# Patient Record
Sex: MALE | Race: BLACK OR AFRICAN AMERICAN | Employment: OTHER
[De-identification: names, ages, dates, MRNs, and addresses within clinical notes are randomized per-mention and may not be internally consistent; named-entity substitution may affect disease eponyms.]

---

## 2017-03-08 ENCOUNTER — HOSPITAL ENCOUNTER (OUTPATIENT)
Dept: RADIATION ONCOLOGY | Facility: MEDICAL CENTER | Age: 72
Discharge: HOME | End: 2017-03-08
Payer: MEDICARE

## 2017-07-11 ENCOUNTER — HOSPITAL ENCOUNTER (OUTPATIENT)
Dept: VASCULAR LAB | Age: 72
Discharge: HOME | End: 2017-07-11
Payer: MEDICARE

## 2017-07-11 PROCEDURE — 93880 EXTRACRANIAL BILAT STUDY: CPT

## 2017-07-28 ENCOUNTER — HOSPITAL ENCOUNTER (OUTPATIENT)
Dept: ULTRASOUND IMAGING | Age: 72
Discharge: HOME | End: 2017-07-28
Payer: MEDICARE

## 2017-07-28 DIAGNOSIS — N18.9 ACUTE-ON-CHRONIC KIDNEY INJURY (HCC): ICD-10-CM

## 2017-07-28 DIAGNOSIS — N17.9 ACUTE-ON-CHRONIC KIDNEY INJURY (HCC): ICD-10-CM

## 2017-07-28 PROCEDURE — 76775 US EXAM ABDO BACK WALL LIM: CPT

## 2018-02-07 ENCOUNTER — ANESTHESIA EVENT (OUTPATIENT)
Dept: OPERATING ROOM | Age: 73
End: 2018-02-07
Payer: MEDICARE

## 2018-02-08 ENCOUNTER — HOSPITAL ENCOUNTER (OUTPATIENT)
Age: 73
Setting detail: OUTPATIENT SURGERY
Discharge: HOME | End: 2018-02-08
Attending: SURGERY | Admitting: SURGERY
Payer: MEDICARE

## 2018-02-08 ENCOUNTER — ANESTHESIA (OUTPATIENT)
Dept: OPERATING ROOM | Age: 73
End: 2018-02-08
Payer: MEDICARE

## 2018-02-08 VITALS
TEMPERATURE: 96.8 F | HEIGHT: 71 IN | DIASTOLIC BLOOD PRESSURE: 66 MMHG | RESPIRATION RATE: 15 BRPM | HEART RATE: 71 BPM | BODY MASS INDEX: 27.9 KG/M2 | WEIGHT: 199.3 LBS | SYSTOLIC BLOOD PRESSURE: 110 MMHG | OXYGEN SATURATION: 96 %

## 2018-02-08 VITALS — TEMPERATURE: 96.8 F | OXYGEN SATURATION: 99 % | DIASTOLIC BLOOD PRESSURE: 51 MMHG | SYSTOLIC BLOOD PRESSURE: 80 MMHG

## 2018-02-08 LAB
INR BLD: 1.7
PARTIAL THROMBOPLASTIN TIME: 30.3 SEC (ref 23–31)
PROTHROMBIN TIME: 17.6 SEC (ref 9.7–11.6)

## 2018-02-08 PROCEDURE — 2500000003 HC RX 250 WO HCPCS: Performed by: NURSE ANESTHETIST, CERTIFIED REGISTERED

## 2018-02-08 PROCEDURE — 2580000003 HC RX 258: Performed by: ANESTHESIOLOGY

## 2018-02-08 PROCEDURE — 3700000001 HC ADD 15 MINUTES (ANESTHESIA): Performed by: SURGERY

## 2018-02-08 PROCEDURE — 88304 TISSUE EXAM BY PATHOLOGIST: CPT

## 2018-02-08 PROCEDURE — 85730 THROMBOPLASTIN TIME PARTIAL: CPT

## 2018-02-08 PROCEDURE — 3609010400 HC COLONOSCOPY POLYPECTOMY HOT BIOPSY: Performed by: SURGERY

## 2018-02-08 PROCEDURE — 6360000002 HC RX W HCPCS: Performed by: NURSE ANESTHETIST, CERTIFIED REGISTERED

## 2018-02-08 PROCEDURE — 3700000000 HC ANESTHESIA ATTENDED CARE: Performed by: SURGERY

## 2018-02-08 PROCEDURE — 7100000000 HC PACU RECOVERY - FIRST 15 MIN: Performed by: SURGERY

## 2018-02-08 PROCEDURE — 88305 TISSUE EXAM BY PATHOLOGIST: CPT

## 2018-02-08 PROCEDURE — 36415 COLL VENOUS BLD VENIPUNCTURE: CPT

## 2018-02-08 PROCEDURE — 85610 PROTHROMBIN TIME: CPT

## 2018-02-08 PROCEDURE — 7100000011 HC PHASE II RECOVERY - ADDTL 15 MIN: Performed by: SURGERY

## 2018-02-08 PROCEDURE — 7100000010 HC PHASE II RECOVERY - FIRST 15 MIN: Performed by: SURGERY

## 2018-02-08 RX ORDER — ONDANSETRON 2 MG/ML
INJECTION INTRAMUSCULAR; INTRAVENOUS PRN
Status: DISCONTINUED | OUTPATIENT
Start: 2018-02-08 | End: 2018-02-08 | Stop reason: SDUPTHER

## 2018-02-08 RX ORDER — LIDOCAINE HYDROCHLORIDE 20 MG/ML
INJECTION, SOLUTION INFILTRATION; PERINEURAL PRN
Status: DISCONTINUED | OUTPATIENT
Start: 2018-02-08 | End: 2018-02-08 | Stop reason: SDUPTHER

## 2018-02-08 RX ORDER — PROPOFOL 10 MG/ML
INJECTION, EMULSION INTRAVENOUS PRN
Status: DISCONTINUED | OUTPATIENT
Start: 2018-02-08 | End: 2018-02-08 | Stop reason: SDUPTHER

## 2018-02-08 RX ORDER — FENTANYL CITRATE 50 UG/ML
INJECTION, SOLUTION INTRAMUSCULAR; INTRAVENOUS PRN
Status: DISCONTINUED | OUTPATIENT
Start: 2018-02-08 | End: 2018-02-08 | Stop reason: SDUPTHER

## 2018-02-08 RX ORDER — SODIUM CHLORIDE, SODIUM LACTATE, POTASSIUM CHLORIDE, CALCIUM CHLORIDE 600; 310; 30; 20 MG/100ML; MG/100ML; MG/100ML; MG/100ML
INJECTION, SOLUTION INTRAVENOUS CONTINUOUS
Status: DISCONTINUED | OUTPATIENT
Start: 2018-02-08 | End: 2018-02-08 | Stop reason: HOSPADM

## 2018-02-08 RX ORDER — SODIUM CHLORIDE 9 MG/ML
INJECTION, SOLUTION INTRAVENOUS CONTINUOUS
Status: DISCONTINUED | OUTPATIENT
Start: 2018-02-08 | End: 2018-02-08

## 2018-02-08 RX ORDER — SODIUM CHLORIDE 0.9 % (FLUSH) 0.9 %
10 SYRINGE (ML) INJECTION EVERY 12 HOURS SCHEDULED
Status: DISCONTINUED | OUTPATIENT
Start: 2018-02-08 | End: 2018-02-08 | Stop reason: HOSPADM

## 2018-02-08 RX ORDER — SODIUM CHLORIDE 0.9 % (FLUSH) 0.9 %
10 SYRINGE (ML) INJECTION PRN
Status: DISCONTINUED | OUTPATIENT
Start: 2018-02-08 | End: 2018-02-08 | Stop reason: HOSPADM

## 2018-02-08 RX ORDER — DEXAMETHASONE SODIUM PHOSPHATE 10 MG/ML
INJECTION INTRAMUSCULAR; INTRAVENOUS PRN
Status: DISCONTINUED | OUTPATIENT
Start: 2018-02-08 | End: 2018-02-08 | Stop reason: SDUPTHER

## 2018-02-08 RX ORDER — LIDOCAINE HYDROCHLORIDE 10 MG/ML
1 INJECTION, SOLUTION EPIDURAL; INFILTRATION; INTRACAUDAL; PERINEURAL
Status: DISCONTINUED | OUTPATIENT
Start: 2018-02-08 | End: 2018-02-08 | Stop reason: HOSPADM

## 2018-02-08 RX ADMIN — ONDANSETRON 4 MG: 2 INJECTION, SOLUTION INTRAMUSCULAR; INTRAVENOUS at 07:27

## 2018-02-08 RX ADMIN — LIDOCAINE HYDROCHLORIDE 80 MG: 20 INJECTION, SOLUTION INFILTRATION; PERINEURAL at 07:27

## 2018-02-08 RX ADMIN — DEXAMETHASONE SODIUM PHOSPHATE 10 MG: 10 INJECTION INTRAMUSCULAR; INTRAVENOUS at 07:27

## 2018-02-08 RX ADMIN — SODIUM CHLORIDE, POTASSIUM CHLORIDE, SODIUM LACTATE AND CALCIUM CHLORIDE: 600; 310; 30; 20 INJECTION, SOLUTION INTRAVENOUS at 07:06

## 2018-02-08 RX ADMIN — FENTANYL CITRATE 50 MCG: 50 INJECTION, SOLUTION INTRAMUSCULAR; INTRAVENOUS at 07:27

## 2018-02-08 RX ADMIN — PROPOFOL 200 MG: 10 INJECTION, EMULSION INTRAVENOUS at 07:27

## 2018-02-08 RX ADMIN — FENTANYL CITRATE 50 MCG: 50 INJECTION, SOLUTION INTRAMUSCULAR; INTRAVENOUS at 07:40

## 2018-02-08 ASSESSMENT — PULMONARY FUNCTION TESTS
PIF_VALUE: 5
PIF_VALUE: 2
PIF_VALUE: 13
PIF_VALUE: 6
PIF_VALUE: 16
PIF_VALUE: 10
PIF_VALUE: 12
PIF_VALUE: 1
PIF_VALUE: 21
PIF_VALUE: 12
PIF_VALUE: 0
PIF_VALUE: 11
PIF_VALUE: 10
PIF_VALUE: 0
PIF_VALUE: 17
PIF_VALUE: 12
PIF_VALUE: 1
PIF_VALUE: 13
PIF_VALUE: 1
PIF_VALUE: 16
PIF_VALUE: 16
PIF_VALUE: 0
PIF_VALUE: 0
PIF_VALUE: 15
PIF_VALUE: 10
PIF_VALUE: 13
PIF_VALUE: 21
PIF_VALUE: 0
PIF_VALUE: 21
PIF_VALUE: 0
PIF_VALUE: 1
PIF_VALUE: 16
PIF_VALUE: 19
PIF_VALUE: 3
PIF_VALUE: 10
PIF_VALUE: 0
PIF_VALUE: 1
PIF_VALUE: 6
PIF_VALUE: 16
PIF_VALUE: 13
PIF_VALUE: 7
PIF_VALUE: 13
PIF_VALUE: 1
PIF_VALUE: 12
PIF_VALUE: 2
PIF_VALUE: 18
PIF_VALUE: 16

## 2018-02-08 ASSESSMENT — PAIN SCALES - GENERAL
PAINLEVEL_OUTOF10: 0

## 2018-02-08 ASSESSMENT — PAIN - FUNCTIONAL ASSESSMENT: PAIN_FUNCTIONAL_ASSESSMENT: 0-10

## 2018-02-08 NOTE — H&P
GI History and Physical    Pt Name: Rohith Campuzano  MRN: [id number]  YOB: 1945  Date of evaluation: 2/8/2018  Primary Care Physician: Mika Lal MD    SUBJECTIVE:   History of Chief Complaint: This is Rohith Campuzano a 67 y.o. male who presents today for a Colonoscopy by Dr Sunny Irwin for hx colon polyps The patient completed the prep as directed until watery clear yellow. Bowel movements have not significantly changed  No family history of colon cancer or polyps. Previous colonoscopy 2014. Patient today denies bloody tarry stools, diarrhea alternating with constipation,  fever, chills, night sweats, abdominal pain or unexplained weight loss. Last Coumadin 2/3/18    Past Medical History    has a past medical history of Atrial fibrillation ([facility] [address] Cancer [facility]); GERD (gastroesophageal reflux disease); Glaucoma; Gout; Hx of blood clots; Hyperlipidemia; Hypertension; and Unspecified cerebral artery occlusion with cerebral infarction ([facility] [address] Past Surgical History   has a past surgical history that includes ostatectomy (2005); Colonoscopy; and polypectomy. Medications  Prior to Admission medications    Medication Sig Start Date End Date Taking? Authorizing Provider   hydrALAZINE (APRESOLINE) 25 MG tablet Take 25 mg by mouth 2 times daily. Yes Historical Provider, MD   metoprolol (LOPRESSOR) 50 MG tablet Take 50 mg by mouth 2 times daily. Yes Historical Provider, MD   diltiazem (DILACOR XR) 240 MG XR capsule Take 240 mg by mouth daily. Yes Historical Provider, MD   valsartan-hydrochlorothiazide (DIOVAN HCT) 320-25 MG per tablet Take 1 tablet by mouth daily. Yes Historical Provider, MD   esomeprazole Magnesium (NEXIUM) 40 MG PACK Take 40 mg by mouth daily. Yes Historical Provider, MD   atorvastatin (LIPITOR) 20 MG tablet Take 20 mg by mouth daily. Yes Historical Provider, MD   bimatoprost (LUMIGAN) 0.01 % SOLN ophthalmic drops Place 1 drop into both eyes nightly.    Yes Historical Provider, MD

## 2018-02-08 NOTE — OP NOTE
(minutes): 10      Next screening colonoscopy: 3 years. If screening is less than 10 years the recommended reason is due:polyps    The colon was decompressed. While withdrawing the scope the above findings were verified and the scope was removed. The patient tolerated the procedure and conscious sedation without unusual events. In the recovery room patient was examined and remains hemodynamically stable. Discharge home when criteria met. Recommendations/Plan:   1. F/U Biopsies  2. F/U In Office as instructed  3. Discussed with the family  4. High fiber diet   5. Precautions to avoid constipation  6. Outpatient follow-up in the office to discuss pathology results. If the pathology is benign follow-up colonoscopy in 3 years. High fiber diet. Annual guaiac checks.     Electronically signed by Marimar Michelle MD  on 2/8/2018 at 7:52 AM

## 2018-02-08 NOTE — ANESTHESIA PRE PROCEDURE
Department of Anesthesiology  Preprocedure Note       Name:  Colby Swain   Age:  67 y.o.  :  1945                                          MRN:  [id number]         Date:  2018      Surgeon: Tapan Renteria):  Chucho Crow MD    Procedure: Procedure(s):  COLONOSCOPY  PT IN [facility]    Medications prior to admission:   Prior to Admission medications    Medication Sig Start Date End Date Taking? Authorizing Provider   hydrALAZINE (APRESOLINE) 25 MG tablet Take 25 mg by mouth 2 times daily. Yes Historical Provider, MD   metoprolol (LOPRESSOR) 50 MG tablet Take 50 mg by mouth 2 times daily. Yes Historical Provider, MD   diltiazem (DILACOR XR) 240 MG XR capsule Take 240 mg by mouth daily. Yes Historical Provider, MD   valsartan-hydrochlorothiazide (DIOVAN HCT) 320-25 MG per tablet Take 1 tablet by mouth daily. Yes Historical Provider, MD   esomeprazole Magnesium (NEXIUM) 40 MG PACK Take 40 mg by mouth daily. Yes Historical Provider, MD   atorvastatin (LIPITOR) 20 MG tablet Take 20 mg by mouth daily. Yes Historical Provider, MD   bimatoprost (LUMIGAN) 0.01 % SOLN ophthalmic drops Place 1 drop into both eyes nightly. Yes Historical Provider, MD   colchicine 0.6 MG tablet Take 0.6 mg by mouth daily. Yes Historical Provider, MD   allopurinol (ZYLOPRIM) 100 MG tablet Take 100 mg by mouth 2 times daily. Yes Historical Provider, MD   folic acid (FOLVITE) 1 MG tablet Take 1 mg by mouth every other day. Yes Historical Provider, MD   warfarin (COUMADIN) 3 MG tablet Take 3 mg by mouth daily. Historical Provider, MD   Ipratropium-Albuterol (COMBIVENT IN) Inhale 2 puffs into the lungs daily as needed. Historical Provider, MD   aspirin 81 MG tablet Take 81 mg by mouth daily.     Historical Provider, MD       Current medications:    Current Facility-Administered Medications   Medication Dose Route Frequency Provider Last Rate Last Dose    lactated ringers infusion   Intravenous Continuous Stephan HARDY Karley Haines MD        sodium chloride flush 0.9 % injection 10 mL  10 mL Intravenous 2 times per day Yola Lindquist MD        sodium chloride flush 0.9 % injection 10 mL  10 mL Intravenous PRN Yola Lindquist MD        lidocaine PF 1 % injection 1 mL  1 mL Intradermal Once PRN Yola Lindquist MD           Allergies:  No Known Allergies    Problem List:    Patient Active Problem List   Diagnosis Code    Colon polyp K63.5       Past Medical History:        Diagnosis Date    Atrial fibrillation ([facility] [address] 2005    Cancer [facility]) 2004    prostate    GERD (gastroesophageal reflux disease)     Glaucoma     Gout     Hx of blood clots     leg - patient unsure which leg    Hyperlipidemia     Hypertension     Unspecified cerebral artery occlusion with cerebral infarction 2005    no deficits       Past Surgical History:        Procedure Laterality Date    COLONOSCOPY      POLYPECTOMY      benign with colonoscopy    PROSTATECTOMY  2005       Social History:    Social History   Substance Use Topics    Smoking status: Former Smoker    Smokeless tobacco: Never Used    Alcohol use No      Comment: quit 20 years ago                                Counseling given: Not Answered      Vital Signs (Current):   Vitals:    02/08/18 0600 02/08/18 0615   BP: 127/65    Pulse: 82    Resp: 18    Temp: 97.7 °F (36.5 °C)    TempSrc: Oral    SpO2: 98%    Weight:  199 lb 4.7 oz (90.4 kg)   Height:  5' 11\" (1.803 m)                                              BP Readings from Last 3 Encounters:   02/08/18 127/65   05/27/14 112/70   05/15/14 126/80       NPO Status: Time of last liquid consumption: 2330                        Time of last solid consumption: 1700                        Date of last liquid consumption: 02/07/18                        Date of last solid food consumption: 02/06/18    BMI:   Wt Readings from Last 3 Encounters:   02/08/18 199 lb 4.7 oz (90.4 kg)   05/27/14 214 lb 11.7 oz (97.4 kg)   05/15/14 214 lb 11.7 oz CRNA.    Attending anesthesiologist reviewed and agrees with Pre Eval content              Patrice Higgins DO   2/8/2018

## 2018-02-09 LAB — SURGICAL PATHOLOGY REPORT: NORMAL

## 2018-02-13 ENCOUNTER — HOSPITAL ENCOUNTER (EMERGENCY)
Age: 73
Discharge: HOME | End: 2018-02-13
Attending: EMERGENCY MEDICINE
Payer: MEDICARE

## 2018-02-13 ENCOUNTER — APPOINTMENT (OUTPATIENT)
Dept: CT IMAGING | Age: 73
End: 2018-02-13
Payer: MEDICARE

## 2018-02-13 VITALS
WEIGHT: 198.2 LBS | BODY MASS INDEX: 27.75 KG/M2 | OXYGEN SATURATION: 100 % | HEIGHT: 71 IN | TEMPERATURE: 97.6 F | HEART RATE: 77 BPM | DIASTOLIC BLOOD PRESSURE: 80 MMHG | RESPIRATION RATE: 18 BRPM | SYSTOLIC BLOOD PRESSURE: 118 MMHG

## 2018-02-13 DIAGNOSIS — R31.9 URINARY TRACT INFECTION WITH HEMATURIA, SITE UNSPECIFIED: ICD-10-CM

## 2018-02-13 DIAGNOSIS — R31.9 HEMATURIA, UNSPECIFIED TYPE: Primary | ICD-10-CM

## 2018-02-13 DIAGNOSIS — N39.0 URINARY TRACT INFECTION WITH HEMATURIA, SITE UNSPECIFIED: ICD-10-CM

## 2018-02-13 DIAGNOSIS — R93.5 ABNORMAL CT OF THE ABDOMEN: ICD-10-CM

## 2018-02-13 LAB
-: NORMAL
AMORPHOUS: NORMAL
BACTERIA: NORMAL
BILIRUBIN URINE: NEGATIVE
CASTS UA: NORMAL /LPF
COLOR: YELLOW
COMMENT UA: ABNORMAL
CRYSTALS, UA: NORMAL /HPF
EPITHELIAL CELLS UA: NORMAL /HPF (ref 0–5)
GLUCOSE URINE: NEGATIVE
KETONES, URINE: NEGATIVE
LEUKOCYTE ESTERASE, URINE: ABNORMAL
MUCUS: NORMAL
NITRITE, URINE: NEGATIVE
OTHER OBSERVATIONS UA: NORMAL
PH UA: 7 (ref 5–8)
PROTEIN UA: NEGATIVE
RBC UA: NORMAL /HPF (ref 0–2)
RENAL EPITHELIAL, UA: NORMAL /HPF
SPECIFIC GRAVITY UA: 1.01 (ref 1–1.03)
TRICHOMONAS: NORMAL
TURBIDITY: ABNORMAL
URINE HGB: ABNORMAL
UROBILINOGEN, URINE: NORMAL
WBC UA: NORMAL /HPF (ref 0–5)
YEAST: NORMAL

## 2018-02-13 PROCEDURE — 81001 URINALYSIS AUTO W/SCOPE: CPT

## 2018-02-13 PROCEDURE — 51798 US URINE CAPACITY MEASURE: CPT

## 2018-02-13 PROCEDURE — 74176 CT ABD & PELVIS W/O CONTRAST: CPT

## 2018-02-13 PROCEDURE — 87086 URINE CULTURE/COLONY COUNT: CPT

## 2018-02-13 PROCEDURE — 87088 URINE BACTERIA CULTURE: CPT

## 2018-02-13 PROCEDURE — 87186 SC STD MICRODIL/AGAR DIL: CPT

## 2018-02-13 PROCEDURE — 99284 EMERGENCY DEPT VISIT MOD MDM: CPT

## 2018-02-13 RX ORDER — CEPHALEXIN 500 MG/1
500 CAPSULE ORAL 3 TIMES DAILY
Qty: 30 CAPSULE | Refills: 0 | Status: SHIPPED | OUTPATIENT
Start: 2018-02-13 | End: 2018-02-23

## 2018-02-13 ASSESSMENT — ENCOUNTER SYMPTOMS
CONSTIPATION: 0
ABDOMINAL PAIN: 1
VOMITING: 0
DIARRHEA: 0
NAUSEA: 0

## 2018-02-13 NOTE — ED PROVIDER NOTES
[address] [place] [address] ED  eMERGENCY dEPARTMENT eNCOUnter      Pt Name: James Harrington  MRN: [id number]  [place] 1945  Date of evaluation: 2/13/2018  Provider: Enriqueta Monterroso NP    CHIEF COMPLAINT       Chief Complaint   Patient presents with    Other     urinary retention, seen PCP yesterday,pt states urine test positive for blood         HISTORY OF PRESENT ILLNESS  (Location/Symptom, Timing/Onset, Context/Setting, Quality, Duration, Modifying Factors, Severity.)   James Harrington is a 67 y.o. male who presents to the emergency department Via private auto with urinary retention that started last evening. He is voiding more frequently than normal and feels as if he is not fully emptying his bladder. No dysuria or hilary hematuria. No abdominal pain, back pain, fevers or vomiting. He was seen by his primary care provider yesterday as a checkup for his enlarged prostate. He was told that he had blood in the urine at that time. Nursing Notes were reviewed. ALLERGIES     Review of patient's allergies indicates no known allergies. CURRENT MEDICATIONS       Discharge Medication List as of 2/13/2018  3:18 PM      CONTINUE these medications which have NOT CHANGED    Details   hydrALAZINE (APRESOLINE) 25 MG tablet Take 25 mg by mouth 2 times daily. Historical Med      metoprolol (LOPRESSOR) 50 MG tablet Take 50 mg by mouth 2 times daily. Historical Med      diltiazem (DILACOR XR) 240 MG XR capsule Take 240 mg by mouth daily. Historical Med      warfarin (COUMADIN) 3 MG tablet Take 3 mg by mouth daily. Historical Med      valsartan-hydrochlorothiazide (DIOVAN HCT) 320-25 MG per tablet Take 1 tablet by mouth daily. Historical Med      atorvastatin (LIPITOR) 20 MG tablet Take 20 mg by mouth daily. Historical Med      bimatoprost (LUMIGAN) 0.01 % SOLN ophthalmic drops Place 1 drop into both eyes nightly. Historical Med      aspirin 81 MG tablet Take 81 mg by mouth daily. Historical Med      allopurinol (ZYLOPRIM) 100 MG tablet Take 100 mg by mouth 2 times daily. Historical Med      folic acid (FOLVITE) 1 MG tablet Take 1 mg by mouth every other day. Historical Med      esomeprazole Magnesium (NEXIUM) 40 MG PACK Take 40 mg by mouth daily. Historical Med      Ipratropium-Albuterol (COMBIVENT IN) Inhale 2 puffs into the lungs daily as needed. Historical Med      colchicine 0.6 MG tablet Take 0.6 mg by mouth daily. Historical Med             PAST MEDICAL HISTORY         Diagnosis Date    Atrial fibrillation ([address] 2005    Cancer [facility]) 2004    prostate    GERD (gastroesophageal reflux disease)     Glaucoma     Gout     Hx of blood clots     leg - patient unsure which leg    Hyperlipidemia     Hypertension     Unspecified cerebral artery occlusion with cerebral infarction     no deficits       SURGICAL HISTORY           Procedure Laterality Date    COLONOSCOPY      COLONOSCOPY  2018    COLONOSCOPY N/A 2018    COLONOSCOPY (PT IN [facility])  WITH COLD BX performed by Marimar Michelle MD at [address] [place] [address] POLYPECTOMY      benign with colonoscopy    PROSTATECTOMY           FAMILY HISTORY           Problem Relation Age of Onset    Stroke Mother     Kidney Disease Father     Diabetes Father     Heart Attack Sister 40    Diabetes Brother      Family Status   Relation Status    Mother     Father    [facility] Sister    [facility] Brother     Sister Alive    Brother Alive    Sister     Brother         SOCIAL HISTORY      reports that he has quit smoking. He has never used smokeless tobacco. He reports that he does not drink alcohol or use drugs. REVIEW OF SYSTEMS    (2-9 systems for level 4, 10 or more for level 5)     Review of Systems   Constitutional: Negative for activity change and fever. Gastrointestinal: Positive for abdominal pain. Negative for constipation, diarrhea, nausea and vomiting. Genitourinary: Positive for difficulty urinating and frequency.  Negative for decreased urine volume, left kidney in  the mid to lower pole, exophytic possibly hyperdense cysts with solid mass not excluded.  He was advised of these findings and of the importance of following up with a urologist and his primary care provider. He was also discharged home with a prescription for Cipro. He will return to the emergency room for any new or worsening symptoms. FINAL IMPRESSION      1. Hematuria, unspecified type    2. Urinary tract infection with hematuria, site unspecified    3. Abnormal CT of the abdomen          DISPOSITION/PLAN   DISPOSITION Decision To Discharge 02/13/2018 03:13:36 PM      PATIENT REFERRED TO:   Tata Sheppard MD  [address] [place] [address]  [address]  [address] [facility]  [phone]    Call in 1 day      Charles Link MD  [address]  [place] [address]  [phone]    Call in 1 day  blood in urine. abnormal CT scan      DISCHARGE MEDICATIONS:     Discharge Medication List as of 2/13/2018  3:18 PM      START taking these medications    Details   cephALEXin (KEFLEX) 500 MG capsule Take 1 capsule by mouth 3 times daily for 10 days, Disp-30 capsule, R-0Print           (Please note that portions of this note were completed with a voice recognition program.  Efforts were made to edit the dictations but occasionally words are mis-transcribed. )    MARY OLIVEIRA NP  02/13/18 3913

## 2018-02-14 LAB
CULTURE: ABNORMAL
CULTURE: ABNORMAL
Lab: ABNORMAL
ORGANISM: ABNORMAL
SPECIMEN DESCRIPTION: ABNORMAL
SPECIMEN DESCRIPTION: ABNORMAL
STATUS: ABNORMAL

## 2018-02-21 ENCOUNTER — HOSPITAL ENCOUNTER (OUTPATIENT)
Age: 73
Discharge: HOME | End: 2018-02-21
Payer: MEDICARE

## 2018-02-21 PROCEDURE — 87086 URINE CULTURE/COLONY COUNT: CPT

## 2018-02-22 LAB
CULTURE: NO GROWTH
CULTURE: NORMAL
Lab: NORMAL
SPECIMEN DESCRIPTION: NORMAL
SPECIMEN DESCRIPTION: NORMAL
STATUS: NORMAL

## 2019-05-23 ENCOUNTER — HOSPITAL ENCOUNTER (OUTPATIENT)
Dept: ULTRASOUND IMAGING | Age: 74
Discharge: HOME | End: 2019-05-25
Payer: MEDICARE

## 2019-05-23 DIAGNOSIS — N18.30 CHRONIC KIDNEY DISEASE, STAGE III (MODERATE) (HCC): ICD-10-CM

## 2019-05-23 PROCEDURE — 76775 US EXAM ABDO BACK WALL LIM: CPT

## 2019-08-05 ENCOUNTER — HOSPITAL ENCOUNTER (OUTPATIENT)
Dept: PHARMACY | Age: 74
Setting detail: THERAPIES SERIES
Discharge: HOME | End: 2019-08-05
Payer: MEDICARE

## 2019-08-05 DIAGNOSIS — I48.91 ATRIAL FIBRILLATION, UNSPECIFIED TYPE (HCC): ICD-10-CM

## 2019-08-05 LAB
INR BLD: 1.8
PROTHROMBIN TIME: 21.7

## 2019-08-05 PROCEDURE — 85610 PROTHROMBIN TIME: CPT

## 2019-08-05 PROCEDURE — 99213 OFFICE O/P EST LOW 20 MIN: CPT

## 2019-08-05 RX ORDER — ACETAMINOPHEN 500 MG
500 TABLET ORAL EVERY 6 HOURS PRN
COMMUNITY

## 2019-08-05 RX ORDER — RANITIDINE 150 MG/1
150 TABLET ORAL PRN
COMMUNITY
End: 2020-06-15 | Stop reason: ALTCHOICE

## 2019-08-05 RX ORDER — BRINZOLAMIDE/BRIMONIDINE TARTRATE 10; 2 MG/ML; MG/ML
SUSPENSION/ DROPS OPHTHALMIC
Refills: 11 | COMMUNITY
Start: 2019-07-21 | End: 2021-01-18 | Stop reason: SDUPTHER

## 2019-08-05 RX ORDER — HYDROCHLOROTHIAZIDE 25 MG/1
25 TABLET ORAL DAILY
COMMUNITY
Start: 2019-06-25 | End: 2021-03-08

## 2019-08-19 ENCOUNTER — HOSPITAL ENCOUNTER (OUTPATIENT)
Dept: PHARMACY | Age: 74
Setting detail: THERAPIES SERIES
Discharge: HOME | End: 2019-08-19
Payer: MEDICARE

## 2019-08-19 DIAGNOSIS — I48.91 ATRIAL FIBRILLATION, UNSPECIFIED TYPE (HCC): ICD-10-CM

## 2019-08-19 LAB
INR BLD: 2.1
PROTHROMBIN TIME: 25.5

## 2019-08-19 PROCEDURE — 85610 PROTHROMBIN TIME: CPT

## 2019-08-19 PROCEDURE — 99211 OFF/OP EST MAY X REQ PHY/QHP: CPT

## 2019-09-04 ENCOUNTER — HOSPITAL ENCOUNTER (OUTPATIENT)
Dept: PHARMACY | Age: 74
Setting detail: THERAPIES SERIES
Discharge: HOME | End: 2019-09-04
Payer: MEDICARE

## 2019-09-04 DIAGNOSIS — I48.91 ATRIAL FIBRILLATION, UNSPECIFIED TYPE (HCC): ICD-10-CM

## 2019-09-04 LAB
INR BLD: 3.1
PROTHROMBIN TIME: 37.1

## 2019-09-04 PROCEDURE — 99211 OFF/OP EST MAY X REQ PHY/QHP: CPT

## 2019-09-04 PROCEDURE — 85610 PROTHROMBIN TIME: CPT

## 2019-09-23 ENCOUNTER — HOSPITAL ENCOUNTER (OUTPATIENT)
Dept: PHARMACY | Age: 74
Setting detail: THERAPIES SERIES
Discharge: HOME | End: 2019-09-23
Payer: MEDICARE

## 2019-09-23 DIAGNOSIS — I48.91 ATRIAL FIBRILLATION, UNSPECIFIED TYPE (HCC): ICD-10-CM

## 2019-09-23 LAB
INR BLD: 2.6
PROTHROMBIN TIME: 30.7

## 2019-09-23 PROCEDURE — 99211 OFF/OP EST MAY X REQ PHY/QHP: CPT

## 2019-09-23 PROCEDURE — 85610 PROTHROMBIN TIME: CPT

## 2019-10-21 ENCOUNTER — HOSPITAL ENCOUNTER (OUTPATIENT)
Dept: PHARMACY | Age: 74
Setting detail: THERAPIES SERIES
Discharge: HOME | End: 2019-10-21
Payer: MEDICARE

## 2019-10-21 DIAGNOSIS — I48.91 ATRIAL FIBRILLATION, UNSPECIFIED TYPE (HCC): ICD-10-CM

## 2019-10-21 LAB
INR BLD: 2.6
PROTHROMBIN TIME: 31.6

## 2019-10-21 PROCEDURE — 85610 PROTHROMBIN TIME: CPT

## 2019-10-21 PROCEDURE — 99211 OFF/OP EST MAY X REQ PHY/QHP: CPT

## 2019-11-18 ENCOUNTER — HOSPITAL ENCOUNTER (OUTPATIENT)
Dept: PHARMACY | Age: 74
Setting detail: THERAPIES SERIES
Discharge: HOME | End: 2019-11-18
Payer: MEDICARE

## 2019-11-18 DIAGNOSIS — I48.91 ATRIAL FIBRILLATION, UNSPECIFIED TYPE (HCC): ICD-10-CM

## 2019-11-18 LAB
INR BLD: 3
PROTIME: 36.2 SECONDS

## 2019-11-18 PROCEDURE — 85610 PROTHROMBIN TIME: CPT

## 2019-11-18 PROCEDURE — 99211 OFF/OP EST MAY X REQ PHY/QHP: CPT

## 2019-12-16 ENCOUNTER — HOSPITAL ENCOUNTER (OUTPATIENT)
Dept: PHARMACY | Age: 74
Setting detail: THERAPIES SERIES
Discharge: HOME | End: 2019-12-16
Payer: MEDICARE

## 2019-12-16 DIAGNOSIS — I48.91 ATRIAL FIBRILLATION, UNSPECIFIED TYPE (HCC): ICD-10-CM

## 2019-12-16 LAB
INR BLD: 2.3
PROTIME: 27.6 SECONDS

## 2019-12-16 PROCEDURE — 99211 OFF/OP EST MAY X REQ PHY/QHP: CPT

## 2019-12-16 PROCEDURE — 85610 PROTHROMBIN TIME: CPT

## 2020-01-13 ENCOUNTER — HOSPITAL ENCOUNTER (OUTPATIENT)
Dept: PHARMACY | Age: 75
Setting detail: THERAPIES SERIES
Discharge: HOME | End: 2020-01-13
Payer: MEDICARE

## 2020-01-13 LAB
INR BLD: 2.4
PROTIME: 28.9 SECONDS

## 2020-01-13 PROCEDURE — 99211 OFF/OP EST MAY X REQ PHY/QHP: CPT

## 2020-01-13 PROCEDURE — 85610 PROTHROMBIN TIME: CPT

## 2020-01-13 NOTE — PROGRESS NOTES
Patient states compliant all of the time with regimen. No bleeding or thromboembolic side effects noted. No significant med or dietary changes. No significant recent illness or disease state changes. PT/INR done in office per protocol. INR is 2.4 which is therapeutic. Warfarin regimen will be continued at current dose of 1.5mg Sun/Wed and 3mg AOD. Will retest in 4 weeks. Patient understands dosing directions and information discussed. Dosing schedule and follow up appointment given to patient. Progress note routed to referring physicians office. Patient acknowledges working in consult agreement with pharmacist as referred by his/her physician.       Carla Armijo, 1/13/2020 1:03 PM

## 2020-02-10 ENCOUNTER — HOSPITAL ENCOUNTER (OUTPATIENT)
Dept: PHARMACY | Age: 75
Setting detail: THERAPIES SERIES
Discharge: HOME | End: 2020-02-10
Payer: MEDICARE

## 2020-02-10 LAB
INR BLD: 2.6
PROTIME: 31.5 SECONDS

## 2020-02-10 PROCEDURE — 85610 PROTHROMBIN TIME: CPT

## 2020-02-10 PROCEDURE — 99211 OFF/OP EST MAY X REQ PHY/QHP: CPT

## 2020-02-10 NOTE — PROGRESS NOTES
Patient states compliant all of the time with regimen. No bleeding or thromboembolic side effects noted. No significant med or dietary changes. No significant recent illness or disease state changes. PT/INR done in office per protocol. INR is 2.6 which is therapeutic. Warfarin regimen will be continued at current dose 1.5 mg Sun/Wed and 3 mg AOD. Will retest in 6 weeks. Patient understands dosing directions and information discussed. Dosing schedule and follow up appointment given to patient. Progress note routed to referring physicians office. Patient acknowledges working in consult agreement with pharmacist as referred by his/her physician.       Martha Banegas, 2/10/2020 1:22 PM

## 2020-03-17 ENCOUNTER — TELEPHONE (OUTPATIENT)
Dept: PHARMACY | Age: 75
End: 2020-03-17

## 2020-05-04 ENCOUNTER — HOSPITAL ENCOUNTER (OUTPATIENT)
Dept: PHARMACY | Age: 75
Setting detail: THERAPIES SERIES
Discharge: HOME | End: 2020-05-04
Payer: MEDICARE

## 2020-05-04 LAB
INR BLD: 2.9
PROTIME: 34.9 SECONDS

## 2020-05-04 PROCEDURE — 85610 PROTHROMBIN TIME: CPT

## 2020-05-04 PROCEDURE — 99211 OFF/OP EST MAY X REQ PHY/QHP: CPT

## 2020-05-04 NOTE — PROGRESS NOTES
Patient states compliant all of the time with regimen. No bleeding or thromboembolic side effects noted. No significant med or dietary changes. No significant recent illness or disease state changes. PT/INR done in office per protocol. INR is 2.9 which is therapeutic. Warfarin regimen will be continued at current dose of 1.5mg Sun/Wed and 3mg AOD. Will retest in 6 weeks. Patient understands dosing directions and information discussed. Dosing schedule and follow up appointment given to patient. Progress note routed to referring physicians office. Patient acknowledges working in consult agreement with pharmacist as referred by his/her physician.       Raine Pierre, 5/4/2020 2:04 PM    CLINICAL PHARMACY CONSULT: MED RECONCILIATION/REVIEW ADDENDUM    For Pharmacy Admin Tracking Only    PHSO: No  Total # of Interventions Recommended: 0  - Maintenance Safety Lab Monitoring #: 1  Total Interventions Accepted: 0  Time Spent (min): [address] [facility] [address], [address] [place] [address]

## 2020-06-15 ENCOUNTER — HOSPITAL ENCOUNTER (OUTPATIENT)
Dept: PHARMACY | Age: 75
Setting detail: THERAPIES SERIES
Discharge: HOME | End: 2020-06-15
Payer: MEDICARE

## 2020-06-15 VITALS — TEMPERATURE: 97.1 F

## 2020-06-15 LAB
INR BLD: 2.4
PROTIME: 29.3 SECONDS

## 2020-06-15 PROCEDURE — 99211 OFF/OP EST MAY X REQ PHY/QHP: CPT

## 2020-06-15 PROCEDURE — 85610 PROTHROMBIN TIME: CPT

## 2020-07-27 ENCOUNTER — HOSPITAL ENCOUNTER (OUTPATIENT)
Dept: PHARMACY | Age: 75
Setting detail: THERAPIES SERIES
Discharge: HOME | End: 2020-07-27
Payer: MEDICARE

## 2020-07-27 VITALS — TEMPERATURE: 96.6 F

## 2020-07-27 LAB
INR BLD: 2.7
PROTIME: 32.1 SECONDS

## 2020-07-27 PROCEDURE — 85610 PROTHROMBIN TIME: CPT

## 2020-07-27 PROCEDURE — 99211 OFF/OP EST MAY X REQ PHY/QHP: CPT

## 2020-07-27 NOTE — PROGRESS NOTES
Patient states compliant all of the time with regimen. No bleeding or thromboembolic side effects noted. No significant med or dietary changes. No significant recent illness or disease state changes. PT/INR done in office per protocol. INR is 2.7 which is therapeutic. Warfarin regimen will be continued at current dose 1.5mg Sun/Wed, 3mg all other days. Will retest in 6 weeks. Patient understands dosing directions and information discussed. Dosing schedule and follow up appointment given to patient. Progress note routed to referring physicians office. Discussed with patient the Pharmacist Collaborative Practice Agreement. Patient provided verbal and/or electronic (ex. [employer]) consent to participate in the collaborative practice agreement between the pharmacist and referred patient. This is in lieu of paper consent due to COVID-19 precautions and the use of remote/virtual visits.        CLINICAL PHARMACY CONSULT: MED RECONCILIATION/REVIEW ADDENDUM    For Pharmacy Admin Tracking Only    PHSO: No  Total # of Interventions Recommended: 0  - Maintenance Safety Lab Monitoring #: 1  Total Interventions Accepted: 0  Time Spent (min): Renee Goodson PharmD
normal...

## 2020-09-14 ENCOUNTER — HOSPITAL ENCOUNTER (OUTPATIENT)
Dept: PHARMACY | Age: 75
Setting detail: THERAPIES SERIES
Discharge: HOME | End: 2020-09-14
Payer: MEDICARE

## 2020-09-14 VITALS — TEMPERATURE: 96.6 F

## 2020-09-14 LAB
INR BLD: 2.2
PROTIME: 26.7 SECONDS

## 2020-09-14 PROCEDURE — 85610 PROTHROMBIN TIME: CPT

## 2020-09-14 PROCEDURE — 99211 OFF/OP EST MAY X REQ PHY/QHP: CPT

## 2020-10-26 ENCOUNTER — HOSPITAL ENCOUNTER (OUTPATIENT)
Dept: PHARMACY | Age: 75
Setting detail: THERAPIES SERIES
Discharge: HOME | End: 2020-10-26
Payer: MEDICARE

## 2020-10-26 VITALS — TEMPERATURE: 97.5 F

## 2020-10-26 LAB
INR BLD: 3.2
PROTIME: 38.2 SECONDS

## 2020-10-26 PROCEDURE — 85610 PROTHROMBIN TIME: CPT

## 2020-10-26 PROCEDURE — 99211 OFF/OP EST MAY X REQ PHY/QHP: CPT

## 2020-10-26 NOTE — PROGRESS NOTES
Patient seen in clinic for warfarin management due to atrial fibrillation with an INR goal of 2.0-3.0. Estimated duration of therapy is indefinite. Patient states compliant all of the time with regimen. No bleeding or thromboembolic side effects noted. No significant med or dietary changes. No significant recent illness or disease state changes. PT/INR done in office per protocol. INR is 3.2 which is just above goal.     Warfarin regimen will be continued at current dose of 1.5 mg Sun, Wed and 3 mg all other days. Will retest in 6 weeks. Patient understands dosing directions and information discussed. Dosing schedule and follow up appointment given to patient. Progress note routed to referring physicians office. Discussed with patient the Pharmacist Collaborative Practice Agreement. Patient provided verbal and/or electronic (ex. [employer]) consent to participate in the collaborative practice agreement between the pharmacist and referred patient. This is in lieu of paper consent due to COVID-19 precautions and the use of remote/virtual visits.     CLINICAL PHARMACY CONSULT: MED RECONCILIATION/REVIEW ADDENDUM    For Pharmacy Admin Tracking Only    PHSO: No  Total # of Interventions Recommended: 0  - Maintenance Safety Lab Monitoring #: 1  Total Interventions Accepted: 0  Time Spent (min): [address] PharmD

## 2020-12-07 ENCOUNTER — HOSPITAL ENCOUNTER (OUTPATIENT)
Dept: PHARMACY | Age: 75
Setting detail: THERAPIES SERIES
Discharge: HOME | End: 2020-12-07
Payer: MEDICARE

## 2020-12-07 VITALS — TEMPERATURE: 97.1 F

## 2020-12-07 LAB
INR BLD: 2.5
PROTIME: 29.5 SECONDS

## 2020-12-07 PROCEDURE — 99211 OFF/OP EST MAY X REQ PHY/QHP: CPT

## 2020-12-07 PROCEDURE — 85610 PROTHROMBIN TIME: CPT

## 2020-12-07 NOTE — PROGRESS NOTES
Patient seen in clinic for warfarin management due to atrial fibrillation with an INR goal of 2.0-3.0. Estimated duration of therapy is indefinite. Patient states compliant all of the time with regimen. No bleeding or thromboembolic side effects noted. No significant med or dietary changes. No significant recent illness or disease state changes. PT/INR done in office per protocol. INR is 2.5 which is therapeutic. Warfarin regimen will be continued at current dose of 1.5mg Sun/Wed, and 3mg all other days. Will retest in 6 weeks. Patient understands dosing directions and information discussed. Dosing schedule and follow up appointment given to patient. Progress note routed to referring physicians office. Discussed with patient the Pharmacist Collaborative Practice Agreement. Patient provided verbal and/or electronic (ex. [employer]) consent to participate in the collaborative practice agreement between the pharmacist and referred patient. This is in lieu of paper consent due to COVID-19 precautions and the use of remote/virtual visits.        CLINICAL PHARMACY CONSULT: MED RECONCILIATION/REVIEW ADDENDUM    For Pharmacy Admin Tracking Only    PHSO: No  Total # of Interventions Recommended: 0  - Maintenance Safety Lab Monitoring #: 1  Total Interventions Accepted: 0  Time Spent (min): [address] PharmD

## 2021-01-12 ENCOUNTER — HOSPITAL ENCOUNTER (EMERGENCY)
Facility: CLINIC | Age: 76
Discharge: HOME | End: 2021-01-12
Attending: EMERGENCY MEDICINE
Payer: MEDICARE

## 2021-01-12 VITALS
DIASTOLIC BLOOD PRESSURE: 57 MMHG | HEIGHT: 71 IN | WEIGHT: 199 LBS | TEMPERATURE: 97.6 F | HEART RATE: 81 BPM | RESPIRATION RATE: 14 BRPM | OXYGEN SATURATION: 98 % | BODY MASS INDEX: 27.86 KG/M2 | SYSTOLIC BLOOD PRESSURE: 93 MMHG

## 2021-01-12 DIAGNOSIS — E87.6 HYPOKALEMIA: Primary | ICD-10-CM

## 2021-01-12 LAB
-: NORMAL
ABSOLUTE EOS #: 0.1 K/UL (ref 0–0.4)
ABSOLUTE IMMATURE GRANULOCYTE: ABNORMAL K/UL (ref 0–0.3)
ABSOLUTE LYMPH #: 1.5 K/UL (ref 1–4.8)
ABSOLUTE MONO #: 0.3 K/UL (ref 0.1–1.2)
ANION GAP SERPL CALCULATED.3IONS-SCNC: 17 MMOL/L (ref 9–17)
BASOPHILS # BLD: 1 % (ref 0–2)
BASOPHILS ABSOLUTE: 0.1 K/UL (ref 0–0.2)
BUN BLDV-MCNC: 19 MG/DL (ref 8–23)
BUN/CREAT BLD: ABNORMAL (ref 9–20)
CALCIUM SERPL-MCNC: 9.9 MG/DL (ref 8.6–10.4)
CHLORIDE BLD-SCNC: 96 MMOL/L (ref 98–107)
CO2: 24 MMOL/L (ref 20–31)
CREAT SERPL-MCNC: 1.5 MG/DL (ref 0.7–1.2)
DIFFERENTIAL TYPE: ABNORMAL
EOSINOPHILS RELATIVE PERCENT: 1 % (ref 1–4)
GFR AFRICAN AMERICAN: 55 ML/MIN
GFR NON-AFRICAN AMERICAN: 46 ML/MIN
GFR SERPL CREATININE-BSD FRML MDRD: ABNORMAL ML/MIN/{1.73_M2}
GFR SERPL CREATININE-BSD FRML MDRD: ABNORMAL ML/MIN/{1.73_M2}
GLUCOSE BLD-MCNC: 115 MG/DL (ref 70–99)
HCT VFR BLD CALC: 45.8 % (ref 41–53)
HEMOGLOBIN: 15.2 G/DL (ref 13.5–17.5)
IMMATURE GRANULOCYTES: ABNORMAL %
LYMPHOCYTES # BLD: 19 % (ref 24–44)
MCH RBC QN AUTO: 30.2 PG (ref 26–34)
MCHC RBC AUTO-ENTMCNC: 33.1 G/DL (ref 31–37)
MCV RBC AUTO: 91.1 FL (ref 80–100)
MONOCYTES # BLD: 4 % (ref 2–11)
NRBC AUTOMATED: ABNORMAL PER 100 WBC
PDW BLD-RTO: 14.3 % (ref 12.5–15.4)
PLATELET # BLD: 172 K/UL (ref 140–450)
PLATELET ESTIMATE: ABNORMAL
PMV BLD AUTO: 9.6 FL (ref 6–12)
POTASSIUM SERPL-SCNC: 2.6 MMOL/L (ref 3.7–5.3)
POTASSIUM SERPL-SCNC: 2.9 MMOL/L (ref 3.7–5.3)
RBC # BLD: 5.02 M/UL (ref 4.5–5.9)
RBC # BLD: ABNORMAL 10*6/UL
REASON FOR REJECTION: NORMAL
SEG NEUTROPHILS: 75 % (ref 36–66)
SEGMENTED NEUTROPHILS ABSOLUTE COUNT: 6 K/UL (ref 1.8–7.7)
SODIUM BLD-SCNC: 137 MMOL/L (ref 135–144)
WBC # BLD: 8 K/UL (ref 3.5–11)
WBC # BLD: ABNORMAL 10*3/UL
ZZ NTE CLEAN UP: ORDERED TEST: NORMAL
ZZ NTE WITH NAME CLEAN UP: SPECIMEN SOURCE: NORMAL

## 2021-01-12 PROCEDURE — 99285 EMERGENCY DEPT VISIT HI MDM: CPT

## 2021-01-12 PROCEDURE — 80048 BASIC METABOLIC PNL TOTAL CA: CPT

## 2021-01-12 PROCEDURE — 85025 COMPLETE CBC W/AUTO DIFF WBC: CPT

## 2021-01-12 PROCEDURE — 93005 ELECTROCARDIOGRAM TRACING: CPT | Performed by: EMERGENCY MEDICINE

## 2021-01-12 PROCEDURE — 84132 ASSAY OF SERUM POTASSIUM: CPT

## 2021-01-12 PROCEDURE — 36415 COLL VENOUS BLD VENIPUNCTURE: CPT

## 2021-01-12 PROCEDURE — 6370000000 HC RX 637 (ALT 250 FOR IP): Performed by: EMERGENCY MEDICINE

## 2021-01-12 RX ORDER — POTASSIUM CHLORIDE 20 MEQ/1
40 TABLET, EXTENDED RELEASE ORAL ONCE
Status: COMPLETED | OUTPATIENT
Start: 2021-01-12 | End: 2021-01-12

## 2021-01-12 RX ORDER — POTASSIUM CHLORIDE 20 MEQ/1
20 TABLET, EXTENDED RELEASE ORAL ONCE
Status: COMPLETED | OUTPATIENT
Start: 2021-01-12 | End: 2021-01-12

## 2021-01-12 RX ORDER — POTASSIUM CHLORIDE 20 MEQ/1
20 TABLET, EXTENDED RELEASE ORAL 2 TIMES DAILY
Qty: 6 TABLET | Refills: 0 | Status: SHIPPED | OUTPATIENT
Start: 2021-01-12 | End: 2021-07-26 | Stop reason: ALTCHOICE

## 2021-01-12 RX ADMIN — POTASSIUM CHLORIDE 40 MEQ: 20 TABLET, EXTENDED RELEASE ORAL at 17:06

## 2021-01-12 RX ADMIN — POTASSIUM CHLORIDE 20 MEQ: 20 TABLET, EXTENDED RELEASE ORAL at 16:26

## 2021-01-12 NOTE — ED PROVIDER NOTES
[facility]      Pt Name: Abdullahi Terry  MRN: [id number]  [place] 1945  Date of evaluation: 1/12/2021      CHIEF COMPLAINT       Chief Complaint   Patient presents with    Other     Send here by PCP for low potassium. Bloodwork done this morning at [facility]. HISTORY OF PRESENT ILLNESS      The patient presents with low potassium. His blood was drawn at [facility] and he was found to have a potassium of 2.8 today. He did not normally take potassium supplements. He takes hydrochlorothiazide. He is not having leg cramps, tremors, chest pain, or any other symptoms. REVIEW OF SYSTEMS       All systems reviewed and negative unless noted in HPI. The patient denies fever or constitutional symptoms. Denies vision change. Denies any sore throat or rhinorrhea. Denies any neck pain or stiffness. Denies chest pain or shortness of breath. No nausea,  vomiting or diarrhea. Denies any dysuria. Denies urinary frequency or hematuria. Denies musculoskeletal injury or pain. Denies any weakness, numbness or focal neurologic deficit. Denies any skin rash or edema. No recent psychiatric issues. Takes blood thinner. Denies any polyuria, polydypsia or history of immunocompromise. PAST MEDICAL HISTORY    has a past medical history of Atrial fibrillation ([facility] [address] Cancer ([address] GERD (gastroesophageal reflux disease), Glaucoma, Gout, Hx of blood clots, Hyperlipidemia, Hypertension, and Unspecified cerebral artery occlusion with cerebral infarction. SURGICAL HISTORY      has a past surgical history that includes Prostatectomy (2005); Colonoscopy; polypectomy; Colonoscopy (02/08/2018); and Colonoscopy (N/A, 2/8/2018).     CURRENT MEDICATIONS       Previous Medications    ACETAMINOPHEN (TYLENOL) 500 MG TABLET    Take 500 mg by mouth every 6 hours as needed for Pain    ALLOPURINOL (ZYLOPRIM) 100 MG TABLET    Take 100 mg by mouth 2 times daily. ASPIRIN 81 MG TABLET    Take 81 mg by mouth daily. ATORVASTATIN (LIPITOR) 20 MG TABLET    Take 20 mg by mouth daily. BIMATOPROST (LUMIGAN) 0.01 % SOLN OPHTHALMIC DROPS    Place 1 drop into both eyes nightly. DILTIAZEM (DILACOR XR) 240 MG XR CAPSULE    Take 240 mg by mouth daily. FOLIC ACID (FOLVITE) 1 MG TABLET    Take 1 mg by mouth every other day. HYDRALAZINE (APRESOLINE) 25 MG TABLET    Take 25 mg by mouth 2 times daily. HYDROCHLOROTHIAZIDE (HYDRODIURIL) 25 MG TABLET    Take 25 mg by mouth daily    IPRATROPIUM-ALBUTEROL (COMBIVENT IN)    Inhale 2 puffs into the lungs daily as needed. LEUPROLIDE (ELIGARD) 7.5 MG KIT SC INJECTION        METOPROLOL (LOPRESSOR) 50 MG TABLET    Take 50 mg by mouth 2 times daily. SIMBRINZA 1-0.2 % SUSP    INSTILL 1 DROP INTO EACH EYE TWICE DAILY    WARFARIN (COUMADIN) 3 MG TABLET    Take 3 mg by mouth daily As directed by Yamel Cruz [address]     has No Known Allergies. FAMILY HISTORY     He indicated that his mother is . He indicated that his father is . He indicated that only one of his three sisters is alive. He indicated that only one of his three brothers is alive. family history includes Diabetes in his brother and father; Heart Attack (age of onset: 40) in his sister; Kidney Disease in his father; Stroke in his mother. SOCIAL HISTORY      reports that he has quit smoking. He has never used smokeless tobacco. He reports that he does not drink alcohol or use drugs. PHYSICAL EXAM     INITIAL VITALS:  height is 5' 11\" (1.803 m) and weight is 90.3 kg (199 lb). His oral temperature is 97.6 °F (36.4 °C). His blood pressure is 93/57 (abnormal) and his pulse is 81. His respiration is 14 and oxygen saturation is 98%. The patient is alert and oriented, in no apparent distress. HEENT is atraumatic. Pupils are PERRL at 4 mm with normal extraocular motion.     Mucous membranes moist.    Neck is supple with no lymphadenopathy. No JVD. No meningismus. Heart sounds irregular rate and rhythm with no gallops, murmurs, or rubs. Lungs clear, no wheezes, rales or rhonchi. Abdomen: soft, nontender with no pain to palpation. Musculoskeletal exam shows no evidence of trauma. Normal distal pulses in all extremities. Skin: no rash or edema. Neurological exam reveals cranial nerves 2 through 12 grossly intact. Patient has equal  and normal deep tendon reflexes. Psychiatric: no hallucinations or suicidal ideation. Lymphatics.:  No lymphadenopathy. DIFFERENTIAL DIAGNOSIS/ MDM:     Hypokalemia, arrhythmia    DIAGNOSTIC RESULTS     EKG: All EKG's are interpreted by the Emergency Department Physician who either signs or Co-signs this chart in the absence of a cardiologist.    A. fib 83 with nonspecific ST change. No morphologic change compared to 5 years ago. Axis -19, QRS 86, .       LABS:  Results for orders placed or performed during the hospital encounter of 01/12/21   CBC Auto Differential   Result Value Ref Range    WBC 8.0 3.5 - 11.0 k/uL    RBC 5.02 4.5 - 5.9 m/uL    Hemoglobin 15.2 13.5 - 17.5 g/dL    Hematocrit 45.8 41 - 53 %    MCV 91.1 80 - 100 fL    MCH 30.2 26 - 34 pg    MCHC 33.1 31 - 37 g/dL    RDW 14.3 12.5 - 15.4 %    Platelets 885 401 - 510 k/uL    MPV 9.6 6.0 - 12.0 fL    NRBC Automated NOT REPORTED per 100 WBC    Differential Type NOT REPORTED     Seg Neutrophils 75 (H) 36 - 66 %    Lymphocytes 19 (L) 24 - 44 %    Monocytes 4 2 - 11 %    Eosinophils % 1 1 - 4 %    Basophils 1 0 - 2 %    Immature Granulocytes NOT REPORTED 0 %    Segs Absolute 6.00 1.8 - 7.7 k/uL    Absolute Lymph # 1.50 1.0 - 4.8 k/uL    Absolute Mono # 0.30 0.1 - 1.2 k/uL    Absolute Eos # 0.10 0.0 - 0.4 k/uL    Basophils Absolute 0.10 0.0 - 0.2 k/uL    Absolute Immature Granulocyte NOT REPORTED 0.00 - 0.30 k/uL    WBC Morphology NOT REPORTED     RBC Morphology NOT REPORTED 20 MEQ EXTENDED RELEASE TABLET    Take 1 tablet by mouth 2 times daily       (Please note that portions of this note were completed with a voice recognition program.  Efforts were made to edit the dictations but occasionally words are mis-transcribed.)    Lilly MD   Attending Emergency Physician       Jacqulynn Essex, MD  01/12/21 6704

## 2021-01-12 NOTE — ED NOTES
Lab called to notify pt's potaissum is 2.9.      Raissa Danielle [facility]  01/12/21 7498

## 2021-01-14 LAB
EKG ATRIAL RATE: 357 BPM
EKG Q-T INTERVAL: 338 MS
EKG QRS DURATION: 86 MS
EKG QTC CALCULATION (BAZETT): 397 MS
EKG R AXIS: -19 DEGREES
EKG T AXIS: -77 DEGREES
EKG VENTRICULAR RATE: 83 BPM

## 2021-01-16 ENCOUNTER — HOSPITAL ENCOUNTER (OUTPATIENT)
Facility: CLINIC | Age: 76
Discharge: HOME | End: 2021-01-16
Payer: MEDICARE

## 2021-01-16 LAB
ANION GAP SERPL CALCULATED.3IONS-SCNC: 15 MMOL/L (ref 9–17)
BUN BLDV-MCNC: 16 MG/DL (ref 8–23)
BUN/CREAT BLD: ABNORMAL (ref 9–20)
CALCIUM SERPL-MCNC: 9.2 MG/DL (ref 8.6–10.4)
CHLORIDE BLD-SCNC: 105 MMOL/L (ref 98–107)
CO2: 19 MMOL/L (ref 20–31)
CREAT SERPL-MCNC: 1.48 MG/DL (ref 0.7–1.2)
GFR AFRICAN AMERICAN: 56 ML/MIN
GFR NON-AFRICAN AMERICAN: 46 ML/MIN
GFR SERPL CREATININE-BSD FRML MDRD: ABNORMAL ML/MIN/{1.73_M2}
GFR SERPL CREATININE-BSD FRML MDRD: ABNORMAL ML/MIN/{1.73_M2}
GLUCOSE BLD-MCNC: 104 MG/DL (ref 70–99)
POTASSIUM SERPL-SCNC: 3.6 MMOL/L (ref 3.7–5.3)
SODIUM BLD-SCNC: 139 MMOL/L (ref 135–144)

## 2021-01-16 PROCEDURE — 36415 COLL VENOUS BLD VENIPUNCTURE: CPT

## 2021-01-16 PROCEDURE — 80048 BASIC METABOLIC PNL TOTAL CA: CPT

## 2021-01-18 ENCOUNTER — HOSPITAL ENCOUNTER (OUTPATIENT)
Dept: PHARMACY | Age: 76
Setting detail: THERAPIES SERIES
Discharge: HOME | End: 2021-01-18
Payer: MEDICARE

## 2021-01-18 VITALS — TEMPERATURE: 95.7 F

## 2021-01-18 DIAGNOSIS — I48.91 ATRIAL FIBRILLATION, UNSPECIFIED TYPE (HCC): ICD-10-CM

## 2021-01-18 LAB
INR BLD: 2.3
PROTIME: 27.8 SECONDS

## 2021-01-18 PROCEDURE — 85610 PROTHROMBIN TIME: CPT

## 2021-01-18 PROCEDURE — 99211 OFF/OP EST MAY X REQ PHY/QHP: CPT

## 2021-01-18 RX ORDER — BRINZOLAMIDE/BRIMONIDINE TARTRATE 10; 2 MG/ML; MG/ML
SUSPENSION/ DROPS OPHTHALMIC
COMMUNITY
Start: 2020-10-23

## 2021-01-18 RX ORDER — LOSARTAN POTASSIUM 100 MG/1
TABLET ORAL
COMMUNITY
Start: 2021-01-11 | End: 2022-07-18 | Stop reason: SDUPTHER

## 2021-01-18 NOTE — PROGRESS NOTES
Patient seen in clinic for warfarin management due to atrial fibrillation with an INR goal of 2.0-3.0. Estimated duration of therapy is indefinite. Patient states compliant all of the time with regimen. No bleeding or thromboembolic side effects noted. No significant med or dietary changes. No significant recent illness or disease state changes. PT/INR done in office per protocol. INR is 2.3 which is therapeutic. Warfarin regimen will be continued at current dose of 1.5mg Sun/Wed and 3mg all other days. Will retest in 6 weeks. Patient understands dosing directions and information discussed. Dosing schedule and follow up appointment given to patient. Progress note routed to referring physicians office. Discussed with patient the Pharmacist Collaborative Practice Agreement. Patient provided verbal and/or electronic (ex. [employer]) consent to participate in the collaborative practice agreement between the pharmacist and referred patient. This is in lieu of paper consent due to COVID-19 precautions and the use of remote/virtual visits.        CLINICAL PHARMACY CONSULT: MED RECONCILIATION/REVIEW ADDENDUM    For Pharmacy Admin Tracking Only    PHSO: No  Total # of Interventions Recommended: 0  - Maintenance Safety Lab Monitoring #: 1  Total Interventions Accepted: 0  Time Spent (min): [address] PharmD

## 2021-01-19 ENCOUNTER — HOSPITAL ENCOUNTER (EMERGENCY)
Age: 76
Discharge: HOME | End: 2021-01-19
Attending: EMERGENCY MEDICINE
Payer: MEDICARE

## 2021-01-19 ENCOUNTER — APPOINTMENT (OUTPATIENT)
Dept: CT IMAGING | Age: 76
End: 2021-01-19
Payer: MEDICARE

## 2021-01-19 ENCOUNTER — APPOINTMENT (OUTPATIENT)
Dept: GENERAL RADIOLOGY | Age: 76
End: 2021-01-19
Payer: MEDICARE

## 2021-01-19 VITALS
HEIGHT: 71 IN | WEIGHT: 190 LBS | HEART RATE: 60 BPM | BODY MASS INDEX: 26.6 KG/M2 | OXYGEN SATURATION: 99 % | TEMPERATURE: 97.7 F | SYSTOLIC BLOOD PRESSURE: 136 MMHG | DIASTOLIC BLOOD PRESSURE: 79 MMHG | RESPIRATION RATE: 16 BRPM

## 2021-01-19 DIAGNOSIS — R07.9 CHEST PAIN, UNSPECIFIED TYPE: Primary | ICD-10-CM

## 2021-01-19 LAB
ABSOLUTE EOS #: 0.09 K/UL (ref 0–0.44)
ABSOLUTE IMMATURE GRANULOCYTE: 0.03 K/UL (ref 0–0.3)
ABSOLUTE LYMPH #: 1.26 K/UL (ref 1.1–3.7)
ABSOLUTE MONO #: 0.5 K/UL (ref 0.1–1.2)
ALBUMIN SERPL-MCNC: 4.2 G/DL (ref 3.5–5.2)
ALBUMIN/GLOBULIN RATIO: ABNORMAL (ref 1–2.5)
ALP BLD-CCNC: 54 U/L (ref 40–129)
ALT SERPL-CCNC: 22 U/L (ref 5–41)
ANION GAP SERPL CALCULATED.3IONS-SCNC: 14 MMOL/L (ref 9–17)
AST SERPL-CCNC: 28 U/L
BASOPHILS # BLD: 0 % (ref 0–2)
BASOPHILS ABSOLUTE: 0.04 K/UL (ref 0–0.2)
BILIRUB SERPL-MCNC: 0.52 MG/DL (ref 0.3–1.2)
BNP INTERPRETATION: ABNORMAL
BUN BLDV-MCNC: 14 MG/DL (ref 8–23)
BUN/CREAT BLD: 9 (ref 9–20)
CALCIUM SERPL-MCNC: 10.1 MG/DL (ref 8.6–10.4)
CHLORIDE BLD-SCNC: 105 MMOL/L (ref 98–107)
CO2: 20 MMOL/L (ref 20–31)
CREAT SERPL-MCNC: 1.64 MG/DL (ref 0.7–1.2)
DIFFERENTIAL TYPE: ABNORMAL
EOSINOPHILS RELATIVE PERCENT: 1 % (ref 1–4)
GFR AFRICAN AMERICAN: 50 ML/MIN
GFR NON-AFRICAN AMERICAN: 41 ML/MIN
GFR SERPL CREATININE-BSD FRML MDRD: ABNORMAL ML/MIN/{1.73_M2}
GFR SERPL CREATININE-BSD FRML MDRD: ABNORMAL ML/MIN/{1.73_M2}
GLUCOSE BLD-MCNC: 92 MG/DL (ref 70–99)
HCT VFR BLD CALC: 42.9 % (ref 40.7–50.3)
HEMOGLOBIN: 13.8 G/DL (ref 13–17)
IMMATURE GRANULOCYTES: 0 %
INR BLD: 2.3
LYMPHOCYTES # BLD: 14 % (ref 24–43)
MCH RBC QN AUTO: 29.9 PG (ref 25.2–33.5)
MCHC RBC AUTO-ENTMCNC: 32.2 G/DL (ref 28.4–34.8)
MCV RBC AUTO: 93.1 FL (ref 82.6–102.9)
MONOCYTES # BLD: 5 % (ref 3–12)
MYOGLOBIN: 84 NG/ML (ref 28–72)
MYOGLOBIN: 98 NG/ML (ref 28–72)
NRBC AUTOMATED: 0 PER 100 WBC
PARTIAL THROMBOPLASTIN TIME: 37.8 SEC (ref 23.9–33.8)
PDW BLD-RTO: 13.6 % (ref 11.8–14.4)
PLATELET # BLD: 191 K/UL (ref 138–453)
PLATELET ESTIMATE: ABNORMAL
PMV BLD AUTO: 11.4 FL (ref 8.1–13.5)
POTASSIUM SERPL-SCNC: 3.8 MMOL/L (ref 3.7–5.3)
PRO-BNP: 3303 PG/ML
PROTHROMBIN TIME: 25.4 SEC (ref 11.5–14.2)
RBC # BLD: 4.61 M/UL (ref 4.21–5.77)
RBC # BLD: ABNORMAL 10*6/UL
SEG NEUTROPHILS: 80 % (ref 36–65)
SEGMENTED NEUTROPHILS ABSOLUTE COUNT: 7.4 K/UL (ref 1.5–8.1)
SODIUM BLD-SCNC: 139 MMOL/L (ref 135–144)
TOTAL PROTEIN: 7.9 G/DL (ref 6.4–8.3)
TROPONIN INTERP: ABNORMAL
TROPONIN INTERP: ABNORMAL
TROPONIN T: ABNORMAL NG/ML
TROPONIN T: ABNORMAL NG/ML
TROPONIN, HIGH SENSITIVITY: 14 NG/L (ref 0–22)
TROPONIN, HIGH SENSITIVITY: 15 NG/L (ref 0–22)
WBC # BLD: 9.3 K/UL (ref 3.5–11.3)
WBC # BLD: ABNORMAL 10*3/UL

## 2021-01-19 PROCEDURE — 83874 ASSAY OF MYOGLOBIN: CPT

## 2021-01-19 PROCEDURE — 85025 COMPLETE CBC W/AUTO DIFF WBC: CPT

## 2021-01-19 PROCEDURE — 2580000003 HC RX 258: Performed by: EMERGENCY MEDICINE

## 2021-01-19 PROCEDURE — 84484 ASSAY OF TROPONIN QUANT: CPT

## 2021-01-19 PROCEDURE — 99283 EMERGENCY DEPT VISIT LOW MDM: CPT

## 2021-01-19 PROCEDURE — 85610 PROTHROMBIN TIME: CPT

## 2021-01-19 PROCEDURE — 83880 ASSAY OF NATRIURETIC PEPTIDE: CPT

## 2021-01-19 PROCEDURE — 71045 X-RAY EXAM CHEST 1 VIEW: CPT

## 2021-01-19 PROCEDURE — 85730 THROMBOPLASTIN TIME PARTIAL: CPT

## 2021-01-19 PROCEDURE — 6360000004 HC RX CONTRAST MEDICATION: Performed by: EMERGENCY MEDICINE

## 2021-01-19 PROCEDURE — 71260 CT THORAX DX C+: CPT

## 2021-01-19 PROCEDURE — 80053 COMPREHEN METABOLIC PANEL: CPT

## 2021-01-19 PROCEDURE — 93005 ELECTROCARDIOGRAM TRACING: CPT | Performed by: EMERGENCY MEDICINE

## 2021-01-19 PROCEDURE — 6370000000 HC RX 637 (ALT 250 FOR IP): Performed by: PHYSICIAN ASSISTANT

## 2021-01-19 RX ORDER — 0.9 % SODIUM CHLORIDE 0.9 %
80 INTRAVENOUS SOLUTION INTRAVENOUS ONCE
Status: COMPLETED | OUTPATIENT
Start: 2021-01-19 | End: 2021-01-19

## 2021-01-19 RX ORDER — SODIUM CHLORIDE 0.9 % (FLUSH) 0.9 %
10 SYRINGE (ML) INJECTION PRN
Status: DISCONTINUED | OUTPATIENT
Start: 2021-01-19 | End: 2021-01-19 | Stop reason: HOSPADM

## 2021-01-19 RX ORDER — ASPIRIN 81 MG/1
243 TABLET, CHEWABLE ORAL ONCE
Status: COMPLETED | OUTPATIENT
Start: 2021-01-19 | End: 2021-01-19

## 2021-01-19 RX ADMIN — ASPIRIN 243 MG: 81 TABLET, CHEWABLE ORAL at 12:35

## 2021-01-19 RX ADMIN — SODIUM CHLORIDE 80 ML: 9 INJECTION, SOLUTION INTRAVENOUS at 14:32

## 2021-01-19 RX ADMIN — IOPAMIDOL 75 ML: 755 INJECTION, SOLUTION INTRAVENOUS at 14:32

## 2021-01-19 RX ADMIN — Medication 10 ML: at 14:32

## 2021-01-19 NOTE — ED PROVIDER NOTES
[address] [place] [address] ED  EMERGENCY DEPARTMENT ENCOUNTER   ATTENDING ATTESTATION     Pt Name: Ayaz Corral  MRN: [id number]  [place] 1945  Date of evaluation: 1/19/21       Aayz Corral is a 76 y.o. male who presents with Chest Pain      MDM:     Patient's EKG shows A. fib with a rate of 56, QRS and QTC intervals unremarkable, patient has normal axis no ST elevations or depressions, no significant T wave changes. Nonspecific EKG. Vitals:   Vitals:    01/19/21 1150   BP: 136/79   Pulse: 60   Resp: 16   Temp: 97.7 °F (36.5 °C)   TempSrc: Oral   SpO2: 99%   Weight: 190 lb (86.2 kg)   Height: 5' 11\" (1.803 m)         I personally evaluated and examined the patient in conjunction with the Midlevel provider and agree with the assessment, treatment plan, and disposition of the patient as recorded by the midlevel. I performed a history and physical examination of the patient and discussed management with the midlevel. I reviewed the midlevels note and agree with the documented findings and plan of care. Any areas of disagreement are noted on the chart. I was personally present for the key portions of any procedures. I have documented in the chart those procedures where I was not present during the key portions. I have personally reviewed all images and agree with the midlevel's interpretation. I have reviewed the emergency nurses triage note. I agree with the chief complaint, past medical history, past surgical history, allergies, medications, social and family history as documented unless otherwise noted.     Cisco Saavedra MD  Attending Emergency  Physician                 Saumya Mcgee MD  01/19/21 9785

## 2021-01-19 NOTE — ED PROVIDER NOTES
[address] [place] [address] ED  [facility]      Pt Name: Fer Carney  MRN: [id number]  [place] 1945  Date ofevaluation: 1/19/2021  Provider: Jason Perera Dr       Chief Complaint   Patient presents with    Chest Pain         HISTORY OF PRESENT ILLNESS  (Location/Symptom, Timing/Onset, Context/Setting, Quality, Duration, Modifying Factors, Severity.)   Fer Carney is a 76 y.o. male who presents to the emergency department with chest pain intermittently over the last 3 to 4 weeks. Patient reports history of TIAs. No true history of blood clots according to the patient. Denies any DVT or PE in the past.  Currently takes Coumadin for A. fib. Reports going to see his new cardiologist on Thursday for the first time. Pain in the chest usually last for a few hours when it comes. It then goes away for a couple of days. Denies any exertional symptoms. No shortness of breath. No radiation. States that often when he uses his inhaler and makes the pain go away. No diaphoresis or nausea or vomiting. Nursing Notes were reviewed. ALLERGIES     Patient has no known allergies.     CURRENT MEDICATIONS       Discharge Medication List as of 1/19/2021  4:19 PM      CONTINUE these medications which have NOT CHANGED    Details   losartan (COZAAR) 100 MG tablet TAKE 1 TABLET BY MOUTH ONCE DAILYHistorical Med      SIMBRINZA 1-0.2 % SUSP INSTILL 1 DROP INTO EACH EYE TWICE DAILY, DAWHistorical Med      potassium chloride (KLOR-CON M) 20 MEQ extended release tablet Take 1 tablet by mouth 2 times daily, Disp-6 tablet, R-0Normal      leuprolide (ELIGARD) 7.5 MG KIT SC injection Historical Med      hydrochlorothiazide (HYDRODIURIL) 25 MG tablet Take 25 mg by mouth dailyHistorical Med      acetaminophen (TYLENOL) 500 MG tablet Take 500 mg by mouth every 6 hours as needed for PainHistorical Med      hydrALAZINE (APRESOLINE) 25 MG tablet Take 25 mg by mouth 2 times daily.Historical Med      metoprolol (LOPRESSOR) 50 MG tablet Take 50 mg by mouth 2 times daily. Historical Med      diltiazem (DILACOR XR) 240 MG XR capsule Take 240 mg by mouth daily. Historical Med      warfarin (COUMADIN) 3 MG tablet Take 3 mg by mouth daily As directed by [facility]      atorvastatin (LIPITOR) 20 MG tablet Take 20 mg by mouth daily. Historical Med      Ipratropium-Albuterol (COMBIVENT IN) Inhale 2 puffs into the lungs daily as needed. Historical Med      bimatoprost (LUMIGAN) 0.01 % SOLN ophthalmic drops Place 1 drop into both eyes nightly. Historical Med      aspirin 81 MG tablet Take 81 mg by mouth daily. Historical Med      allopurinol (ZYLOPRIM) 100 MG tablet Take 100 mg by mouth 2 times daily. Historical Med      folic acid (FOLVITE) 1 MG tablet Take 1 mg by mouth every other day. Historical Med             PAST MEDICAL HISTORY         Diagnosis Date    Atrial fibrillation ([facility] [address] 2005    Cancer [facility]) 2004    prostate    GERD (gastroesophageal reflux disease)     Glaucoma     Gout     Hx of blood clots     leg - patient unsure which leg    Hyperlipidemia     Hypertension     Unspecified cerebral artery occlusion with cerebral infarction     no deficits       SURGICAL HISTORY           Procedure Laterality Date    COLONOSCOPY      COLONOSCOPY  2018    COLONOSCOPY N/A 2018    COLONOSCOPY (PT IN [facility])  WITH COLD BX performed by Micah Wagner MD at [address] [place] [address] POLYPECTOMY      benign with colonoscopy    PROSTATECTOMY           FAMILY HISTORY           Problem Relation Age of Onset    Stroke Mother     Kidney Disease Father     Diabetes Father     Heart Attack Sister 40    Diabetes Brother      Family Status   Relation Name Status    Mother     [facility] Father     [facility] Sister     [facility] Brother      Sister  Alive    Brother  Alive    Sister  (Not Specified)    Brother  (Not Specified)        SOCIAL HISTORY reports that he has quit smoking. He has never used smokeless tobacco. He reports that he does not drink alcohol or use drugs. REVIEW OFSYSTEMS    (2-9 systems for level 4, 10 or more for level 5)   Review of Systems    Except as noted above the remainder of the review of systems was reviewed and negative. PHYSICAL EXAM    (up to 7 for level 4, 8 or more for level 5)     ED Triage Vitals [01/19/21 1150]   BP Temp Temp Source Pulse Resp SpO2 Height Weight   136/79 97.7 °F (36.5 °C) Oral 60 16 99 % 5' 11\" (1.803 m) 190 lb (86.2 kg)      Physical Exam  Constitutional:       Appearance: He is well-developed. HENT:      Head: Normocephalic and atraumatic. Neck:      Musculoskeletal: Normal range of motion and neck supple. Cardiovascular:      Rate and Rhythm: Normal rate and regular rhythm. Pulmonary:      Effort: Pulmonary effort is normal.      Breath sounds: Normal breath sounds. Abdominal:      General: There is no distension. Palpations: Abdomen is soft. Tenderness: There is no abdominal tenderness. Musculoskeletal: Normal range of motion. Skin:     General: Skin is warm. Neurological:      Mental Status: He is alert and oriented to person, place, and time.    Psychiatric:         Behavior: Behavior normal.                 DIAGNOSTIC RESULTS     EKG: All EKG's are interpreted by the Emergency Department Physician who either signs or Co-signs this chart in the absence of a cardiologist.        RADIOLOGY:   Non-plain film images such as CT, Ultrasound and MRI are read by the radiologist. Plain radiographic images arevisualized and preliminarily interpreted by the emergency physician with the below findings:        Interpretation per the Radiologist below, if available at thetime of this note:          ED BEDSIDE ULTRASOUND:   Performed by ED Physician - none    LABS:  Labs Reviewed   CBC WITH AUTO DIFFERENTIAL - Abnormal; Notable for the following components:       Result Value Seg Neutrophils 80 (*)     Lymphocytes 14 (*)     All other components within normal limits   COMPREHENSIVE METABOLIC PANEL - Abnormal; Notable for the following components:    CREATININE 1.64 (*)     GFR Non- 41 (*)     GFR  50 (*)     All other components within normal limits   TROP/MYOGLOBIN - Abnormal; Notable for the following components:    Myoglobin 84 (*)     All other components within normal limits   BRAIN NATRIURETIC PEPTIDE - Abnormal; Notable for the following components:    Pro-BNP 3,303 (*)     All other components within normal limits   TROP/MYOGLOBIN - Abnormal; Notable for the following components:    Myoglobin 98 (*)     All other components within normal limits   PROTIME-INR - Abnormal; Notable for the following components:    Protime 25.4 (*)     All other components within normal limits   APTT - Abnormal; Notable for the following components:    PTT 37.8 (*)     All other components within normal limits   TROP/MYOGLOBIN       All other labs were within normal range or not returned as of this dictation. EMERGENCY DEPARTMENT COURSE and DIFFERENTIAL DIAGNOSIS/MDM:   Vitals:    Vitals:    01/19/21 1150   BP: 136/79   Pulse: 60   Resp: 16   Temp: 97.7 °F (36.5 °C)   TempSrc: Oral   SpO2: 99%   Weight: 190 lb (86.2 kg)   Height: 5' 11\" (1.803 m)       4:15 PM EST  Case discussed with dr Rosa Robles. He agrees to the patient can be discharged and seen by him in the office on Thursday. We are in agreement with this plan of care. Patient seems to be safe to be discharged home. Patient requested to be discharged home as well. 2 sets of cardiac enzymes are negative. No PE.    CONSULTS:  IP CONSULT TO CARDIOLOGY    PROCEDURES:  Procedures        FINAL IMPRESSION      1.  Chest pain, unspecified type          DISPOSITION/PLAN   DISPOSITION Decision To Discharge 01/19/2021 04:18:45 PM      PATIENTREFERRED TO:   Art Grayson MD  [address] [place] [address]  [address]  [place] [address]  [phone]    In 3 days      [facility] [address] [place] [address]  [phone]    In 2 days        DISCHARGE MEDICATIONS:     Discharge Medication List as of 1/19/2021  4:19 PM              (Please note that portions of this note were completed with a voice recognition program.  Efforts were made to edit thedictations but occasionally words are mis-transcribed.)    LALO Adam PA-C  01/19/21 0879

## 2021-01-20 LAB
EKG ATRIAL RATE: 300 BPM
EKG Q-T INTERVAL: 424 MS
EKG QRS DURATION: 86 MS
EKG QTC CALCULATION (BAZETT): 409 MS
EKG R AXIS: 13 DEGREES
EKG T AXIS: -23 DEGREES
EKG VENTRICULAR RATE: 56 BPM

## 2021-01-20 PROCEDURE — 93010 ELECTROCARDIOGRAM REPORT: CPT | Performed by: INTERNAL MEDICINE

## 2021-03-01 ENCOUNTER — HOSPITAL ENCOUNTER (OUTPATIENT)
Dept: PHARMACY | Age: 76
Setting detail: THERAPIES SERIES
Discharge: HOME | End: 2021-03-01
Payer: MEDICARE

## 2021-03-01 VITALS — TEMPERATURE: 96.6 F

## 2021-03-01 DIAGNOSIS — I48.91 ATRIAL FIBRILLATION, UNSPECIFIED TYPE (HCC): ICD-10-CM

## 2021-03-01 LAB
INR BLD: 2.8
PROTIME: 33.6 SECONDS

## 2021-03-01 PROCEDURE — 99211 OFF/OP EST MAY X REQ PHY/QHP: CPT

## 2021-03-01 PROCEDURE — 85610 PROTHROMBIN TIME: CPT

## 2021-03-01 NOTE — PROGRESS NOTES
Patient seen in clinic for warfarin management due to atrial fibrillation with an INR goal of 2.0-3.0. Estimated duration of therapy is indefinite. Patient states compliant all of the time with regimen. No bleeding or thromboembolic side effects noted. No significant med or dietary changes. No significant recent illness or disease state changes. PT/INR done in office per protocol. INR is 2.8 which is therapeutic. Warfarin regimen will be continued at current dose of 1.5mg Sun/Wed and 3mg all other days. Will retest in 6 weeks. Patient understands dosing directions and information discussed. Dosing schedule and follow up appointment given to patient. Progress note routed to referring physicians office. Discussed with patient the Pharmacist Collaborative Practice Agreement. Patient provided verbal and/or electronic (ex. [employer]) consent to participate in the collaborative practice agreement between the pharmacist and referred patient. This is in lieu of paper consent due to COVID-19 precautions and the use of remote/virtual visits.        CLINICAL PHARMACY CONSULT: MED RECONCILIATION/REVIEW ADDENDUM    For Pharmacy Admin Tracking Only    PHSO: No  Total # of Interventions Recommended: 0  - Maintenance Safety Lab Monitoring #: 1  Total Interventions Accepted: 0  Time Spent (min): [address] PharmD

## 2021-03-08 ENCOUNTER — HOSPITAL ENCOUNTER (OUTPATIENT)
Dept: PREADMISSION TESTING | Age: 76
Discharge: HOME | End: 2021-03-12
Payer: MEDICARE

## 2021-03-08 VITALS — BODY MASS INDEX: 27.72 KG/M2 | HEIGHT: 71 IN | WEIGHT: 198 LBS

## 2021-03-08 NOTE — PROGRESS NOTES
, anesthesia, was contacted and informed of patient's history and planned surgery. Medical clearance requested. Surgery scheduling will notify Dr. Henny Ba office who will be responsible for making sure the clearance is obtained and is in the chart for surgery.

## 2021-03-08 NOTE — PROGRESS NOTES
Pre-op Instructions For Out-Patient Surgery    Medication Instructions:  · Please stop herbs and any supplements now (includes vitamins and minerals). · Please contact your surgeon and prescribing physician for pre-op instructions for any blood thinners. · If you have inhalers/aerosol treatments at home, please use them the morning of your surgery and bring the inhalers with you to the hospital.    · Please take the following medications the morning of your surgery with a sip of water:Hydralazine, Metoprolol, Diltiazem, Losartan,- Relates will take his AM meds. when he gets home from procedure. Surgery Instructions:  1. After midnight before surgery:  Do not eat or drink anything, including water, mints, gum, and hard candy. You may brush your teeth without swallowing. No smoking, chewing tobacco, or street drugs. Please shower or bathe before surgery. 2. Please do not wear any cologne, lotion, powder, deodorant, jewelry, piercings, perfume, makeup, nail polish, hair accessories, or hair spray on the day of surgery. Wear loose comfortable clothing. 3. Leave your valuables at home. Bring a storage case for any glasses/contacts. 4. An adult who is responsible for you MUST drive you home and should be with you for the first 24 hours after surgery. The Day of Surgery:  · Arrive at [address] [place] [address] Surgery Entrance at the time directed by your surgeon and check in at the desk. · If you have a living will or healthcare power of , please bring a copy. · You will be taken to the pre-op holding area where you will be prepared for surgery. A physical assessment will be performed by a nurse practitioner or house officer. Your IV will be started and you will meet your anesthesiologist.    · We are currently limiting visitors to only one designated person in the pre-op holding area.   When you go to surgery, your family will be directed to the surgical waiting room, where the doctor should speak with them after your surgery. · After surgery, you will be taken to the recovery room then when you are awake and stable you will go to the short stay unit for preparation to be discharged. Only your one designated person is allowed to come to short stay for your discharge. Instructions read to [facility] and verbalizes understanding.

## 2021-03-18 ENCOUNTER — HOSPITAL ENCOUNTER (OUTPATIENT)
Dept: LAB | Age: 76
Setting detail: SPECIMEN
Discharge: HOME | End: 2021-03-18
Payer: MEDICARE

## 2021-03-18 DIAGNOSIS — Z01.818 PREOP TESTING: Primary | ICD-10-CM

## 2021-03-18 PROCEDURE — U0005 INFEC AGEN DETEC AMPLI PROBE: HCPCS

## 2021-03-18 PROCEDURE — U0003 INFECTIOUS AGENT DETECTION BY NUCLEIC ACID (DNA OR RNA); SEVERE ACUTE RESPIRATORY SYNDROME CORONAVIRUS 2 (SARS-COV-2) (CORONAVIRUS DISEASE [COVID-19]), AMPLIFIED PROBE TECHNIQUE, MAKING USE OF HIGH THROUGHPUT TECHNOLOGIES AS DESCRIBED BY CMS-2020-01-R: HCPCS

## 2021-03-19 ENCOUNTER — ANESTHESIA EVENT (OUTPATIENT)
Dept: ENDOSCOPY | Age: 76
End: 2021-03-19
Payer: MEDICARE

## 2021-03-19 LAB
SARS-COV-2: NORMAL
SARS-COV-2: NOT DETECTED
SOURCE: NORMAL

## 2021-03-22 ENCOUNTER — ANESTHESIA (OUTPATIENT)
Dept: ENDOSCOPY | Age: 76
End: 2021-03-22
Payer: MEDICARE

## 2021-03-22 ENCOUNTER — HOSPITAL ENCOUNTER (OUTPATIENT)
Age: 76
Setting detail: OUTPATIENT SURGERY
Discharge: HOME | End: 2021-03-22
Attending: SURGERY | Admitting: SURGERY
Payer: MEDICARE

## 2021-03-22 VITALS
SYSTOLIC BLOOD PRESSURE: 126 MMHG | BODY MASS INDEX: 27.72 KG/M2 | DIASTOLIC BLOOD PRESSURE: 78 MMHG | WEIGHT: 198 LBS | TEMPERATURE: 97 F | HEART RATE: 76 BPM | OXYGEN SATURATION: 98 % | RESPIRATION RATE: 16 BRPM | HEIGHT: 71 IN

## 2021-03-22 VITALS
OXYGEN SATURATION: 100 % | DIASTOLIC BLOOD PRESSURE: 63 MMHG | TEMPERATURE: 96.8 F | RESPIRATION RATE: 13 BRPM | SYSTOLIC BLOOD PRESSURE: 93 MMHG

## 2021-03-22 PROCEDURE — 7100000000 HC PACU RECOVERY - FIRST 15 MIN: Performed by: SURGERY

## 2021-03-22 PROCEDURE — 2720000010 HC SURG SUPPLY STERILE: Performed by: SURGERY

## 2021-03-22 PROCEDURE — 88305 TISSUE EXAM BY PATHOLOGIST: CPT

## 2021-03-22 PROCEDURE — 2580000003 HC RX 258: Performed by: ANESTHESIOLOGY

## 2021-03-22 PROCEDURE — 2500000003 HC RX 250 WO HCPCS: Performed by: NURSE ANESTHETIST, CERTIFIED REGISTERED

## 2021-03-22 PROCEDURE — 6360000002 HC RX W HCPCS: Performed by: NURSE ANESTHETIST, CERTIFIED REGISTERED

## 2021-03-22 PROCEDURE — 7100000001 HC PACU RECOVERY - ADDTL 15 MIN: Performed by: SURGERY

## 2021-03-22 PROCEDURE — 3700000000 HC ANESTHESIA ATTENDED CARE: Performed by: SURGERY

## 2021-03-22 PROCEDURE — 2709999900 HC NON-CHARGEABLE SUPPLY: Performed by: SURGERY

## 2021-03-22 PROCEDURE — 3700000001 HC ADD 15 MINUTES (ANESTHESIA): Performed by: SURGERY

## 2021-03-22 PROCEDURE — 7100000031 HC ASPR PHASE II RECOVERY - ADDTL 15 MIN: Performed by: SURGERY

## 2021-03-22 PROCEDURE — 7100000030 HC ASPR PHASE II RECOVERY - FIRST 15 MIN: Performed by: SURGERY

## 2021-03-22 PROCEDURE — 3609010400 HC COLONOSCOPY POLYPECTOMY HOT BIOPSY: Performed by: SURGERY

## 2021-03-22 RX ORDER — LIDOCAINE HYDROCHLORIDE 10 MG/ML
1 INJECTION, SOLUTION EPIDURAL; INFILTRATION; INTRACAUDAL; PERINEURAL
Status: DISCONTINUED | OUTPATIENT
Start: 2021-03-22 | End: 2021-03-22 | Stop reason: HOSPADM

## 2021-03-22 RX ORDER — PHENYLEPHRINE HYDROCHLORIDE 10 MG/ML
INJECTION INTRAVENOUS PRN
Status: DISCONTINUED | OUTPATIENT
Start: 2021-03-22 | End: 2021-03-22 | Stop reason: SDUPTHER

## 2021-03-22 RX ORDER — PROPOFOL 10 MG/ML
INJECTION, EMULSION INTRAVENOUS PRN
Status: DISCONTINUED | OUTPATIENT
Start: 2021-03-22 | End: 2021-03-22 | Stop reason: SDUPTHER

## 2021-03-22 RX ORDER — SODIUM CHLORIDE, SODIUM LACTATE, POTASSIUM CHLORIDE, CALCIUM CHLORIDE 600; 310; 30; 20 MG/100ML; MG/100ML; MG/100ML; MG/100ML
INJECTION, SOLUTION INTRAVENOUS CONTINUOUS
Status: DISCONTINUED | OUTPATIENT
Start: 2021-03-22 | End: 2021-03-22 | Stop reason: HOSPADM

## 2021-03-22 RX ORDER — LIDOCAINE HYDROCHLORIDE 20 MG/ML
INJECTION, SOLUTION EPIDURAL; INFILTRATION; INTRACAUDAL; PERINEURAL PRN
Status: DISCONTINUED | OUTPATIENT
Start: 2021-03-22 | End: 2021-03-22 | Stop reason: SDUPTHER

## 2021-03-22 RX ORDER — SODIUM CHLORIDE 0.9 % (FLUSH) 0.9 %
10 SYRINGE (ML) INJECTION PRN
Status: DISCONTINUED | OUTPATIENT
Start: 2021-03-22 | End: 2021-03-22 | Stop reason: HOSPADM

## 2021-03-22 RX ADMIN — PHENYLEPHRINE HYDROCHLORIDE 100 MCG: 10 INJECTION INTRAVENOUS at 08:03

## 2021-03-22 RX ADMIN — LIDOCAINE HYDROCHLORIDE 60 MG: 20 INJECTION, SOLUTION EPIDURAL; INFILTRATION; INTRACAUDAL; PERINEURAL at 07:57

## 2021-03-22 RX ADMIN — PHENYLEPHRINE HYDROCHLORIDE 100 MCG: 10 INJECTION INTRAVENOUS at 08:05

## 2021-03-22 RX ADMIN — GLUCAGON HYDROCHLORIDE 1 MG: KIT at 08:13

## 2021-03-22 RX ADMIN — PROPOFOL 50 MG: 10 INJECTION, EMULSION INTRAVENOUS at 07:58

## 2021-03-22 RX ADMIN — PROPOFOL 150 MG: 10 INJECTION, EMULSION INTRAVENOUS at 07:57

## 2021-03-22 RX ADMIN — SODIUM CHLORIDE, POTASSIUM CHLORIDE, SODIUM LACTATE AND CALCIUM CHLORIDE: 600; 310; 30; 20 INJECTION, SOLUTION INTRAVENOUS at 07:30

## 2021-03-22 RX ADMIN — PHENYLEPHRINE HYDROCHLORIDE 100 MCG: 10 INJECTION INTRAVENOUS at 08:21

## 2021-03-22 ASSESSMENT — PULMONARY FUNCTION TESTS
PIF_VALUE: 15
PIF_VALUE: 14
PIF_VALUE: 16
PIF_VALUE: 16
PIF_VALUE: 15
PIF_VALUE: 16
PIF_VALUE: 16
PIF_VALUE: 17
PIF_VALUE: 17
PIF_VALUE: 16
PIF_VALUE: 8
PIF_VALUE: 17
PIF_VALUE: 15
PIF_VALUE: 24
PIF_VALUE: 0
PIF_VALUE: 16
PIF_VALUE: 17
PIF_VALUE: 15
PIF_VALUE: 9
PIF_VALUE: 15
PIF_VALUE: 2
PIF_VALUE: 17

## 2021-03-22 ASSESSMENT — PAIN SCALES - GENERAL
PAINLEVEL_OUTOF10: 0
PAINLEVEL_OUTOF10: 0

## 2021-03-22 ASSESSMENT — ENCOUNTER SYMPTOMS
COUGH: 0
SORE THROAT: 0
WHEEZING: 0
SHORTNESS OF BREATH: 0
GASTROINTESTINAL NEGATIVE: 1

## 2021-03-22 ASSESSMENT — PAIN - FUNCTIONAL ASSESSMENT: PAIN_FUNCTIONAL_ASSESSMENT: 0-10

## 2021-03-22 NOTE — OP NOTE
Operative Note      Patient: Ayaz Corral  YOB: 1945  MRN: [id number]    Date of Procedure: 3/22/2021    PROCEDURE NOTE    DATE OF PROCEDURE: 3/22/2021    SURGEON: Mark Ahumada MD    ASSISTANT: None    PREOPERATIVE DIAGNOSIS: History of colon polyp    POSTOPERATIVE DIAGNOSIS: Grade 1 internal hemorrhoid. Sigmoid and scattered diverticulosis. Small hyperplastic looking polyp left colon removed could not be retrieved. Cecal polyp removed and retrieved with hot snare polypectomy and retrieved with a Colton Ric net with resolution clip application at the polypectomy site    OPERATION: Total colonoscopy to cecum with intubation of terminal ileum and polypectomies with hot snare polypectomy technique with placement of resolution clip at the cecal polypectomy site. ANESTHESIA: General    ESTIMATED BLOOD LOSS: None    COMPLICATIONS: None     SPECIMENS:  Was Obtained: Cecal polyp    HISTORY: The patient is a 76y.o. year old male with history of above preop diagnosis. I recommended colonoscopy with possible biopsy or polypectomy and I explained the risk, benefits, expected outcome, and alternatives to the procedure. Risks included but are not limited to bleeding, infection, respiratory distress, hypotension, and perforation of the colon and possibility of missing a lesion. The patient understands and is in agreement. PROCEDURE: The patient was given IV conscious sedation. The patient's SPO2 remained above 90% throughout the procedure. Digital rectal exam was normal.  The colonoscope was inserted through the anus into the rectum and advanced under direct vision to the cecum without difficulty. Terminal ileum was examined for approximately 2 inches. The prep was good. Findings:  Terminal ileum: normal    Cecum/Ascending colon: abnormal: Cecal polyp at the level of appendicular orifice removed and retrieved with hot snare polypectomy technique and using a Flores net.   Resolution clip application performed at the polypectomy site. Transverse colon: normal    Descending/Sigmoid colon: abnormal: Sigmoid and scattered diverticulosis. Small hyperplastic-looking left colon polyp removed with cold snare polypectomy technique but could not be retrieved. Very  small benign-appearing polyp. Rectum/Anus: examined in normal and retroflexed positions and was abnormal: Grade 1 internal hemorrhoid. Withdrawal Time was (minutes): 18      Next screening colonoscopy: 3 years. If screening is less than 10 years the recommended reason is due:polyps    The colon was decompressed. While withdrawing the scope the above findings were verified and the scope was removed. The patient tolerated the procedure and conscious sedation without unusual events. In the recovery room patient was examined and remains hemodynamically stable. Discharge home when criteria met. Recommendations/Plan:   1. F/U Biopsies  2. F/U In Office as instructed  3. Discussed with the family  4. High fiber diet   5.  Precautions to avoid constipation    Electronically signed by Dave Casiano MD  on 3/22/2021 at 8:34 AM

## 2021-03-22 NOTE — ANESTHESIA POSTPROCEDURE EVALUATION
POST- ANESTHESIA EVALUATION       Pt Name: Kate Arnett  MRN: [id number]  YOB: 1945  Date of evaluation: 3/22/2021  Time:  9:46 AM      /78   Pulse 76   Temp 97 °F (36.1 °C) (Temporal)   Resp 16   Ht 5' 11\" (1.803 m)   Wt 198 lb (89.8 kg)   SpO2 98%   BMI 27.62 kg/m²      Consciousness Level  Awake  Cardiopulmonary Status  Stable  Pain Adequately Treated YES  Nausea / Vomiting  NO  Adequate Hydration  YES  Anesthesia Related Complications NONE      Electronically signed by Donna Ro MD on 3/22/2021 at 9:46 AM       Department of Anesthesiology  Postprocedure Note    Patient: Kate Arnett  MRN: [id number]  YOB: 1945  Date of evaluation: 3/22/2021  Time:  9:46 AM     Procedure Summary     Date: 03/22/21 Room / Location: [place] [address] 03 / 250 [facility]    Anesthesia Start: 4379 Anesthesia Stop: Miriam Riser    Procedure: COLONOSCOPY POLYPECTOMY HOT BIOPSY (N/A Anus) Diagnosis: (HISTORY OF COLON POLYPS)    Surgeons: Alonso Alston MD Responsible Provider: Donna Ro MD    Anesthesia Type: general ASA Status: 3          Anesthesia Type: general    Sukhjinder Phase I: Sukhjinder Score: 10    Sukhjinder Phase II: Sukhjinder Score: 10    Last vitals: Reviewed and per EMR flowsheets.        Anesthesia Post Evaluation

## 2021-03-22 NOTE — H&P
HISTORY and [id number]       NAME:  Isaac Narayan  MRN: [id number]   YOB: 1945   Date: 3/22/2021   Age: 76 y.o. Gender: male     COMPLAINT AND PRESENT HISTORY:   Isaac Narayan is 76 y.o.,  male, undergoing DIAGNOSTIC COLONOSCOPY for HX OF COLON POLYPS per Dr. Aurea Gallo. Patient states that last colonoscopy was about 3 years ago- polyps found at the time. Patient denies all of the following s/s: ABD pain, constipation, diarrhea (aside from colonoscopy prep), nausea, vomiting/hematemesis, fever/chills, recent unintended weight loss, appetite change, melena/hematochezia, dysphagia/pharyngitis/voice change. Review of additional significant medical hx:  HTN, A-FIB, HLD: Patient follows w/cardiology. Denies chest pain, palpitations, SOB, dizziness, leg swelling, headache. Current medications r/t condition: LOSARTAN, HYDRALAZINE, METOPROLOL, DILTIAZEM XR, LIPITOR, WARFARIN (HELD X5 DAYS)    HX OF CVA (2005): Patient states he could not talk for a couple of hours, currently has no deficits. GERD: States he takes some type of OTC medication for this- well controlled. HX OF BLOOD CLOT: Patient states he is unsure about hx of blood clot to his leg- he does believe it was in 2005. Denies calf pain, erythema, warmth, swelling. PROSTATE CA: Tx w/prostatectomy, he also takes medication for this. NPO status: Patient states they have been NPO since before midnight. Medications taken TODAY (with sip of water): LOSARTAN, HYDRALAZINE, METOPROLOL, ATORVASTATIN  Anticoagulation status: MAINTAINED ON WARFARIN- STOPPED X5 DAYS. HE ALSO TAKES DAILY BABY ASPIRIN, WHICH HE HAS CON'T DAILY. Prep fully completed: YES. Pertinent family hx: Denies family hx of esophageal and colon CA. Denies personal hx of MRSA infection. Denies any personal or family hx of previous complications w/anesthesia. Nicotine status: FORMER.   PAST MEDICAL HISTORY     Past Medical History:   Diagnosis Date    Atrial fibrillation ([facility] [address] 2005    Cancer [facility]) 2004    prostate    CKD (chronic kidney disease)     Elevated brain natriuretic peptide (BNP) level 01/19/2021    3,303    GERD (gastroesophageal reflux disease)     Glaucoma     Gout     History of colon polyps     Hx of blood clots     leg - patient unsure which leg    Hyperlipidemia     Hypertension     Unspecified cerebral artery occlusion with cerebral infarction 2005    no deficits    UTI (urinary tract infection)     Warfarin anticoagulation     Wears partial dentures     TOP       SURGICAL HISTORY       Past Surgical History:   Procedure Laterality Date    COLONOSCOPY      States total of 5-6 colonoscopies in the past    COLONOSCOPY N/A 02/08/2018    COLONOSCOPY (PT IN [facility])  WITH COLD BX performed by Dior Brennan MD at [address]      left.     POLYPECTOMY      benign with colonoscopy    PROSTATECTOMY  2005       SOCIAL HISTORY       Social History     Socioeconomic History    Marital status:      Spouse name: None    Number of children: 3    Years of education: None    Highest education level: None   Occupational History     Employer: RETIRED     Comment: recycling   Social Needs    Financial resource strain: None    Food insecurity     Worry: None     Inability: None    Transportation needs     Medical: None     Non-medical: None   Tobacco Use    Smoking status: Former Smoker    Smokeless tobacco: Never Used   Substance and Sexual Activity    Alcohol use: No     Comment: quit 20 years ago    Drug use: No    Sexual activity: None   Lifestyle    Physical activity     Days per week: None     Minutes per session: None    Stress: None   Relationships    Social connections     Talks on phone: None     Gets together: None     Attends Sabianism service: None     Active member of club or organization: None     Attends meetings of clubs or organizations: None     Relationship status: None    Intimate partner violence     Fear of current or ex partner: None     Emotionally abused: None     Physically abused: None     Forced sexual activity: None   Other Topics Concern    None   Social History Narrative    None       REVIEW OF SYSTEMS    No Known Allergies    No current facility-administered medications on file prior to encounter. Current Outpatient Medications on File Prior to Encounter   Medication Sig Dispense Refill    losartan (COZAAR) 100 MG tablet TAKE 1 TABLET BY MOUTH ONCE DAILY      SIMBRINZA 1-0.2 % SUSP INSTILL 1 DROP INTO EACH EYE TWICE DAILY      potassium chloride (KLOR-CON M) 20 MEQ extended release tablet Take 1 tablet by mouth 2 times daily 6 tablet 0    leuprolide (ELIGARD) 7.5 MG KIT SC injection Gets every 6 months.  acetaminophen (TYLENOL) 500 MG tablet Take 500 mg by mouth every 6 hours as needed for Pain      hydrALAZINE (APRESOLINE) 25 MG tablet Take 25 mg by mouth 2 times daily.  metoprolol (LOPRESSOR) 50 MG tablet Take 50 mg by mouth 2 times daily.  diltiazem (DILACOR XR) 240 MG XR capsule Take 240 mg by mouth daily.  warfarin (COUMADIN) 3 MG tablet Take 3 mg by mouth daily As directed by [facility]      atorvastatin (LIPITOR) 20 MG tablet Take 20 mg by mouth daily.  Ipratropium-Albuterol (COMBIVENT IN) Inhale 2 puffs into the lungs daily as needed.  bimatoprost (LUMIGAN) 0.01 % SOLN ophthalmic drops Place 1 drop into both eyes nightly.  aspirin 81 MG tablet Take 81 mg by mouth daily.  allopurinol (ZYLOPRIM) 100 MG tablet Take 100 mg by mouth 2 times daily.  folic acid (FOLVITE) 1 MG tablet Take 1 mg by mouth every other day. (Notation: Medications listed above are not currently reconciled at the signing of this H&P note, to be reconciled in pre-op per RN)    Review of Systems   Constitutional: Negative for chills and fever.    HENT: Negative for congestion, ear pain, postnasal drip and sore throat. Respiratory: Negative for cough, shortness of breath and wheezing. Cardiovascular: Negative for chest pain, palpitations and leg swelling. Gastrointestinal: Negative. Genitourinary: Negative. Negative for dysuria and frequency. Neurological: Negative for dizziness and headaches. Hematological: Does not bruise/bleed easily. GENERAL PHYSICAL EXAM     Vitals: Review current vital signs per RN flow sheet. GENERAL APPEARANCE:   Fartun Castillo is 76 y.o.,  male, not obese, nourished, conscious, alert. Does not appear to be in any distress or pain at this time. Physical Exam   Constitutional: He is oriented to person, place, and time. He appears well-developed and well-nourished. Non-toxic appearance. He does not have a sickly appearance. He does not appear ill. No distress. HENT:   Head: Normocephalic. Right Ear: External ear normal.   Left Ear: External ear normal.   Mouth/Throat: Oropharynx is clear and moist and mucous membranes are normal. He has dentures (TOP). No oropharyngeal exudate, posterior oropharyngeal edema, posterior oropharyngeal erythema or tonsillar abscesses. Eyes: Conjunctivae are normal. Right eye exhibits no discharge. Left eye exhibits no discharge. Cardiovascular: Normal rate, normal heart sounds and intact distal pulses. An irregularly irregular rhythm present. Pulses:       Radial pulses are 2+ on the right side and 2+ on the left side. Dorsalis pedis pulses are 2+ on the right side and 2+ on the left side. Posterior tibial pulses are 2+ on the right side and 2+ on the left side. Pulmonary/Chest: Effort normal and breath sounds normal. No accessory muscle usage. No respiratory distress. He has no decreased breath sounds. He has no wheezes. He has no rhonchi. He has no rales. Abdominal: Soft. Bowel sounds are normal. He exhibits no distension and no mass. There is no abdominal tenderness. There is no rebound and no guarding. Musculoskeletal: Normal range of motion. Right lower leg: He exhibits no tenderness and no swelling. Left lower leg: He exhibits no tenderness and no swelling. Neurological: He is alert and oriented to person, place, and time. Skin: Skin is warm and dry. He is not diaphoretic. Psychiatric: He has a normal mood and affect.  His behavior is normal.       RECENT LAB WORK     Lab Results   Component Value Date     01/19/2021    K 3.8 01/19/2021     01/19/2021    CO2 20 01/19/2021    BUN 14 01/19/2021    CREATININE 1.64 (H) 01/19/2021    GLUCOSE 92 01/19/2021    CALCIUM 10.1 01/19/2021    PROT 7.9 01/19/2021    LABALBU 4.2 01/19/2021    BILITOT 0.52 01/19/2021    ALKPHOS 54 01/19/2021    AST 28 01/19/2021    ALT 22 01/19/2021    LABGLOM 41 (L) 01/19/2021    GFRAA 50 (L) 01/19/2021     Lab Results   Component Value Date    WBC 9.3 01/19/2021    HGB 13.8 01/19/2021    HCT 42.9 01/19/2021    MCV 93.1 01/19/2021     01/19/2021     PROVISIONAL DIAGNOSES / SURGERY:      HX OF COLON POLYPS    DIAGNOSTIC COLONOSCOPY     Patient Active Problem List    Diagnosis Date Noted    Afib [facility]) 08/05/2019    Colon polyp 05/27/2014           CLAUS Cha CNP on 3/22/2021 at 6:32 AM

## 2021-03-22 NOTE — ANESTHESIA PRE PROCEDURE
Department of Anesthesiology  Preprocedure Note       Name:  Matt Smith   Age:  76 y.o.  :  1945                                          MRN:  [id number]         Date:  3/22/2021      Surgeon: Je Joseph):  Abdirahman Arguelles MD    Procedure: Procedure(s):  COLONOSCOPY DIAGNOSTIC    Medications prior to admission:   Prior to Admission medications    Medication Sig Start Date End Date Taking? Authorizing Provider   losartan (COZAAR) 100 MG tablet TAKE 1 TABLET BY MOUTH ONCE DAILY 21  Yes Historical Provider, MD   SIMBRINZA 1-0.2 % SUSP INSTILL 1 DROP INTO EACH EYE TWICE DAILY 10/23/20  Yes Historical Provider, MD   acetaminophen (TYLENOL) 500 MG tablet Take 500 mg by mouth every 6 hours as needed for Pain   Yes Historical Provider, MD   hydrALAZINE (APRESOLINE) 25 MG tablet Take 25 mg by mouth 2 times daily. Yes Historical Provider, MD   metoprolol (LOPRESSOR) 50 MG tablet Take 50 mg by mouth 2 times daily. Yes Historical Provider, MD   diltiazem (DILACOR XR) 240 MG XR capsule Take 240 mg by mouth daily. Yes Historical Provider, MD   warfarin (COUMADIN) 3 MG tablet Take 3 mg by mouth daily As directed by [address] [place] [address]   Yes Historical Provider, MD   atorvastatin (LIPITOR) 20 MG tablet Take 20 mg by mouth daily. Yes Historical Provider, MD   Ipratropium-Albuterol (COMBIVENT IN) Inhale 2 puffs into the lungs daily as needed. Yes Historical Provider, MD   bimatoprost (LUMIGAN) 0.01 % SOLN ophthalmic drops Place 1 drop into both eyes nightly. Yes Historical Provider, MD   aspirin 81 MG tablet Take 81 mg by mouth daily. Yes Historical Provider, MD   allopurinol (ZYLOPRIM) 100 MG tablet Take 100 mg by mouth 2 times daily. Yes Historical Provider, MD   folic acid (FOLVITE) 1 MG tablet Take 1 mg by mouth every other day.    Yes Historical Provider, MD   potassium chloride (KLOR-CON M) 20 MEQ extended release tablet Take 1 tablet by mouth 2 times daily 21   Frederick Cade MD leuprolide (ELIGARD) 7.5 MG KIT SC injection Gets every 6 months. 11/2/20   Historical Provider, MD       Current medications:    Current Facility-Administered Medications   Medication Dose Route Frequency Provider Last Rate Last Admin    lactated ringers infusion   Intravenous Continuous Madelyn Barker MD        sodium chloride flush 0.9 % injection 10 mL  10 mL Intravenous PRN Madelyn Barker MD        lidocaine PF 1 % injection 1 mL  1 mL Intradermal Once PRN Albert Solano MD           Allergies:  No Known Allergies    Problem List:    Patient Active Problem List   Diagnosis Code    Colon polyp K63.5    Afib ([facility] [address] I48.91       Past Medical History:        Diagnosis Date    Atrial fibrillation ([facility] [address] 2005    Cancer [facility]) 2004    prostate    CKD (chronic kidney disease)     Elevated brain natriuretic peptide (BNP) level 01/19/2021    3,303    GERD (gastroesophageal reflux disease)     Glaucoma     Gout     History of colon polyps     Hx of blood clots     leg - patient unsure which leg    Hyperlipidemia     Hypertension     Unspecified cerebral artery occlusion with cerebral infarction 2005    no deficits    UTI (urinary tract infection)     Warfarin anticoagulation     Wears partial dentures     TOP       Past Surgical History:        Procedure Laterality Date    COLONOSCOPY      States total of 5-6 colonoscopies in the past    COLONOSCOPY N/A 02/08/2018    COLONOSCOPY (PT IN [facility])  WITH COLD BX performed by Jer[facility] Mil[facility] at [facility].     POLYPECTOMY      benign with colonoscopy    PROSTATECTOMY  2005       Social History:    Social History     Tobacco Use    Smoking status: Former Smoker    Smokeless tobacco: Never Used   Substance Use Topics    Alcohol use: No     Comment: quit 30 years ago                                Counseling given: Not Answered      Vital Signs (Current):   Vitals:    03/22/21 0641   BP: 139/82   Pulse: 96   Resp: 18   Temp: 97.7 °F (36.5 °C)   TempSrc: Infrared   SpO2: 99%   Weight: 198 lb (89.8 kg)   Height: 5' 11\" (1.803 m)                                              BP Readings from Last 3 Encounters:   03/22/21 139/82   01/19/21 136/79   01/12/21 (!) 93/57       NPO Status:                                                                                 BMI:   Wt Readings from Last 3 Encounters:   03/22/21 198 lb (89.8 kg)   03/08/21 198 lb (89.8 kg)   01/19/21 190 lb (86.2 kg)     Body mass index is 27.62 kg/m². CBC:   Lab Results   Component Value Date    WBC 9.3 01/19/2021    RBC 4.61 01/19/2021    HGB 13.8 01/19/2021    HCT 42.9 01/19/2021    MCV 93.1 01/19/2021    RDW 13.6 01/19/2021     01/19/2021       CMP:   Lab Results   Component Value Date     01/19/2021    K 3.8 01/19/2021     01/19/2021    CO2 20 01/19/2021    BUN 14 01/19/2021    CREATININE 1.64 01/19/2021    GFRAA 50 01/19/2021    LABGLOM 41 01/19/2021    GLUCOSE 92 01/19/2021    PROT 7.9 01/19/2021    CALCIUM 10.1 01/19/2021    BILITOT 0.52 01/19/2021    ALKPHOS 54 01/19/2021    AST 28 01/19/2021    ALT 22 01/19/2021       POC Tests: No results for input(s): POCGLU, POCNA, POCK, POCCL, POCBUN, POCHEMO, POCHCT in the last 72 hours.     Coags:   Lab Results   Component Value Date    PROTIME 33.6 03/01/2021    INR 2.8 03/01/2021    APTT 37.8 01/19/2021       HCG (If Applicable): No results found for: PREGTESTUR, PREGSERUM, HCG, HCGQUANT     ABGs: No results found for: PHART, PO2ART, OLD8JFJ, NKZ3XVH, BEART, M8EAVFMT     Type & Screen (If Applicable):  No results found for: LABABO, LABRH    Drug/Infectious Status (If Applicable):  No results found for: HIV, HEPCAB    COVID-19 Screening (If Applicable):   Lab Results   Component Value Date    COVID19 Not Detected 03/18/2021           Anesthesia Evaluation  Patient summary reviewed and Nursing notes reviewed no history of anesthetic complications:   Airway: Mallampati: III  TM distance: >3 FB   Neck ROM: full  Mouth opening: > = 3 FB Dental:    (+) upper dentures      Pulmonary: breath sounds clear to auscultation      (-) rhonchi, wheezes and rales                           Cardiovascular:    (+) hypertension: no interval change, dysrhythmias: atrial fibrillation,     (-) murmur, weak pulses,  friction rub, systolic click, carotid bruit,  JVD and peripheral edema    ECG reviewed  Rhythm: irregular                      Neuro/Psych:   (+) CVA: no interval change,             GI/Hepatic/Renal:   (+) GERD: no interval change, renal disease: CRI,           Endo/Other:                      ROS comment: Glucoma Abdominal:           Vascular: negative vascular ROS. Anesthesia Plan      general     ASA 3       Induction: intravenous. Anesthetic plan and risks discussed with patient. Plan discussed with CRNA.                   Tayla Bui MD   3/22/2021

## 2021-03-23 LAB — SURGICAL PATHOLOGY REPORT: NORMAL

## 2021-04-12 ENCOUNTER — HOSPITAL ENCOUNTER (OUTPATIENT)
Dept: PHARMACY | Age: 76
Setting detail: THERAPIES SERIES
Discharge: HOME | End: 2021-04-12
Payer: MEDICARE

## 2021-04-12 DIAGNOSIS — I48.91 ATRIAL FIBRILLATION, UNSPECIFIED TYPE (HCC): ICD-10-CM

## 2021-04-12 LAB
INR BLD: 2.7
PROTIME: 32.8 SECONDS

## 2021-04-12 PROCEDURE — 99211 OFF/OP EST MAY X REQ PHY/QHP: CPT

## 2021-04-12 PROCEDURE — 85610 PROTHROMBIN TIME: CPT

## 2021-04-12 NOTE — PROGRESS NOTES
Patient seen in clinic for warfarin management due to atrial fibrillation with an INR goal of 2.0-3.0. Estimated duration of therapy is indefinite. Patient states compliant all of the time with regimen. No bleeding or thromboembolic side effects noted. Held for 5 days prior to colonoscopy. No dietary changes. No significant recent illness or disease state changes. PT/INR done in office per protocol. INR is 2.7 which is therapeutic. Warfarin regimen will be continued at current dose 1.5 mg every Sun, Wed; 3 mg all other days. Will retest in 6 weeks. Patient understands dosing directions and information discussed. Dosing schedule and follow up appointment given to patient. Progress note routed to referring physicians office. Discussed with patient the Pharmacist Collaborative Practice Agreement. Patient provided verbal and/or electronic (ex. [employer]t) consent to participate in the collaborative practice agreement between the pharmacist and referred patient. This is in lieu of paper consent due to COVID-19 precautions and the use of remote/virtual visits.        CLINICAL PHARMACY CONSULT: MED RECONCILIATION/REVIEW ADDENDUM    For Pharmacy Admin Tracking Only    PHSO: No  Total # of Interventions Recommended: 0  - Maintenance Safety Lab Monitoring #: 1  Total Interventions Accepted: 0  Time Spent (min): 15    Yuko FayD Student

## 2021-05-24 ENCOUNTER — HOSPITAL ENCOUNTER (OUTPATIENT)
Dept: PHARMACY | Age: 76
Setting detail: THERAPIES SERIES
Discharge: HOME | End: 2021-05-24
Payer: MEDICARE

## 2021-05-24 DIAGNOSIS — I48.91 ATRIAL FIBRILLATION, UNSPECIFIED TYPE (HCC): Primary | ICD-10-CM

## 2021-05-24 LAB
INR BLD: 2.9
PROTIME: 34.4 SECONDS

## 2021-05-24 PROCEDURE — 99211 OFF/OP EST MAY X REQ PHY/QHP: CPT

## 2021-05-24 PROCEDURE — 85610 PROTHROMBIN TIME: CPT

## 2021-05-24 NOTE — PROGRESS NOTES
Patient seen in clinic for warfarin management due to atrial fibrillation with an INR goal of 2.0-3.0. Estimated duration of therapy is indefinite. Patient states compliant all of the time with regimen. No bleeding or thromboembolic side effects noted. No significant med or dietary changes. No significant recent illness or disease state changes. PT/INR done in office per protocol. INR is 2.9 which is therapeutic. Warfarin regimen will be continued at current dose 1.5mg Sun/Wed, 3mg all other days. Will retest in 6 weeks. Patient understands dosing directions and information discussed. Dosing schedule and follow up appointment given to patient. Progress note routed to referring physicians office. Discussed with patient the Pharmacist Collaborative Practice Agreement. Patient provided verbal and/or electronic (ex. [employer]) consent to participate in the collaborative practice agreement between the pharmacist and referred patient. This is in lieu of paper consent due to COVID-19 precautions and the use of remote/virtual visits.        For Pharmacy Admin Tracking Only     Intervention Detail:    Total # of Interventions Recommended: 0   Total # of Interventions Accepted: 0   Time Spent (min): 15

## 2021-06-23 ENCOUNTER — HOSPITAL ENCOUNTER (OUTPATIENT)
Facility: CLINIC | Age: 76
Discharge: HOME | End: 2021-06-25
Payer: MEDICARE

## 2021-06-23 ENCOUNTER — HOSPITAL ENCOUNTER (OUTPATIENT)
Dept: GENERAL RADIOLOGY | Facility: CLINIC | Age: 76
Discharge: HOME | End: 2021-06-25
Payer: MEDICARE

## 2021-06-23 DIAGNOSIS — M17.12 OSTEOARTHRITIS OF LEFT KNEE, UNSPECIFIED OSTEOARTHRITIS TYPE: ICD-10-CM

## 2021-06-23 PROCEDURE — 73562 X-RAY EXAM OF KNEE 3: CPT

## 2021-07-05 ENCOUNTER — HOSPITAL ENCOUNTER (OUTPATIENT)
Dept: PHARMACY | Age: 76
Setting detail: THERAPIES SERIES
Discharge: HOME | End: 2021-07-05
Payer: MEDICARE

## 2021-07-05 DIAGNOSIS — I48.91 ATRIAL FIBRILLATION, UNSPECIFIED TYPE (HCC): Primary | ICD-10-CM

## 2021-07-05 LAB
INR BLD: 3.4
PROTIME: 41.3 SECONDS

## 2021-07-05 PROCEDURE — 99212 OFFICE O/P EST SF 10 MIN: CPT

## 2021-07-05 PROCEDURE — 85610 PROTHROMBIN TIME: CPT

## 2021-07-26 ENCOUNTER — HOSPITAL ENCOUNTER (OUTPATIENT)
Dept: PHARMACY | Age: 76
Setting detail: THERAPIES SERIES
Discharge: HOME | End: 2021-07-26
Payer: MEDICARE

## 2021-07-26 ENCOUNTER — APPOINTMENT (OUTPATIENT)
Dept: PHARMACY | Age: 76
End: 2021-07-26
Payer: MEDICARE

## 2021-07-26 DIAGNOSIS — I48.91 ATRIAL FIBRILLATION, UNSPECIFIED TYPE (HCC): Primary | ICD-10-CM

## 2021-07-26 LAB
INR BLD: 3.3
PROTIME: 39.3 SECONDS

## 2021-07-26 PROCEDURE — 85610 PROTHROMBIN TIME: CPT

## 2021-07-26 PROCEDURE — 99212 OFFICE O/P EST SF 10 MIN: CPT

## 2021-07-26 RX ORDER — TIMOLOL MALEATE 5 MG/ML
SOLUTION/ DROPS OPHTHALMIC
COMMUNITY
Start: 2021-07-23

## 2021-07-26 RX ORDER — FAMOTIDINE 20 MG/1
20 TABLET, FILM COATED ORAL
COMMUNITY

## 2021-07-26 NOTE — PROGRESS NOTES
Patient seen in clinic for warfarin management due to atrial fibrillation with an INR goal of 2.0-3.0. Estimated duration of therapy is indefinite. Patient states compliant all of the time with regimen. No bleeding or thromboembolic side effects noted. No significant med or dietary changes. No significant recent illness or disease state changes. PT/INR done in office per protocol. INR is 3.3 which is supratherapeutic with no identified cause. Warfarin regimen will be decreased to 1.5mg x 1, then reduce maintenance dose to 1.5mg Sun/Wed/Fri and 3mg all other days. Will retest in 3 weeks. Patient understands dosing directions and information discussed. Dosing schedule and follow up appointment given to patient. Progress note routed to referring physicians office. Discussed with patient the Pharmacist Collaborative Practice Agreement. Patient provided verbal and/or electronic (ex. [employer]) consent to participate in the collaborative practice agreement between the pharmacist and referred patient. This is in lieu of paper consent due to COVID-19 precautions and the use of remote/virtual visits.        For Pharmacy Admin Tracking Only     Intervention Detail: Dose Adjustment: 1, reason: Therapy De-escalation   Total # of Interventions Recommended: 1   Total # of Interventions Accepted: 1   Time Spent (min): 15

## 2021-08-16 ENCOUNTER — HOSPITAL ENCOUNTER (OUTPATIENT)
Dept: PHARMACY | Age: 76
Setting detail: THERAPIES SERIES
Discharge: HOME | End: 2021-08-16
Payer: MEDICARE

## 2021-08-16 DIAGNOSIS — I48.91 ATRIAL FIBRILLATION, UNSPECIFIED TYPE (HCC): Primary | ICD-10-CM

## 2021-08-16 LAB
INR BLD: 2.5
PROTIME: 29.5 SECONDS

## 2021-08-16 PROCEDURE — 99211 OFF/OP EST MAY X REQ PHY/QHP: CPT

## 2021-08-16 PROCEDURE — 85610 PROTHROMBIN TIME: CPT

## 2021-08-16 NOTE — PROGRESS NOTES
Patient seen in clinic for warfarin management due to atrial fibrillation with an INR goal of 2.0-3.0. Estimated duration of therapy is indefinite.      Patient states compliant all of the time with regimen. No bleeding or thromboembolic side effects noted. No significant med or dietary changes. No significant recent illness or disease state changes.       PT/INR done in office per protocol. INR is 2.5 which is therapeutic.     Warfarin will be continued with 1.5mg Sun/Wed/Fri and 3mg all other days. Will retest in 4 weeks.     Patient understands dosing directions and information discussed. Dosing schedule and follow up appointment given to patient. Progress note routed to referring physicians office. Discussed with patient the Pharmacist Collaborative Practice Agreement.  Patient provided verbal and/or electronic (ex. [employer]) consent to participate in the collaborative practice agreement between the pharmacist and referred patient. This is in lieu of paper consent due to COVID-19 precautions and the use of remote/virtual visits.        For Pharmacy Admin Tracking Only     Intervention Detail:    Total # of Interventions Recommended: 0   Total # of Interventions Accepted: 0   Time Spent (min): 15

## 2021-09-20 ENCOUNTER — HOSPITAL ENCOUNTER (OUTPATIENT)
Dept: PHARMACY | Age: 76
Setting detail: THERAPIES SERIES
Discharge: HOME | End: 2021-09-20
Payer: MEDICARE

## 2021-09-20 DIAGNOSIS — I48.91 ATRIAL FIBRILLATION, UNSPECIFIED TYPE (HCC): Primary | ICD-10-CM

## 2021-09-20 LAB
INR BLD: 2.4
PROTIME: 28.3 SECONDS

## 2021-09-20 PROCEDURE — 99211 OFF/OP EST MAY X REQ PHY/QHP: CPT

## 2021-09-20 PROCEDURE — 85610 PROTHROMBIN TIME: CPT

## 2021-09-20 NOTE — PROGRESS NOTES
Patient seen in clinic for warfarin management due to atrial fibrillation with an INR goal of 2.0-3.0. Estimated duration of therapy is indefinite. Patient states compliant all of the time with regimen. No bleeding or thromboembolic side effects noted. No significant med or dietary changes. No significant recent illness or disease state changes. PT/INR done in office per protocol. INR is 2.4 which is therapeutic. Warfarin regimen will be continued at current dose of 1.5mg Sun/Wed/Fri and 3mg all other days. Will retest in 4 weeks. Patient understands dosing directions and information discussed. Dosing schedule and follow up appointment given to patient. Progress note routed to referring physicians office. Discussed with patient the Pharmacist Collaborative Practice Agreement. Patient provided verbal and/or electronic (ex. [employer]) consent to participate in the collaborative practice agreement between the pharmacist and referred patient. This is in lieu of paper consent due to COVID-19 precautions and the use of remote/virtual visits.        For Pharmacy Admin Tracking Only     Intervention Detail:    Total # of Interventions Recommended: 0   Total # of Interventions Accepted: 0   Time Spent (min): 15

## 2021-10-18 ENCOUNTER — TELEPHONE (OUTPATIENT)
Dept: PHARMACY | Age: 76
End: 2021-10-18

## 2021-10-18 DIAGNOSIS — I48.91 ATRIAL FIBRILLATION, UNSPECIFIED TYPE (HCC): Primary | ICD-10-CM

## 2021-10-19 ENCOUNTER — HOSPITAL ENCOUNTER (OUTPATIENT)
Dept: PHARMACY | Age: 76
Setting detail: THERAPIES SERIES
Discharge: HOME | End: 2021-10-19
Payer: MEDICARE

## 2021-10-19 DIAGNOSIS — I48.91 ATRIAL FIBRILLATION, UNSPECIFIED TYPE (HCC): Primary | ICD-10-CM

## 2021-10-19 LAB
INR BLD: 2.5
PROTIME: 29.7 SECONDS

## 2021-10-19 PROCEDURE — 85610 PROTHROMBIN TIME: CPT

## 2021-10-19 PROCEDURE — 99211 OFF/OP EST MAY X REQ PHY/QHP: CPT

## 2021-10-19 NOTE — PROGRESS NOTES
Patient seen in clinic for warfarin management due to atrial fibrillation with an INR goal of 2.0-3.0. Estimated duration of therapy is indefinite. Patient states compliant all of the time with regimen. No bleeding or thromboembolic side effects noted. No significant med or dietary changes. No significant recent illness or disease state changes. PT/INR done in office per protocol. INR is 2.5 which is therapeutic. Warfarin regimen will be continued at current dose 1.5mg Sun/Wed/Fri, 3mg all other days. Will retest in 4 weeks. Patient understands dosing directions and information discussed. Dosing schedule and follow up appointment given to patient. Progress note routed to referring physicians office. Discussed with patient the Pharmacist Collaborative Practice Agreement. Patient provided verbal and/or electronic (ex. [employer]) consent to participate in the collaborative practice agreement between the pharmacist and referred patient. This is in lieu of paper consent due to COVID-19 precautions and the use of remote/virtual visits.        For Pharmacy Admin Tracking Only     Intervention Detail:    Total # of Interventions Recommended: 0   Total # of Interventions Accepted: 0   Time Spent (min): 15

## 2021-11-23 ENCOUNTER — HOSPITAL ENCOUNTER (OUTPATIENT)
Dept: PHARMACY | Age: 76
Setting detail: THERAPIES SERIES
Discharge: HOME | End: 2021-11-23
Payer: MEDICARE

## 2021-11-23 DIAGNOSIS — I48.91 ATRIAL FIBRILLATION, UNSPECIFIED TYPE (HCC): Primary | ICD-10-CM

## 2021-11-23 LAB
INR BLD: 2
PROTIME: 24.3 SECONDS

## 2021-11-23 PROCEDURE — 99211 OFF/OP EST MAY X REQ PHY/QHP: CPT

## 2021-11-23 PROCEDURE — 85610 PROTHROMBIN TIME: CPT

## 2021-11-23 NOTE — PROGRESS NOTES
Patient seen in clinic for warfarin management due to atrial fibrillation with an INR goal of 2.0-3.0. Estimated duration of therapy is indefinite. Patient states he was off warfarin for 4 days last week due to a procedure on his eye on 11/17. This was cleared by Dr. Espana Friday per patient. No bleeding or thromboembolic side effects noted. No significant med or dietary changes. No significant recent illness or disease state changes. PT/INR done in office per protocol. INR is 2.0 which is therapeutic. Warfarin regimen will be continued at current dose of 1.5mg sun/wed/fri and 3mg all other days. Will retest in 6 weeks. Patient understands dosing directions and information discussed. Dosing schedule and follow up appointment given to patient. Progress note routed to referring physicians office. Discussed with patient the Pharmacist Collaborative Practice Agreement. Patient provided verbal and/or electronic (ex. [employer]) consent to participate in the collaborative practice agreement between the pharmacist and referred patient. This is in lieu of paper consent due to COVID-19 precautions and the use of remote/virtual visits.        For Pharmacy Admin Tracking Only     Intervention Detail:    Total # of Interventions Recommended: 0   Total # of Interventions Accepted: 0   Time Spent (min): 15

## 2022-01-05 ENCOUNTER — HOSPITAL ENCOUNTER (OUTPATIENT)
Dept: PHARMACY | Age: 77
Setting detail: THERAPIES SERIES
Discharge: HOME | End: 2022-01-05
Payer: MEDICARE

## 2022-01-05 DIAGNOSIS — I48.91 ATRIAL FIBRILLATION, UNSPECIFIED TYPE (HCC): Primary | ICD-10-CM

## 2022-01-05 LAB
INR BLD: 2.8
PROTIME: 34 SECONDS

## 2022-01-05 PROCEDURE — 99211 OFF/OP EST MAY X REQ PHY/QHP: CPT

## 2022-01-05 PROCEDURE — 85610 PROTHROMBIN TIME: CPT

## 2022-01-05 NOTE — PROGRESS NOTES
Patient seen in clinic for warfarin management due to atrial fibrillation with an INR goal of 2.0-3.0.  Estimated duration of therapy is indefinite    Patient states he has been taking 1.5mg sun/wed and 3mg all other days instead of instructed regimen of 1.5mg sun/wed/fri and 3mg all other day.s Also, he was off warfarin for 4 days for another cataract surgery in December. He resumed with usual dose afterward. No bleeding or thromboembolic side effects noted. No significant med or dietary changes. No significant recent illness or disease state changes. PT/INR done in office per protocol. INR is 2.8 which is therapeutic. Warfarin regimen will be continued at current dose of 1.5mg sun/wed and 3mg all other days. Will retest in 4 weeks. Patient understands dosing directions and information discussed. Dosing schedule and follow up appointment given to patient. Progress note routed to referring physicians office. Discussed with patient the Pharmacist Collaborative Practice Agreement. Patient provided verbal and/or electronic (ex. [employer]) consent to participate in the collaborative practice agreement between the pharmacist and referred patient. This is in lieu of paper consent due to COVID-19 precautions and the use of remote/virtual visits.        For Pharmacy Admin Tracking Only     Intervention Detail: Adherence Monitorin   Total # of Interventions Recommended: 1   Total # of Interventions Accepted: 1   Time Spent (min): 15

## 2022-02-07 ENCOUNTER — HOSPITAL ENCOUNTER (OUTPATIENT)
Dept: PHARMACY | Age: 77
Setting detail: THERAPIES SERIES
Discharge: HOME | End: 2022-02-07
Payer: MEDICARE

## 2022-02-07 DIAGNOSIS — I48.91 ATRIAL FIBRILLATION, UNSPECIFIED TYPE (HCC): Primary | ICD-10-CM

## 2022-02-07 LAB
INR BLD: 3.4
PROTIME: 40.9 SECONDS

## 2022-02-07 PROCEDURE — 99212 OFFICE O/P EST SF 10 MIN: CPT

## 2022-02-07 PROCEDURE — 85610 PROTHROMBIN TIME: CPT

## 2022-02-07 NOTE — PROGRESS NOTES
Patient seen in clinic for warfarin management due to atrial fibrillation with an INR goal of 2.0-3.0. Estimated duration of therapy is indefinite. Patient states compliant all of the time with regimen. No bleeding or thromboembolic side effects noted. No significant dietary changes. Patient did take some [employer] brand non-aspirin pain reliever which is likely APAP. No significant recent illness or disease state changes. PT/INR done in office per protocol. INR is 3.4 which is supratherapeutic. Warfarin regimen will be decreased to 1.5mg x 1, then resume previous target of 1.5mg Sun/Wed/Fri and 3mg all other days. Patient states he will do his best to not forget Fridays half dose. Will retest in 3 weeks to assess. Patient understands dosing directions and information discussed. Dosing schedule and follow up appointment given to patient. Progress note routed to referring physicians office. Discussed with patient the Pharmacist Collaborative Practice Agreement. Patient provided verbal and/or electronic (ex. [employer]) consent to participate in the collaborative practice agreement between the pharmacist and referred patient. This is in lieu of paper consent due to COVID-19 precautions and the use of remote/virtual visits.        For Pharmacy Admin Tracking Only     Intervention Detail: Dose Adjustment: 1, reason: Therapy De-escalation   Total # of Interventions Recommended: 1   Total # of Interventions Accepted: 1   Time Spent (min): 15

## 2022-02-15 ENCOUNTER — TELEPHONE (OUTPATIENT)
Dept: PHARMACY | Age: 77
End: 2022-02-15

## 2022-02-15 DIAGNOSIS — I48.91 ATRIAL FIBRILLATION, UNSPECIFIED TYPE (HCC): Primary | ICD-10-CM

## 2022-02-15 NOTE — TELEPHONE ENCOUNTER
Patient called to let us know that he has an eye surgery that was originally scheduled for tomorrow 2/16 that has been moved to 2/17. He states someone from Dr. Bailey Led office called him and told him to hold warfarin for 3 days prior to this procedure so he has been holding warfarin since Saturday. He is concerned since the procedure was moved back 1 day and wants to know if he should have INR tested today or tomorrow. I reassured him that INR is not needed prior to eye surgery unless requested by opthamologist and he can simply resume warfarin Thursday after procedure. We will retest INR on 2/28 as scheduled.

## 2022-02-28 ENCOUNTER — HOSPITAL ENCOUNTER (OUTPATIENT)
Dept: PHARMACY | Age: 77
Setting detail: THERAPIES SERIES
Discharge: HOME | End: 2022-02-28
Payer: MEDICARE

## 2022-02-28 DIAGNOSIS — I48.91 ATRIAL FIBRILLATION, UNSPECIFIED TYPE (HCC): Primary | ICD-10-CM

## 2022-02-28 LAB
INR BLD: 1.9
PROTIME: 23.9 SECONDS

## 2022-02-28 PROCEDURE — 99211 OFF/OP EST MAY X REQ PHY/QHP: CPT

## 2022-02-28 PROCEDURE — 85610 PROTHROMBIN TIME: CPT

## 2022-02-28 NOTE — PROGRESS NOTES
Patient seen in clinic for warfarin management due to atrial fibrillation with an INR goal of 2.0-3.0. Estimated duration of therapy is indefinite. Patient states he has been taking 1.5mg sun/wed and 3mg all other days. He was off warfarin for about 5 days for cataract surgery  and has been back on warfarin for over a week. No bleeding or thromboembolic side effects noted. No significant med or dietary changes. No significant recent illness or disease state changes. PT/INR done in office per protocol. INR is 1.9 which is just below goal.     Warfarin regimen will be continued at current dose of 1.5mg sun/wed and 3mg all other days. Will retest in 3 weeks. Patient understands dosing directions and information discussed. Dosing schedule and follow up appointment given to patient. Progress note routed to referring physicians office. Discussed with patient the Pharmacist Collaborative Practice Agreement. Patient provided verbal and/or electronic (ex. [email or web address]) consent to participate in the collaborative practice agreement between the pharmacist and referred patient. This is in lieu of paper consent due to COVID-19 precautions and the use of remote/virtual visits.        For Pharmacy Admin Tracking Only     Intervention Detail:    Total # of Interventions Recommended: 0   Total # of Interventions Accepted: 0   Time Spent (min): 15

## 2022-03-21 ENCOUNTER — HOSPITAL ENCOUNTER (OUTPATIENT)
Dept: PHARMACY | Age: 77
Setting detail: THERAPIES SERIES
Discharge: HOME | End: 2022-03-21
Payer: MEDICARE

## 2022-03-21 DIAGNOSIS — I48.91 ATRIAL FIBRILLATION, UNSPECIFIED TYPE (HCC): Primary | ICD-10-CM

## 2022-03-21 LAB
INR BLD: 2.9
PROTIME: 35.2 SECONDS

## 2022-03-21 PROCEDURE — 85610 PROTHROMBIN TIME: CPT

## 2022-03-21 PROCEDURE — 99211 OFF/OP EST MAY X REQ PHY/QHP: CPT

## 2022-03-21 NOTE — PROGRESS NOTES
Patient seen in clinic for warfarin management due to atrial fibrillation with an INR goal of 2.0-3.0.  Estimated duration of therapy is indefinite. Patient states compliant all of the time with regimen. No bleeding or thromboembolic side effects noted. No significant med or dietary changes. No significant recent illness or disease state changes. PT/INR done in office per protocol. INR is 2.9 which is therapeutic. Warfarin regimen will be continued at current dose of 1.5mg sun/wed and 3mg all other days. Will retest in 4 weeks. Patient understands dosing directions and information discussed. Dosing schedule and follow up appointment given to patient. Progress note routed to referring physicians office. Discussed with patient the Pharmacist Collaborative Practice Agreement. Patient provided verbal and/or electronic (ex. [email or web address]) consent to participate in the collaborative practice agreement between the pharmacist and referred patient. This is in lieu of paper consent due to COVID-19 precautions and the use of remote/virtual visits.        For Pharmacy Admin Tracking Only     Intervention Detail:    Total # of Interventions Recommended: 0   Total # of Interventions Accepted: 0   Time Spent (min): 15

## 2022-04-19 ENCOUNTER — HOSPITAL ENCOUNTER (OUTPATIENT)
Dept: PHARMACY | Age: 77
Setting detail: THERAPIES SERIES
Discharge: HOME | End: 2022-04-19
Payer: MEDICARE

## 2022-04-19 ENCOUNTER — OFFICE VISIT (OUTPATIENT)
Dept: INTERNAL MEDICINE CLINIC | Age: 77
End: 2022-04-19
Payer: MEDICARE

## 2022-04-19 VITALS
HEART RATE: 58 BPM | RESPIRATION RATE: 16 BRPM | BODY MASS INDEX: 28.06 KG/M2 | OXYGEN SATURATION: 98 % | DIASTOLIC BLOOD PRESSURE: 78 MMHG | WEIGHT: 200.4 LBS | HEIGHT: 71 IN | SYSTOLIC BLOOD PRESSURE: 124 MMHG | TEMPERATURE: 97.3 F

## 2022-04-19 DIAGNOSIS — Z86.73 HISTORY OF CVA (CEREBROVASCULAR ACCIDENT): ICD-10-CM

## 2022-04-19 DIAGNOSIS — I48.91 ATRIAL FIBRILLATION, UNSPECIFIED TYPE (HCC): Primary | ICD-10-CM

## 2022-04-19 DIAGNOSIS — E78.00 HIGH CHOLESTEROL: ICD-10-CM

## 2022-04-19 DIAGNOSIS — I10 ESSENTIAL HYPERTENSION: Primary | ICD-10-CM

## 2022-04-19 DIAGNOSIS — H40.9 GLAUCOMA OF BOTH EYES, UNSPECIFIED GLAUCOMA TYPE: ICD-10-CM

## 2022-04-19 DIAGNOSIS — I48.91 ATRIAL FIBRILLATION, UNSPECIFIED TYPE (HCC): ICD-10-CM

## 2022-04-19 LAB
INR BLD: 2.4
PROTIME: 29.3 SECONDS

## 2022-04-19 PROCEDURE — 99204 OFFICE O/P NEW MOD 45 MIN: CPT | Performed by: INTERNAL MEDICINE

## 2022-04-19 PROCEDURE — 99211 OFF/OP EST MAY X REQ PHY/QHP: CPT

## 2022-04-19 PROCEDURE — 85610 PROTHROMBIN TIME: CPT

## 2022-04-19 RX ORDER — PREDNISOLONE ACETATE 10 MG/ML
1 SUSPENSION/ DROPS OPHTHALMIC 4 TIMES DAILY
COMMUNITY
Start: 2022-03-07

## 2022-04-19 SDOH — ECONOMIC STABILITY: FOOD INSECURITY: WITHIN THE PAST 12 MONTHS, YOU WORRIED THAT YOUR FOOD WOULD RUN OUT BEFORE YOU GOT MONEY TO BUY MORE.: NEVER TRUE

## 2022-04-19 SDOH — ECONOMIC STABILITY: FOOD INSECURITY: WITHIN THE PAST 12 MONTHS, THE FOOD YOU BOUGHT JUST DIDN'T LAST AND YOU DIDN'T HAVE MONEY TO GET MORE.: NEVER TRUE

## 2022-04-19 ASSESSMENT — PATIENT HEALTH QUESTIONNAIRE - PHQ9
SUM OF ALL RESPONSES TO PHQ QUESTIONS 1-9: 0
2. FEELING DOWN, DEPRESSED OR HOPELESS: 0
SUM OF ALL RESPONSES TO PHQ QUESTIONS 1-9: 0
1. LITTLE INTEREST OR PLEASURE IN DOING THINGS: 0
SUM OF ALL RESPONSES TO PHQ9 QUESTIONS 1 & 2: 0

## 2022-04-19 ASSESSMENT — SOCIAL DETERMINANTS OF HEALTH (SDOH): HOW HARD IS IT FOR YOU TO PAY FOR THE VERY BASICS LIKE FOOD, HOUSING, MEDICAL CARE, AND HEATING?: NOT HARD AT ALL

## 2022-04-19 NOTE — PROGRESS NOTES
Kali Rodriguez is a 68 y.o. male who presents for   Chief Complaint   Patient presents with    Established New Doctor     pt states no complaints. needs new doctor.  Health Maintenance     hep c, lip, dep, tdap, awv, pot, creat    and follow up of chronic medical problems. Patient Active Problem List   Diagnosis    Colon polyp    Afib (HCC)     HPI  Here to establish as a new patient and seeing Dr. Shashank Morales in the past and is  for Esteban Pandya and denies any complaints and has a history of for glaucoma hypertension atrial fibrillation and history of CVA and high cholesterol  Patient quit smoking and alcohol about 37 years back    Current Outpatient Medications   Medication Sig Dispense Refill    prednisoLONE acetate (PRED FORTE) 1 % ophthalmic suspension Apply 1 drop to eye 4 times daily      timolol (TIMOPTIC) 0.5 % ophthalmic solution INSTILL 1 DROP INTO EACH EYE IN THE MORNING      famotidine (PEPCID) 20 MG tablet Take 20 mg by mouth every 3 days      losartan (COZAAR) 100 MG tablet TAKE 1 TABLET BY MOUTH ONCE DAILY      SIMBRINZA 1-0.2 % SUSP INSTILL 1 DROP INTO EACH EYE TWICE DAILY      leuprolide (ELIGARD) 7.5 MG KIT SC injection Gets every 6 months.  acetaminophen (TYLENOL) 500 MG tablet Take 500 mg by mouth every 6 hours as needed for Pain      hydrALAZINE (APRESOLINE) 25 MG tablet Take 25 mg by mouth 3 times daily       metoprolol (LOPRESSOR) 50 MG tablet Take 50 mg by mouth 2 times daily.  diltiazem (DILACOR XR) 240 MG XR capsule Take 240 mg by mouth daily.  warfarin (COUMADIN) 3 MG tablet Take 3 mg by mouth daily As directed by [facility]      atorvastatin (LIPITOR) 20 MG tablet Take 20 mg by mouth daily.  Ipratropium-Albuterol (COMBIVENT IN) Inhale 2 puffs into the lungs daily as needed.  aspirin 81 MG tablet Take 81 mg by mouth daily.  allopurinol (ZYLOPRIM) 100 MG tablet Take 100 mg by mouth 2 times daily.       folic acid (FOLVITE) 1 MG tablet Take 1 mg by mouth every other day.  bimatoprost (LUMIGAN) 0.01 % SOLN ophthalmic drops Place 1 drop into both eyes nightly. (Patient not taking: Reported on 4/19/2022)       No current facility-administered medications for this visit. No Known Allergies    Past Medical History:   Diagnosis Date    Atrial fibrillation ([facility] [address] 2005    Cancer [facility]) 2004    prostate    CKD (chronic kidney disease)     Elevated brain natriuretic peptide (BNP) level 01/19/2021    3,303    GERD (gastroesophageal reflux disease)     Glaucoma     Gout     History of colon polyps     Hx of blood clots     leg - patient unsure which leg    Hyperlipidemia     Hypertension     Stroke ([facility] [address]     Unspecified cerebral artery occlusion with cerebral infarction 2005    no deficits    UTI (urinary tract infection)     Warfarin anticoagulation     Wears partial dentures     TOP       Past Surgical History:   Procedure Laterality Date    COLONOSCOPY      States total of 5-6 colonoscopies in the past    COLONOSCOPY N/A 02/08/2018    COLONOSCOPY (PT IN [facility])  WITH COLD BX performed by Jared Sharma MD at [address] COLONOSCOPY N/A 3/22/2021    COLONOSCOPY POLYPECTOMY HOT BIOPSY with application resolution clip performed by Jared Sharma MD at [address] [place] [address].     POLYPECTOMY      benign with colonoscopy    PROSTATECTOMY  2005       Family History   Problem Relation Age of Onset    Stroke Mother     Kidney Disease Father     Diabetes Father     Stroke Sister     Heart Attack Sister 40    Diabetes Brother      ROS   Constitutional:  Negative for fatigue, loss of appetite and unexpected weight change   HEENT            : Negative for neck stiffness and pain, no congestion or sinus pressure   Eyes                : Positive for visual disturbance but no pain   Cardiovascular: No chest pain or palpitations or leg swelling   Respiratory      : Negative for cough, shortness of breath or wheezing   Gastrointestinal: Negative for abdominal pain, constipation or diarrhea and bloating No nausea or vomiting   Genitourinary:     No urgency or frequency, no burning or hematuria   Musculoskeletal: No arthralgias, back pain or myalgias   Skin                  : Negative for rash or erythema   Neurological    : Negative for dizziness, weakness, tremors ,light headedness or syncope   Psychiatric       : Negative for dysphoric mood, sleep disturbances, nervous or anxious, or decreased concentration   All other review of systems was negative    Objective  Physical Examination:    Nursing note reviewed    /78 (Site: Left Upper Arm, Position: Sitting, Cuff Size: Large Adult)   Pulse 58   Temp 97.3 °F (36.3 °C) (Temporal)   Resp 16   Ht 5' 10.98\" (1.803 m)   Wt 200 lb 6.4 oz (90.9 kg)   SpO2 98%   BMI 27.96 kg/m²   BP Readings from Last 3 Encounters:   04/19/22 124/78   03/22/21 93/63   03/22/21 126/78         Constitutional:  Agnes Horton is oriented to place, person and time ,appears well-developed and well-nourished  HEENT:  Atraumatic and normocephalic, external ears normal bilaterally, nose normal no oropharyngeal exudate and is clear and moist  Eyes:  EOCM normal; conjunctivae normal; PERRLA bilaterally  Neck:  Normal range of motion, neck supple, no JVD and no thyromegaly  Cardiovascular:  RRR, normal heart sounds and intact distal pulses  Pulmonary:  effort normal and breath sounds normal bilaterally,no wheezes or rales, no respiratory distress  Abdominal:  Soft, non-tender; normal bowel sounds, no masses  Musculoskeletal:  Normal range of motion and no edema or tenderness bilaterally  No lymphadenopathy  Neurological:  alert, oriented, and normal reflexes bilaterally  Skin: warm and dry  Psychiatric:  normal mood and effect; behavior normal.    Labs:   No results found for: LABA1C  No results found for: CHOL  No results found for: HDL  No results found for: LDLCALC  No results found for: TRIG  No components found for: CHOLHDL  Lab Results   Component Value Date    WBC 9.3 01/19/2021    HGB 13.8 01/19/2021    HCT 42.9 01/19/2021    MCV 93.1 01/19/2021     01/19/2021     Lab Results   Component Value Date    INR 2.4 04/19/2022    PROTIME 29.3 04/19/2022     Lab Results   Component Value Date    GLUCOSE 92 01/19/2021    CREATININE 1.64 (H) 01/19/2021    BUN 14 01/19/2021     01/19/2021    K 3.8 01/19/2021     01/19/2021    CO2 20 01/19/2021     Lab Results   Component Value Date    ALT 22 01/19/2021    AST 28 01/19/2021    ALKPHOS 54 01/19/2021    BILITOT 0.52 01/19/2021     Lab Results   Component Value Date    LABALBU 4.2 01/19/2021     No results found for: TSH, CBC  Assessment:  1. Essential hypertension    2. Atrial fibrillation, unspecified type ([address]    3. High cholesterol    4. Glaucoma of both eyes, unspecified glaucoma type    5. History of CVA (cerebrovascular accident)        Plan:  Patient blood pressure is stable and is on multiple medications  Patient had CVA in 2005 when he found to have atrial fibrillation and patient was since then anticoagulated and initially saw Dr. Rudy Fuentes cardiology and after he left the town he started seeing Dr. Mary Mcdaniel for cholesterol and will check the previous records from the previous PCP and if no recent lab work will order the cholesterol check  Patient's visit to the eye doctor regarding his cataracts and bilateral glaucoma and patient is stable at this time and using the eyedrops  Patient is using leuprolide for his history of prostate carcinoma  Counseled about diet exercise   Review in 3 months           1. Gagandeep Washburn received counseling on the following healthy behaviors: nutrition and exercise    2. Prior labs and health maintenance reviewed. 3.  Discussed use, benefit, and side effects of prescribed medications. Barriers to medication compliance addressed. All his questions were answered.   Pt voiced understanding. Agustin Jones will continue current medications, diet and exercise. No orders of the defined types were placed in this encounter. Completed Refills               Requested Prescriptions      No prescriptions requested or ordered in this encounter     4. Patient given educational materials - see patient instructions    5. Was a self-tracking handout given in paper form or via [employer]? NO    No orders of the defined types were placed in this encounter. Return in about 3 months (around 7/19/2022). Patient voiced understanding and agreed to treatment plan. Electronically signed by Santiago Corona MD on 4/19/2022 at 3:19 PM    This note is created with a voice recognition program and while intend to generate a document that accurately reflects the content of the visit, no guarantee can be provided that every mistake has been identified and corrected by editing.

## 2022-04-19 NOTE — PROGRESS NOTES
Patient seen in clinic for warfarin management due to atrial fibrillation with an INR goal of 2.0-3.0. Estimated duration of therapy is indefinite. Patient states compliant most of the time with regimen. Patient states he had a surgery on his eye about 2 weeks ago and was told to hold warfarin by his physician and surgeon for 4 days. After surgery, he resumed his normal dose. Advised patient to let us know about any upcoming surgeries/procedures in the future. No bleeding or thromboembolic side effects noted. No significant med or dietary changes. No significant recent illness or disease state changes. PT/INR done in office per protocol. INR is 2.4 which is therapeutic. Warfarin regimen will be continued at current dose of 1.5mg Sun Wed and 3mg on all other days. Will retest in 4 weeks. Patient understands dosing directions and information discussed. Dosing schedule and follow up appointment given to patient. Progress note routed to referring physicians office. Discussed with patient the Pharmacist Collaborative Practice Agreement. Patient provided verbal and/or electronic (ex. [employer]hart) consent to participate in the collaborative practice agreement between the pharmacist and referred patient. This is in lieu of paper consent due to COVID-19 precautions and the use of remote/virtual visits.        For Pharmacy Admin Tracking Only     Intervention Detail:    Total # of Interventions Recommended: 0   Total # of Interventions Accepted: 0   Time Spent (min): 15

## 2022-04-21 ENCOUNTER — TELEPHONE (OUTPATIENT)
Dept: INTERNAL MEDICINE CLINIC | Age: 77
End: 2022-04-21

## 2022-04-21 NOTE — TELEPHONE ENCOUNTER
Called Dr. Dimple Fontaine office for reports. Maryjane at that office states that all of his records are paper and they are very hard to read. Is asking if you still want them?     Please advise

## 2022-05-04 NOTE — TELEPHONE ENCOUNTER
DR. Clinton Saavedra office stating that they need a signed release, patient has been informed and says he will go to their office to sign the release

## 2022-05-05 NOTE — TELEPHONE ENCOUNTER
Patient went to Dr. Nicole Gonzalez office to sign a release there office was closed.  Patient leaving for vacation but will try again when he returns

## 2022-05-17 ENCOUNTER — HOSPITAL ENCOUNTER (OUTPATIENT)
Dept: PHARMACY | Age: 77
Setting detail: THERAPIES SERIES
Discharge: HOME | End: 2022-05-17
Payer: MEDICARE

## 2022-05-17 DIAGNOSIS — I48.91 ATRIAL FIBRILLATION, UNSPECIFIED TYPE (HCC): Primary | ICD-10-CM

## 2022-05-17 LAB
INR BLD: 3.2
PROTIME: 38.8 SECONDS

## 2022-05-17 PROCEDURE — 99211 OFF/OP EST MAY X REQ PHY/QHP: CPT

## 2022-05-17 PROCEDURE — 85610 PROTHROMBIN TIME: CPT

## 2022-05-17 NOTE — PROGRESS NOTES
Patient seen in clinic for warfarin management due to atrial fibrillation with an INR goal of 2.0-3.0. Estimated duration of therapy is indefinite. Patient states compliant all of the time with regimen. No bleeding or thromboembolic side effects noted. No significant med changes. No significant recent illness or disease state changes. Patient states he may be eating less veggies as his wife is out of town and normally she cooks. PT/INR done in office per protocol. INR is 3.2 which is just above goal.     Warfarin regimen will be continued at current dose of 1.5mg sun/wed and 3mg all other days. Will retest in 4 weeks. Patient understands dosing directions and information discussed. Dosing schedule and follow up appointment given to patient. Progress note routed to referring physicians office. Discussed with patient the Pharmacist Collaborative Practice Agreement. Patient provided verbal and/or electronic (ex. [employer]) consent to participate in the collaborative practice agreement between the pharmacist and referred patient. This is in lieu of paper consent due to COVID-19 precautions and the use of remote/virtual visits.        For Pharmacy Admin Tracking Only     Intervention Detail:    Total # of Interventions Recommended: 0   Total # of Interventions Accepted: 0   Time Spent (min): 15

## 2022-06-01 NOTE — TELEPHONE ENCOUNTER
Patient called, states talked to Dr Calderón Byron office and they stated we just have to fax a request to [phone]

## 2022-06-14 ENCOUNTER — HOSPITAL ENCOUNTER (OUTPATIENT)
Dept: PHARMACY | Age: 77
Setting detail: THERAPIES SERIES
Discharge: HOME | End: 2022-06-14
Payer: MEDICARE

## 2022-06-14 DIAGNOSIS — I48.91 ATRIAL FIBRILLATION, UNSPECIFIED TYPE (HCC): Primary | ICD-10-CM

## 2022-06-14 LAB
INR BLD: 3.3
PROTIME: 39.8 SECONDS

## 2022-06-14 PROCEDURE — 85610 PROTHROMBIN TIME: CPT

## 2022-06-14 PROCEDURE — 99212 OFFICE O/P EST SF 10 MIN: CPT

## 2022-06-14 NOTE — PROGRESS NOTES
Patient seen in clinic for warfarin management due to atrial fibrillation with an INR goal of 2.0-3.0.  Estimated duration of therapy is indefinite. Patient states compliant all of the time with regimen. No bleeding or thromboembolic side effects noted. No significant med or dietary changes. No significant recent illness or disease state changes. PT/INR done in office per protocol. INR is 3.3 which is supratherapeutic. Warfarin regimen will be decreased to 1.5mg sun/wed/fri and 3mg all other days. Will retest in 2 weeks. Patient understands dosing directions and information discussed. Dosing schedule and follow up appointment given to patient. Progress note routed to referring physicians office. Discussed with patient the Pharmacist Collaborative Practice Agreement. Patient provided verbal and/or electronic (ex. [employer]) consent to participate in the collaborative practice agreement between the pharmacist and referred patient. This is in lieu of paper consent due to COVID-19 precautions and the use of remote/virtual visits.        For Pharmacy Admin Tracking Only     Intervention Detail: Dose Adjustment: 1, reason: Therapy De-escalation   Total # of Interventions Recommended: 1   Total # of Interventions Accepted: 1   Time Spent (min): 15

## 2022-06-28 ENCOUNTER — HOSPITAL ENCOUNTER (OUTPATIENT)
Dept: PHARMACY | Age: 77
Setting detail: THERAPIES SERIES
Discharge: HOME | End: 2022-06-28
Payer: MEDICARE

## 2022-06-28 DIAGNOSIS — I48.91 ATRIAL FIBRILLATION, UNSPECIFIED TYPE (HCC): Primary | ICD-10-CM

## 2022-06-28 LAB
INR BLD: 2.7
PROTIME: 32.7 SECONDS

## 2022-06-28 PROCEDURE — 85610 PROTHROMBIN TIME: CPT

## 2022-06-28 PROCEDURE — 99211 OFF/OP EST MAY X REQ PHY/QHP: CPT

## 2022-06-28 NOTE — PROGRESS NOTES
Patient seen in clinic for warfarin management due to atrial fibrillation with an INR goal of 2.0-3.0. Estimated duration of therapy is indefinite. Patient states compliant all of the time with regimen. No bleeding or thromboembolic side effects noted. No significant med or dietary changes. No significant recent illness or disease state changes. PT/INR done in office per protocol. INR is 2.7 which is therapeutic. Warfarin regimen will be continued at current dose of 1.5mg Sun/Wed/Fri and 3mg all other days. Will retest in 4 weeks. Patient understands dosing directions and information discussed. Dosing schedule and follow up appointment given to patient. Progress note routed to referring physicians office. Discussed with patient the Pharmacist Collaborative Practice Agreement. Patient provided verbal and/or electronic (ex. [employer]) consent to participate in the collaborative practice agreement between the pharmacist and referred patient. This is in lieu of paper consent due to COVID-19 precautions and the use of remote/virtual visits.        For Pharmacy Admin Tracking Only     Intervention Detail:    Total # of Interventions Recommended: 0   Total # of Interventions Accepted: 0   Time Spent (min): 15

## 2022-07-18 ENCOUNTER — OFFICE VISIT (OUTPATIENT)
Dept: INTERNAL MEDICINE CLINIC | Age: 77
End: 2022-07-18
Payer: MEDICARE

## 2022-07-18 VITALS
RESPIRATION RATE: 16 BRPM | WEIGHT: 198.4 LBS | HEIGHT: 71 IN | TEMPERATURE: 96.8 F | OXYGEN SATURATION: 100 % | DIASTOLIC BLOOD PRESSURE: 78 MMHG | SYSTOLIC BLOOD PRESSURE: 138 MMHG | BODY MASS INDEX: 27.77 KG/M2 | HEART RATE: 61 BPM

## 2022-07-18 DIAGNOSIS — I10 ESSENTIAL HYPERTENSION: Primary | ICD-10-CM

## 2022-07-18 DIAGNOSIS — I48.91 ATRIAL FIBRILLATION, UNSPECIFIED TYPE (HCC): ICD-10-CM

## 2022-07-18 DIAGNOSIS — H61.23 BILATERAL IMPACTED CERUMEN: ICD-10-CM

## 2022-07-18 DIAGNOSIS — E78.00 HIGH CHOLESTEROL: ICD-10-CM

## 2022-07-18 PROCEDURE — 99214 OFFICE O/P EST MOD 30 MIN: CPT | Performed by: INTERNAL MEDICINE

## 2022-07-18 PROCEDURE — 1123F ACP DISCUSS/DSCN MKR DOCD: CPT | Performed by: INTERNAL MEDICINE

## 2022-07-18 RX ORDER — HYDRALAZINE HYDROCHLORIDE 25 MG/1
25 TABLET, FILM COATED ORAL 3 TIMES DAILY
Qty: 270 TABLET | Refills: 0 | Status: SHIPPED | OUTPATIENT
Start: 2022-07-18

## 2022-07-18 RX ORDER — DILTIAZEM HYDROCHLORIDE 240 MG/1
240 CAPSULE, EXTENDED RELEASE ORAL DAILY
Qty: 90 CAPSULE | Refills: 0 | Status: SHIPPED | OUTPATIENT
Start: 2022-07-18

## 2022-07-18 RX ORDER — METOPROLOL TARTRATE 50 MG/1
50 TABLET, FILM COATED ORAL 2 TIMES DAILY
Qty: 180 TABLET | Refills: 0 | Status: SHIPPED | OUTPATIENT
Start: 2022-07-18

## 2022-07-18 RX ORDER — LOSARTAN POTASSIUM 100 MG/1
TABLET ORAL
Qty: 90 TABLET | Refills: 0 | Status: SHIPPED | OUTPATIENT
Start: 2022-07-18

## 2022-07-18 RX ORDER — ALLOPURINOL 100 MG/1
100 TABLET ORAL 2 TIMES DAILY
Qty: 30 TABLET | Refills: 2 | Status: SHIPPED | OUTPATIENT
Start: 2022-07-18

## 2022-07-18 RX ORDER — ATORVASTATIN CALCIUM 20 MG/1
20 TABLET, FILM COATED ORAL DAILY
Qty: 90 TABLET | Refills: 0 | Status: SHIPPED | OUTPATIENT
Start: 2022-07-18

## 2022-07-18 ASSESSMENT — PATIENT HEALTH QUESTIONNAIRE - PHQ9
SUM OF ALL RESPONSES TO PHQ QUESTIONS 1-9: 0
SUM OF ALL RESPONSES TO PHQ QUESTIONS 1-9: 0
1. LITTLE INTEREST OR PLEASURE IN DOING THINGS: 0
SUM OF ALL RESPONSES TO PHQ QUESTIONS 1-9: 0
SUM OF ALL RESPONSES TO PHQ9 QUESTIONS 1 & 2: 0
SUM OF ALL RESPONSES TO PHQ QUESTIONS 1-9: 0
2. FEELING DOWN, DEPRESSED OR HOPELESS: 0

## 2022-07-18 NOTE — PROGRESS NOTES
Faustina Jha is a 68 y.o. male who presents for   Chief Complaint   Patient presents with    Hypertension     3 month f/u; would like to get ears checked      Health Maintenance     Awv, lipids, hep c, tdap, covid    Discuss Medications     Combivent inhaler     and follow up of chronic medical problems. Patient Active Problem List   Diagnosis    Colon polyp    Afib (HCC)     HPI  Here for follow-up on blood pressure and cholesterol denies any new complaints    Current Outpatient Medications   Medication Sig Dispense Refill    allopurinol (ZYLOPRIM) 100 MG tablet Take 1 tablet by mouth in the morning and 1 tablet before bedtime. 30 tablet 2    atorvastatin (LIPITOR) 20 MG tablet Take 1 tablet by mouth in the morning. 90 tablet 0    dilTIAZem (DILACOR XR) 240 MG extended release capsule Take 1 capsule by mouth in the morning. 90 capsule 0    hydrALAZINE (APRESOLINE) 25 MG tablet Take 1 tablet by mouth in the morning and 1 tablet at noon and 1 tablet before bedtime. 270 tablet 0    losartan (COZAAR) 100 MG tablet TAKE 1 TABLET BY MOUTH ONCE DAILY 90 tablet 0    metoprolol tartrate (LOPRESSOR) 50 MG tablet Take 1 tablet by mouth in the morning and 1 tablet before bedtime. 180 tablet 0    carbamide peroxide (DEBROX) 6.5 % otic solution Place 5 drops in ear(s) in the morning and 5 drops before bedtime. 1 each 0    prednisoLONE acetate (PRED FORTE) 1 % ophthalmic suspension Apply 1 drop to eye 4 times daily      timolol (TIMOPTIC) 0.5 % ophthalmic solution INSTILL 1 DROP INTO EACH EYE IN THE MORNING      famotidine (PEPCID) 20 MG tablet Take 20 mg by mouth every 3 days      SIMBRINZA 1-0.2 % SUSP INSTILL 1 DROP INTO EACH EYE TWICE DAILY      leuprolide (ELIGARD) 7.5 MG KIT SC injection Gets every 6 months.       acetaminophen (TYLENOL) 500 MG tablet Take 500 mg by mouth every 6 hours as needed for Pain      warfarin (COUMADIN) 3 MG tablet Take 3 mg by mouth daily As directed by [address] [place] [address] Ipratropium-Albuterol (COMBIVENT IN) Inhale 2 puffs into the lungs daily as needed. bimatoprost (LUMIGAN) 0.01 % SOLN ophthalmic drops Place 1 drop into both eyes nightly      aspirin 81 MG tablet Take 81 mg by mouth daily. folic acid (FOLVITE) 1 MG tablet Take 1 mg by mouth every other day. No current facility-administered medications for this visit. No Known Allergies    Past Medical History:   Diagnosis Date    Atrial fibrillation ([address] 2005    Cancer [facility]) 2004    prostate    CKD (chronic kidney disease)     Elevated brain natriuretic peptide (BNP) level 01/19/2021    3,303    GERD (gastroesophageal reflux disease)     Glaucoma     Gout     History of colon polyps     Hx of blood clots     leg - patient unsure which leg    Hyperlipidemia     Hypertension     Stroke [facility])     Unspecified cerebral artery occlusion with cerebral infarction 2005    no deficits    UTI (urinary tract infection)     Warfarin anticoagulation     Wears partial dentures     TOP       Past Surgical History:   Procedure Laterality Date    COLONOSCOPY      States total of 5-6 colonoscopies in the past    COLONOSCOPY N/A 02/08/2018    COLONOSCOPY (PT IN [facility])  WITH COLD BX performed by Blossom Egan MD at [facility] N/A 3/22/2021    COLONOSCOPY POLYPECTOMY HOT BIOPSY with application resolution clip performed by Blossom Egan MD at [address] [facility].     POLYPECTOMY      benign with colonoscopy    PROSTATECTOMY  2005       Family History   Problem Relation Age of Onset    Stroke Mother     Kidney Disease Father     Diabetes Father     Stroke Sister     Heart Attack Sister 40    Diabetes Brother      ROS  Constitutional:  Negative for fatigue, loss of appetite and unexpected weight change  HEENT            : Negative for neck stiffness and pain, no congestion or sinus pressure  Eyes                : No visual disturbance or pain  Cardiovascular: No chest pain or palpitations or leg swelling  Respiratory      : Negative for cough, shortness of breath or wheezing  Gastrointestinal: Negative for abdominal pain, constipation or diarrhea and bloating No nausea or vomiting  Genitourinary:     No urgency or frequency, no burning or hematuria  Musculoskeletal: No arthralgias, back pain or myalgias  Skin                  : Negative for rash or erythema  Neurological    : Negative for dizziness, weakness, tremors ,light headedness or syncope  Psychiatric       : Negative for dysphoric mood, sleep disturbances, nervous or anxious, or decreased concentration  All other review of systems was negative    Objective  Physical Examination:    Nursing note reviewed    /78 (Site: Left Upper Arm, Position: Sitting, Cuff Size: Medium Adult)   Pulse 61   Temp 96.8 °F (36 °C) (Temporal)   Resp 16   Ht 5' 10.98\" (1.803 m)   Wt 198 lb 6.4 oz (90 kg)   SpO2 100%   BMI 27.68 kg/m²   BP Readings from Last 3 Encounters:   07/18/22 138/78   04/19/22 124/78   03/22/21 93/63         Constitutional:  Blaine Wise is oriented to place, person and time ,appears well-developed and well-nourished  HEENT:  Atraumatic and normocephalic, external ears normal bilaterally, nose normal no oropharyngeal exudate and is clear and moist  Eyes:  EOCM normal; conjunctivae normal; PERRLA bilaterally  Neck:  Normal range of motion, neck supple, no JVD and no thyromegaly  Cardiovascular:  RRR, normal heart sounds and intact distal pulses  Pulmonary:  effort normal and breath sounds normal bilaterally,no wheezes or rales, no respiratory distress  Abdominal:  Soft, non-tender; normal bowel sounds, no masses  Musculoskeletal:  Normal range of motion and no edema or tenderness bilaterally  No lymphadenopathy  Neurological:  alert, oriented, and normal reflexes bilaterally  Skin: warm and dry  Psychiatric:  normal mood and effect; behavior normal.    Labs:   No results found for: LABA1C  No results found for: CHOL  No results found for: HDL  No results found for: LDLCALC  No results found for: TRIG  No results found for: CHOLHDL  Lab Results   Component Value Date    WBC 9.3 01/19/2021    HGB 13.8 01/19/2021    HCT 42.9 01/19/2021    MCV 93.1 01/19/2021     01/19/2021     Lab Results   Component Value Date    INR 2.7 06/28/2022    PROTIME 32.7 06/28/2022     Lab Results   Component Value Date    GLUCOSE 92 01/19/2021    CREATININE 1.64 (H) 01/19/2021    BUN 14 01/19/2021     01/19/2021    K 3.8 01/19/2021     01/19/2021    CO2 20 01/19/2021     Lab Results   Component Value Date    ALT 22 01/19/2021    AST 28 01/19/2021    ALKPHOS 54 01/19/2021    BILITOT 0.52 01/19/2021     Lab Results   Component Value Date    LABALBU 4.2 01/19/2021     No results found for: TSH, CBC  Assessment:  1. Essential hypertension    2. Atrial fibrillation, unspecified type ([address]    3. High cholesterol    4. Bilateral impacted cerumen        Plan:  Patient blood pressure is stable and continue current treatment and patient had questions about his medications hydralazine and metoprolol because of an article in the [address] and patient advised to continue treatment and reassurance given and explained the importance and functions of the medication   Patient following with cardiology tomorrow patient's heart rate is stable and currently in sinus rhythm and patient is on anticoagulation and being monitored by cardiology patient's cholesterol profile is stable and is on atorvastatin and new lab work ordered  Advised patient to use Debrox eardrops and come back next week for ear cleaning  Review in 1 week           1. Anh Mares received counseling on the following healthy behaviors: nutrition and exercise    2. Prior labs and health maintenance reviewed. 3.  Discussed use, benefit, and side effects of prescribed medications. Barriers to medication compliance addressed. All his questions were answered. Pt voiced understanding.    Anh Mares will continue current medications, diet and exercise. Orders Placed This Encounter   Medications    allopurinol (ZYLOPRIM) 100 MG tablet     Sig: Take 1 tablet by mouth in the morning and 1 tablet before bedtime. Dispense:  30 tablet     Refill:  2    atorvastatin (LIPITOR) 20 MG tablet     Sig: Take 1 tablet by mouth in the morning. Dispense:  90 tablet     Refill:  0    dilTIAZem (DILACOR XR) 240 MG extended release capsule     Sig: Take 1 capsule by mouth in the morning. Dispense:  90 capsule     Refill:  0    hydrALAZINE (APRESOLINE) 25 MG tablet     Sig: Take 1 tablet by mouth in the morning and 1 tablet at noon and 1 tablet before bedtime. Dispense:  270 tablet     Refill:  0    losartan (COZAAR) 100 MG tablet     Sig: TAKE 1 TABLET BY MOUTH ONCE DAILY     Dispense:  90 tablet     Refill:  0    metoprolol tartrate (LOPRESSOR) 50 MG tablet     Sig: Take 1 tablet by mouth in the morning and 1 tablet before bedtime. Dispense:  180 tablet     Refill:  0    carbamide peroxide (DEBROX) 6.5 % otic solution     Sig: Place 5 drops in ear(s) in the morning and 5 drops before bedtime. Dispense:  1 each     Refill:  0          Completed Refills               Requested Prescriptions     Signed Prescriptions Disp Refills    allopurinol (ZYLOPRIM) 100 MG tablet 30 tablet 2     Sig: Take 1 tablet by mouth in the morning and 1 tablet before bedtime. atorvastatin (LIPITOR) 20 MG tablet 90 tablet 0     Sig: Take 1 tablet by mouth in the morning. dilTIAZem (DILACOR XR) 240 MG extended release capsule 90 capsule 0     Sig: Take 1 capsule by mouth in the morning. hydrALAZINE (APRESOLINE) 25 MG tablet 270 tablet 0     Sig: Take 1 tablet by mouth in the morning and 1 tablet at noon and 1 tablet before bedtime.     losartan (COZAAR) 100 MG tablet 90 tablet 0     Sig: TAKE 1 TABLET BY MOUTH ONCE DAILY    metoprolol tartrate (LOPRESSOR) 50 MG tablet 180 tablet 0     Sig: Take 1 tablet by mouth in the morning and 1 tablet before bedtime. carbamide peroxide (DEBROX) 6.5 % otic solution 1 each 0     Sig: Place 5 drops in ear(s) in the morning and 5 drops before bedtime. 4. Patient given educational materials - see patient instructions    5. Was a self-tracking handout given in paper form or via [employer]? NO    Orders Placed This Encounter   Procedures    Comprehensive Metabolic Panel     Standing Status:   Future     Standing Expiration Date:   7/18/2023    Magnesium     Standing Status:   Future     Standing Expiration Date:   7/18/2023    CBC     Standing Status:   Future     Standing Expiration Date:   7/18/2023    Vitamin B12     Standing Status:   Future     Standing Expiration Date:   7/18/2023    TSH     Standing Status:   Future     Standing Expiration Date:   7/18/2023    Lipid Panel     Standing Status:   Future     Standing Expiration Date:   7/18/2023     Order Specific Question:   Is Patient Fasting?/# of Hours     Answer:   12    Uric Acid     Standing Status:   Future     Standing Expiration Date:   7/18/2023    CK     Standing Status:   Future     Standing Expiration Date:   7/18/2023     No follow-ups on file. Patient voiced understanding and agreed to treatment plan. Electronically signed by Liss Hoyos MD on 7/18/2022 at 3:27 PM    This note is created with a voice recognition program and while intend to generate a document that accurately reflects the content of the visit, no guarantee can be provided that every mistake has been identified and corrected by editing.

## 2022-07-20 LAB
ALBUMIN SERPL-MCNC: 4.5 G/DL
ALP BLD-CCNC: 52 U/L
ALT SERPL-CCNC: 16 U/L
ANION GAP SERPL CALCULATED.3IONS-SCNC: ABNORMAL MMOL/L
AST SERPL-CCNC: 25 U/L
BASOPHILS ABSOLUTE: NORMAL
BASOPHILS RELATIVE PERCENT: NORMAL
BILIRUB SERPL-MCNC: 0.6 MG/DL (ref 0.1–1.4)
BUN BLDV-MCNC: 15 MG/DL
CALCIUM SERPL-MCNC: 9.5 MG/DL
CHLORIDE BLD-SCNC: 106 MMOL/L
CHOLESTEROL, TOTAL: 157 MG/DL
CHOLESTEROL/HDL RATIO: 3.6
CO2: 26 MMOL/L
CREAT SERPL-MCNC: 1.5 MG/DL
EOSINOPHILS ABSOLUTE: NORMAL
EOSINOPHILS RELATIVE PERCENT: NORMAL
GFR CALCULATED: ABNORMAL
GLUCOSE BLD-MCNC: 104 MG/DL
HCT VFR BLD CALC: NORMAL %
HDLC SERPL-MCNC: 44 MG/DL (ref 35–70)
HEMOGLOBIN: NORMAL
LDL CHOLESTEROL CALCULATED: 76 MG/DL (ref 0–160)
LYMPHOCYTES ABSOLUTE: NORMAL
LYMPHOCYTES RELATIVE PERCENT: NORMAL
MAGNESIUM: NORMAL
MCH RBC QN AUTO: NORMAL PG
MCHC RBC AUTO-ENTMCNC: NORMAL G/DL
MCV RBC AUTO: NORMAL FL
MONOCYTES ABSOLUTE: NORMAL
MONOCYTES RELATIVE PERCENT: NORMAL
NEUTROPHILS ABSOLUTE: NORMAL
NEUTROPHILS RELATIVE PERCENT: NORMAL
NONHDLC SERPL-MCNC: ABNORMAL MG/DL
PLATELET # BLD: NORMAL 10*3/UL
PMV BLD AUTO: NORMAL FL
POTASSIUM SERPL-SCNC: 3.9 MMOL/L
RBC # BLD: NORMAL 10*6/UL
SODIUM BLD-SCNC: 140 MMOL/L
TOTAL CK: 195 U/L
TOTAL PROTEIN: 7
TRIGL SERPL-MCNC: 184 MG/DL
TSH SERPL DL<=0.05 MIU/L-ACNC: NORMAL M[IU]/L
URIC ACID: NORMAL
VITAMIN B-12: NORMAL
VLDLC SERPL CALC-MCNC: 37 MG/DL
WBC # BLD: NORMAL 10*3/UL

## 2022-07-21 DIAGNOSIS — E78.00 HIGH CHOLESTEROL: ICD-10-CM

## 2022-07-21 DIAGNOSIS — I10 ESSENTIAL HYPERTENSION: ICD-10-CM

## 2022-07-21 DIAGNOSIS — I48.91 ATRIAL FIBRILLATION, UNSPECIFIED TYPE (HCC): ICD-10-CM

## 2022-07-26 ENCOUNTER — HOSPITAL ENCOUNTER (OUTPATIENT)
Dept: PHARMACY | Age: 77
Setting detail: THERAPIES SERIES
Discharge: HOME | End: 2022-07-26
Payer: MEDICARE

## 2022-07-26 DIAGNOSIS — I48.91 ATRIAL FIBRILLATION, UNSPECIFIED TYPE (HCC): Primary | ICD-10-CM

## 2022-07-26 LAB
INR BLD: 2.5
PROTIME: 29.9 SECONDS

## 2022-07-26 PROCEDURE — 99211 OFF/OP EST MAY X REQ PHY/QHP: CPT

## 2022-07-26 PROCEDURE — 85610 PROTHROMBIN TIME: CPT

## 2022-07-26 NOTE — PROGRESS NOTES
Patient seen in clinic for warfarin management due to atrial fibrillation with an INR goal of 2.0-3.0. Estimated duration of therapy is indefinite. Patient states compliant all of the time with regimen. No bleeding or thromboembolic side effects noted. No significant med or dietary changes. No significant recent illness or disease state changes. PT/INR done in office per protocol. INR is 2.5 which is therapeutic. Warfarin regimen will be continued at current dose of 1.5mg sun/wed/fri and 3mg all other days. Will retest in 4 weeks. Patient understands dosing directions and information discussed. Dosing schedule and follow up appointment given to patient. Progress note routed to referring physicians office. Discussed with patient the Pharmacist Collaborative Practice Agreement. Patient provided verbal and/or electronic (ex. [employer]) consent to participate in the collaborative practice agreement between the pharmacist and referred patient. This is in lieu of paper consent due to COVID-19 precautions and the use of remote/virtual visits.        For Pharmacy Admin Tracking Only    Intervention Detail:   Total # of Interventions Recommended: 0  Total # of Interventions Accepted: 0  Time Spent (min): 15

## 2022-07-29 ENCOUNTER — TELEPHONE (OUTPATIENT)
Dept: INTERNAL MEDICINE CLINIC | Age: 77
End: 2022-07-29

## 2022-07-29 DIAGNOSIS — H91.90 HEARING DIFFICULTY, UNSPECIFIED LATERALITY: ICD-10-CM

## 2022-07-29 DIAGNOSIS — H61.23 BILATERAL IMPACTED CERUMEN: Primary | ICD-10-CM

## 2022-07-29 NOTE — TELEPHONE ENCOUNTER
----- Message from Megan Valadez sent at 7/28/2022  2:10 PM EDT -----  Subject: Message to Provider    QUESTIONS  Information for Provider? Pt would like to know the status of the ENT   provider that was suggested by provider. Please call pt back in regards to   this request.   ---------------------------------------------------------------------------  --------------  Katy Maloney INFO  [phone]; OK to leave message on voicemail  ---------------------------------------------------------------------------  --------------  SCRIPT ANSWERS  Relationship to Patient?  Self

## 2022-08-23 ENCOUNTER — HOSPITAL ENCOUNTER (OUTPATIENT)
Dept: PHARMACY | Age: 77
Setting detail: THERAPIES SERIES
Discharge: HOME | End: 2022-08-23
Payer: MEDICARE

## 2022-08-23 DIAGNOSIS — I48.91 ATRIAL FIBRILLATION, UNSPECIFIED TYPE (HCC): Primary | ICD-10-CM

## 2022-08-23 PROCEDURE — 85610 PROTHROMBIN TIME: CPT

## 2022-08-23 PROCEDURE — 99211 OFF/OP EST MAY X REQ PHY/QHP: CPT

## 2022-09-20 ENCOUNTER — HOSPITAL ENCOUNTER (OUTPATIENT)
Dept: PHARMACY | Age: 77
Setting detail: THERAPIES SERIES
Discharge: HOME | End: 2022-09-20
Payer: MEDICARE

## 2022-09-20 DIAGNOSIS — I48.91 ATRIAL FIBRILLATION, UNSPECIFIED TYPE (HCC): Primary | ICD-10-CM

## 2022-09-20 LAB
INR BLD: 2.9
PROTIME: 34.9 SECONDS

## 2022-09-20 PROCEDURE — 99211 OFF/OP EST MAY X REQ PHY/QHP: CPT

## 2022-09-20 PROCEDURE — 85610 PROTHROMBIN TIME: CPT

## 2022-09-20 NOTE — PROGRESS NOTES
Patient seen in clinic for warfarin management due to atrial fibrillation with an INR goal of 2.0-3.0. Estimated duration of therapy is indefinite. Patient states compliant all of the time with regimen. No bleeding or thromboembolic side effects noted. No significant med or dietary changes. No significant recent illness or disease state changes. PT/INR done in office per protocol. INR is 2.9 which is therapeutic. Warfarin regimen will be continued at current dose of 1.5mg sun/wed/fri and 3mg all other days. Will retest in 5 weeks. Patient understands dosing directions and information discussed. Dosing schedule and follow up appointment given to patient. Progress note routed to referring physicians office. Discussed with patient the Pharmacist Collaborative Practice Agreement. Patient provided verbal and/or electronic (ex. [employer]) consent to participate in the collaborative practice agreement between the pharmacist and referred patient. This is in lieu of paper consent due to COVID-19 precautions and the use of remote/virtual visits.        For Pharmacy Admin Tracking Only    Intervention Detail:   Total # of Interventions Recommended: 0  Total # of Interventions Accepted: 0  Time Spent (min): 15

## 2022-10-25 ENCOUNTER — HOSPITAL ENCOUNTER (OUTPATIENT)
Dept: PHARMACY | Age: 77
Setting detail: THERAPIES SERIES
Discharge: HOME | End: 2022-10-25
Payer: MEDICARE

## 2022-10-25 DIAGNOSIS — I48.91 ATRIAL FIBRILLATION, UNSPECIFIED TYPE (HCC): Primary | ICD-10-CM

## 2022-10-25 LAB
INR BLD: 2.2
PROTIME: 26.9 SECONDS

## 2022-10-25 PROCEDURE — 99211 OFF/OP EST MAY X REQ PHY/QHP: CPT

## 2022-10-25 PROCEDURE — 85610 PROTHROMBIN TIME: CPT

## 2022-10-25 NOTE — PROGRESS NOTES
Patient seen in clinic for warfarin management due to atrial fibrillation with an INR goal of 2.0-3.0. Estimated duration of therapy is indefinite. Patient states compliant all of the time with regimen. No bleeding or thromboembolic side effects noted. No significant med or dietary changes. No significant recent illness or disease state changes. PT/INR done in office per protocol. INR is 2.2 which is therapeutic. Warfarin regimen will be continued at current dose 1.5mg Sun/Wed/Fri, 3mg all other days. Will retest in 5 weeks. Patient understands dosing directions and information discussed. Dosing schedule and follow up appointment given to patient. Progress note routed to referring physicians office. Discussed with patient the Pharmacist Collaborative Practice Agreement. Patient provided verbal and/or electronic (ex. [employer]) consent to participate in the collaborative practice agreement between the pharmacist and referred patient. This is in lieu of paper consent due to COVID-19 precautions and the use of remote/virtual visits.        For Pharmacy Admin Tracking Only    Intervention Detail:   Total # of Interventions Recommended: 0  Total # of Interventions Accepted: 0  Time Spent (min): 15

## 2022-11-29 ENCOUNTER — HOSPITAL ENCOUNTER (OUTPATIENT)
Dept: PHARMACY | Age: 77
Setting detail: THERAPIES SERIES
Discharge: HOME | End: 2022-11-29
Payer: MEDICARE

## 2022-11-29 DIAGNOSIS — I48.91 ATRIAL FIBRILLATION, UNSPECIFIED TYPE (HCC): Primary | ICD-10-CM

## 2022-11-29 LAB
INR BLD: 2
PROTIME: 24.4 SECONDS

## 2022-11-29 PROCEDURE — 99211 OFF/OP EST MAY X REQ PHY/QHP: CPT

## 2022-11-29 PROCEDURE — 85610 PROTHROMBIN TIME: CPT

## 2022-11-29 NOTE — PROGRESS NOTES
Patient seen in clinic for warfarin management due to atrial fibrillation with an INR goal of 2.0-3.0. Estimated duration of therapy is indefinite. Patient states compliant all of the time with regimen. No bleeding or thromboembolic side effects noted. No significant med or dietary changes. No significant recent illness or disease state changes. PT/INR done in office per protocol. INR is 2 which is therapeutic. Warfarin regimen will be continued at current dose 1.5mg Sun/Wed/Fri, and 3mg on all other days. Will retest in 5 weeks. Patient understands dosing directions and information discussed. Dosing schedule and follow up appointment given to patient. Progress note routed to referring physicians office. Discussed with patient the Pharmacist Collaborative Practice Agreement. Patient provided verbal and/or electronic (ex. [employer]) consent to participate in the collaborative practice agreement between the pharmacist and referred patient. This is in lieu of paper consent due to COVID-19 precautions and the use of remote/virtual visits.        For Pharmacy Admin Tracking Only    Intervention Detail:   Total # of Interventions Recommended: 0  Total # of Interventions Accepted: 0  Time Spent (min): 15

## 2022-12-13 ENCOUNTER — TELEPHONE (OUTPATIENT)
Dept: INTERNAL MEDICINE CLINIC | Age: 77
End: 2022-12-13

## 2022-12-13 DIAGNOSIS — I10 ESSENTIAL HYPERTENSION: Primary | ICD-10-CM

## 2022-12-13 NOTE — TELEPHONE ENCOUNTER
Patient was the heart doctor yesterday, they state that his kidney levels were elevated from the labs in July. Lynettent is asking if you need to see him sooner than January?     Please advise    Ok to [facility]

## 2022-12-14 NOTE — TELEPHONE ENCOUNTER
Patient states Dr Gilmar Guardado did not order any labs he was looking at the labs from 7/2022  Patient states he was unaware of follow up

## 2022-12-22 LAB

## 2023-01-03 ENCOUNTER — HOSPITAL ENCOUNTER (OUTPATIENT)
Dept: PHARMACY | Age: 78
Setting detail: THERAPIES SERIES
Discharge: HOME | End: 2023-01-03
Payer: MEDICARE

## 2023-01-03 DIAGNOSIS — I48.91 ATRIAL FIBRILLATION, UNSPECIFIED TYPE (HCC): Primary | ICD-10-CM

## 2023-01-03 LAB
INR BLD: 2.6
PROTIME: 30.6 SECONDS

## 2023-01-03 PROCEDURE — 85610 PROTHROMBIN TIME: CPT

## 2023-01-03 PROCEDURE — 99211 OFF/OP EST MAY X REQ PHY/QHP: CPT

## 2023-01-03 NOTE — PROGRESS NOTES
Patient seen in clinic for warfarin management due to atrial fibrillation with an INR goal of 2.0-3.0. Estimated duration of therapy is indefinite. Patient states compliant all of the time with regimen. No bleeding or thromboembolic side effects noted. No significant med or dietary changes. No significant recent illness or disease state changes. PT/INR done in office per protocol. INR is 2.6 which is therapeutic. Warfarin regimen will be continued at current dose 1.5mg Sun/Wed/Fri, 3mg all other days. Will retest in 5 weeks. Patient understands dosing directions and information discussed. Dosing schedule and follow up appointment given to patient. Progress note routed to referring physicians office. Discussed with patient the Pharmacist Collaborative Practice Agreement. Patient provided verbal and/or electronic (ex. [employer]) consent to participate in the collaborative practice agreement between the pharmacist and referred patient. This is in lieu of paper consent due to COVID-19 precautions and the use of remote/virtual visits.        For Pharmacy Admin Tracking Only    Intervention Detail:   Total # of Interventions Recommended: 0  Total # of Interventions Accepted: 0  Time Spent (min): 15

## 2023-01-18 ENCOUNTER — OFFICE VISIT (OUTPATIENT)
Dept: INTERNAL MEDICINE CLINIC | Age: 78
End: 2023-01-18

## 2023-01-18 VITALS
OXYGEN SATURATION: 99 % | SYSTOLIC BLOOD PRESSURE: 132 MMHG | HEART RATE: 98 BPM | RESPIRATION RATE: 18 BRPM | BODY MASS INDEX: 28.4 KG/M2 | DIASTOLIC BLOOD PRESSURE: 80 MMHG | TEMPERATURE: 98.2 F | WEIGHT: 198.4 LBS | HEIGHT: 70 IN

## 2023-01-18 DIAGNOSIS — I48.91 ATRIAL FIBRILLATION, UNSPECIFIED TYPE (HCC): ICD-10-CM

## 2023-01-18 DIAGNOSIS — I10 ESSENTIAL HYPERTENSION: Primary | ICD-10-CM

## 2023-01-18 DIAGNOSIS — I10 ESSENTIAL HYPERTENSION: ICD-10-CM

## 2023-01-18 DIAGNOSIS — E78.00 HIGH CHOLESTEROL: ICD-10-CM

## 2023-01-18 DIAGNOSIS — R79.89 ELEVATED SERUM CREATININE: ICD-10-CM

## 2023-01-18 RX ORDER — WARFARIN SODIUM 3 MG/1
3 TABLET ORAL DAILY
Qty: 30 TABLET | Refills: 1 | Status: SHIPPED | OUTPATIENT
Start: 2023-01-18

## 2023-01-18 RX ORDER — METOPROLOL TARTRATE 50 MG/1
50 TABLET, FILM COATED ORAL 2 TIMES DAILY
Qty: 180 TABLET | Refills: 0 | Status: SHIPPED | OUTPATIENT
Start: 2023-01-18

## 2023-01-18 RX ORDER — HYDRALAZINE HYDROCHLORIDE 25 MG/1
25 TABLET, FILM COATED ORAL 3 TIMES DAILY
Qty: 270 TABLET | Refills: 0 | Status: SHIPPED | OUTPATIENT
Start: 2023-01-18

## 2023-01-18 RX ORDER — DILTIAZEM HYDROCHLORIDE 240 MG/1
240 CAPSULE, EXTENDED RELEASE ORAL DAILY
Qty: 90 CAPSULE | Refills: 0 | Status: SHIPPED | OUTPATIENT
Start: 2023-01-18

## 2023-01-18 RX ORDER — ALLOPURINOL 100 MG/1
100 TABLET ORAL 2 TIMES DAILY
Qty: 60 TABLET | Refills: 2 | Status: SHIPPED | OUTPATIENT
Start: 2023-01-18

## 2023-01-18 RX ORDER — ATORVASTATIN CALCIUM 20 MG/1
20 TABLET, FILM COATED ORAL DAILY
Qty: 90 TABLET | Refills: 0 | Status: SHIPPED | OUTPATIENT
Start: 2023-01-18

## 2023-01-18 RX ORDER — LOSARTAN POTASSIUM 100 MG/1
TABLET ORAL
Qty: 90 TABLET | Refills: 0 | Status: SHIPPED | OUTPATIENT
Start: 2023-01-18

## 2023-01-18 ASSESSMENT — PATIENT HEALTH QUESTIONNAIRE - PHQ9
SUM OF ALL RESPONSES TO PHQ QUESTIONS 1-9: 0
1. LITTLE INTEREST OR PLEASURE IN DOING THINGS: 0
SUM OF ALL RESPONSES TO PHQ QUESTIONS 1-9: 0
SUM OF ALL RESPONSES TO PHQ9 QUESTIONS 1 & 2: 0
SUM OF ALL RESPONSES TO PHQ QUESTIONS 1-9: 0
2. FEELING DOWN, DEPRESSED OR HOPELESS: 0
SUM OF ALL RESPONSES TO PHQ QUESTIONS 1-9: 0

## 2023-01-18 NOTE — PROGRESS NOTES
Tito Patterson is a 68 y.o. male who presents for   Chief Complaint   Patient presents with    Hypertension     6 month follow up     Health Maintenance     Dtap, awv    and follow up of chronic medical problems. Patient Active Problem List   Diagnosis    Colon polyp    Afib (HCC)     HPI  Here for follow-up on blood pressure and cholesterol denies any new complaints    Current Outpatient Medications   Medication Sig Dispense Refill    allopurinol (ZYLOPRIM) 100 MG tablet Take 1 tablet by mouth 2 times daily 60 tablet 2    atorvastatin (LIPITOR) 20 MG tablet Take 1 tablet by mouth daily 90 tablet 0    warfarin (COUMADIN) 3 MG tablet Take 1 tablet by mouth daily As [facility] 30 tablet 1    dilTIAZem (DILACOR XR) 240 MG extended release capsule Take 1 capsule by mouth daily 90 capsule 0    metoprolol tartrate (LOPRESSOR) 50 MG tablet Take 1 tablet by mouth 2 times daily 180 tablet 0    hydrALAZINE (APRESOLINE) 25 MG tablet Take 1 tablet by mouth 3 times daily 270 tablet 0    losartan (COZAAR) 100 MG tablet TAKE 1 TABLET BY MOUTH ONCE DAILY 90 tablet 0    prednisoLONE acetate (PRED FORTE) 1 % ophthalmic suspension Apply 1 drop to eye 4 times daily      timolol (TIMOPTIC) 0.5 % ophthalmic solution INSTILL 1 DROP INTO EACH EYE IN THE MORNING      famotidine (PEPCID) 20 MG tablet Take 20 mg by mouth every 3 days      SIMBRINZA 1-0.2 % SUSP INSTILL 1 DROP INTO EACH EYE TWICE DAILY      leuprolide (ELIGARD) 7.5 MG KIT SC injection Gets every 6 months. acetaminophen (TYLENOL) 500 MG tablet Take 500 mg by mouth every 6 hours as needed for Pain      Ipratropium-Albuterol (COMBIVENT IN) Inhale 2 puffs into the lungs daily as needed. bimatoprost (LUMIGAN) 0.01 % SOLN ophthalmic drops Place 1 drop into both eyes nightly      aspirin 81 MG tablet Take 81 mg by mouth daily. folic acid (FOLVITE) 1 MG tablet Take 1 mg by mouth every other day.        No current facility-administered medications for this visit.       No Known Allergies    Past Medical History:   Diagnosis Date    Atrial fibrillation (HCC) 2005    Cancer (HCC) 2004    prostate    CKD (chronic kidney disease)     Elevated brain natriuretic peptide (BNP) level 01/19/2021    3,303    GERD (gastroesophageal reflux disease)     Glaucoma     Gout     History of colon polyps     Hx of blood clots     leg - patient unsure which leg    Hyperlipidemia     Hypertension     Stroke (HCC)     Unspecified cerebral artery occlusion with cerebral infarction 2005    no deficits    UTI (urinary tract infection)     Warfarin anticoagulation     Wears partial dentures     TOP       Past Surgical History:   Procedure Laterality Date    COLONOSCOPY      States total of 5-6 colonoscopies in the past    COLONOSCOPY N/A 02/08/2018    COLONOSCOPY (PT IN [facility])  WITH COLD BX performed by Herbert Pittman MD at [facility] OR    COLONOSCOPY N/A 3/22/2021    COLONOSCOPY POLYPECTOMY HOT BIOPSY with application resolution clip performed by Herbert Pittman MD at [facility] ENDO    EYE SURGERY      left.    POLYPECTOMY      benign with colonoscopy    PROSTATECTOMY  2005       Family History   Problem Relation Age of Onset    Stroke Mother     Kidney Disease Father     Diabetes Father     Stroke Sister     Heart Attack Sister 44    Diabetes Brother      ROS  Constitutional:  Negative for fatigue, loss of appetite and unexpected weight change  HEENT            : Negative for neck stiffness and pain, no congestion or sinus pressure  Eyes                : No visual disturbance or pain  Cardiovascular: No chest pain or palpitations or leg swelling  Respiratory      : Negative for cough, shortness of breath or wheezing  Gastrointestinal: Negative for abdominal pain, constipation or diarrhea and bloating No nausea or vomiting  Genitourinary:     No urgency or frequency, no burning or hematuria  Musculoskeletal: No arthralgias, back pain or myalgias  Skin                  :  Negative for rash or erythema  Neurological    : Negative for dizziness, weakness, tremors ,light headedness or syncope  Psychiatric       : Negative for dysphoric mood, sleep disturbances, nervous or anxious, or decreased concentration  All other review of systems was negative    Objective  Physical Examination:    Nursing note reviewed    /80 (Site: Left Upper Arm, Position: Sitting, Cuff Size: Large Adult)   Pulse 98   Temp 98.2 °F (36.8 °C) (Temporal)   Resp 18   Ht 5' 10\" (1.778 m)   Wt 198 lb 6.4 oz (90 kg)   SpO2 99%   BMI 28.47 kg/m²   BP Readings from Last 3 Encounters:   01/18/23 132/80   07/18/22 138/78   04/19/22 124/78         Constitutional:  Blue Prim is oriented to place, person and time ,appears well-developed and well-nourished  HEENT:  Atraumatic and normocephalic, external ears normal bilaterally, nose normal no oropharyngeal exudate and is clear and moist  Eyes:  EOCM normal; conjunctivae normal; PERRLA bilaterally  Neck:  Normal range of motion, neck supple, no JVD and no thyromegaly  Cardiovascular:  RRR, normal heart sounds and intact distal pulses  Pulmonary:  effort normal and breath sounds normal bilaterally,no wheezes or rales, no respiratory distress  Abdominal:  Soft, non-tender; normal bowel sounds, no masses  Musculoskeletal:  Normal range of motion and no edema or tenderness bilaterally  No lymphadenopathy  Neurological:  alert, oriented, and normal reflexes bilaterally  Skin: warm and dry  Psychiatric:  normal mood and effect; behavior normal.    Labs:   No results found for: LABA1C  Lab Results   Component Value Date    CHOL 157 07/20/2022     Lab Results   Component Value Date    HDL 44 07/20/2022     Lab Results   Component Value Date    LDLCALC 76 07/20/2022     Lab Results   Component Value Date    TRIG 184 (H) 07/20/2022     No results found for: CHOLHDL  Lab Results   Component Value Date    WBC 9.3 01/19/2021    HGB 13.8 01/19/2021    HCT 42.9 01/19/2021    MCV 93.1 01/19/2021     01/19/2021     Lab Results   Component Value Date    INR 2.6 01/03/2023    PROTIME 30.6 01/03/2023     Lab Results   Component Value Date    GLUCOSE 104 07/20/2022    CREATININE 1.50 (H) 07/20/2022    BUN 15 07/20/2022     07/20/2022    K 3.9 07/20/2022     07/20/2022    CO2 26 07/20/2022     Lab Results   Component Value Date    ALT 16 07/20/2022    AST 25 07/20/2022    ALKPHOS 52 07/20/2022    BILITOT 0.6 07/20/2022     Lab Results   Component Value Date    LABALBU 4.5 07/20/2022     No results found for: TSH, CBC  Assessment:  1. Essential hypertension    2. Atrial fibrillation, unspecified type ([facility] [address]    3. High cholesterol    4. Elevated serum creatinine        Plan:  Patient's blood pressure is stable on current medications  Patient follows with Dr. Leopoldo [facility] cardiology and has seen him last month and patient currently on hydralazine diltiazem metoprolol and warfarin for his A. Fib  Patient is on atorvastatin for his cholesterol and last cholesterol profile is stable and last cholesterol 137 and LDL 76 and HDL 44  Patient's kidney function explained to the patient last creatinine was 1.44 last month and GFR was 57.6 and prior to that it was 1.60 and 1.5 and advised patient to continue to monitor and will repeat BUN/creatinine in 3 months  Review in 6 months           1. Gray Martell received counseling on the following healthy behaviors: nutrition and exercise    2. Prior labs and health maintenance reviewed. 3.  Discussed use, benefit, and side effects of prescribed medications. Barriers to medication compliance addressed. All his questions were answered. Pt voiced understanding. Gray Martell will continue current medications, diet and exercise.               Orders Placed This Encounter   Medications    allopurinol (ZYLOPRIM) 100 MG tablet     Sig: Take 1 tablet by mouth 2 times daily     Dispense:  60 tablet     Refill:  2    atorvastatin (LIPITOR) 20 MG tablet     Sig: Take 1 tablet by mouth daily     Dispense:  90 tablet     Refill:  0    warfarin (COUMADIN) 3 MG tablet     Sig: Take 1 tablet by mouth daily As directed by [facility]     Dispense:  30 tablet     Refill:  1    dilTIAZem (DILACOR XR) 240 MG extended release capsule     Sig: Take 1 capsule by mouth daily     Dispense:  90 capsule     Refill:  0    metoprolol tartrate (LOPRESSOR) 50 MG tablet     Sig: Take 1 tablet by mouth 2 times daily     Dispense:  180 tablet     Refill:  0    hydrALAZINE (APRESOLINE) 25 MG tablet     Sig: Take 1 tablet by mouth 3 times daily     Dispense:  270 tablet     Refill:  0    losartan (COZAAR) 100 MG tablet     Sig: TAKE 1 TABLET BY MOUTH ONCE DAILY     Dispense:  90 tablet     Refill:  0          Completed Refills               Requested Prescriptions     Signed Prescriptions Disp Refills    allopurinol (ZYLOPRIM) 100 MG tablet 60 tablet 2     Sig: Take 1 tablet by mouth 2 times daily    atorvastatin (LIPITOR) 20 MG tablet 90 tablet 0     Sig: Take 1 tablet by mouth daily    warfarin (COUMADIN) 3 MG tablet 30 tablet 1     Sig: Take 1 tablet by mouth daily As directed by [facility]    dilTIAZem (DILACOR XR) 240 MG extended release capsule 90 capsule 0     Sig: Take 1 capsule by mouth daily    metoprolol tartrate (LOPRESSOR) 50 MG tablet 180 tablet 0     Sig: Take 1 tablet by mouth 2 times daily    hydrALAZINE (APRESOLINE) 25 MG tablet 270 tablet 0     Sig: Take 1 tablet by mouth 3 times daily    losartan (COZAAR) 100 MG tablet 90 tablet 0     Sig: TAKE 1 TABLET BY MOUTH ONCE DAILY     4. Patient given educational materials - see patient instructions    5. Was a self-tracking handout given in paper form or via Its[employer]URL[employer]art? NO    Orders Placed This Encounter   Procedures    Basic Metabolic Panel     Standing Status:   Future     Standing Expiration Date:   1/18/2024     Return in about 6 months (around 7/18/2023).   Patient voiced understanding and agreed to treatment plan. Electronically signed by Olamide Vargas MD on 1/18/2023 at 1:59 PM    This note is created with a voice recognition program and while intend to generate a document that accurately reflects the content of the visit, no guarantee can be provided that every mistake has been identified and corrected by editing.

## 2023-02-07 ENCOUNTER — HOSPITAL ENCOUNTER (OUTPATIENT)
Dept: PHARMACY | Age: 78
Setting detail: THERAPIES SERIES
Discharge: HOME | End: 2023-02-07
Payer: MEDICARE

## 2023-02-07 DIAGNOSIS — I48.91 ATRIAL FIBRILLATION, UNSPECIFIED TYPE (HCC): Primary | ICD-10-CM

## 2023-02-07 LAB
INR BLD: 2.5
PROTIME: 29.8 SECONDS

## 2023-02-07 PROCEDURE — 85610 PROTHROMBIN TIME: CPT

## 2023-02-07 PROCEDURE — 99211 OFF/OP EST MAY X REQ PHY/QHP: CPT

## 2023-02-07 NOTE — PROGRESS NOTES
Patient seen in clinic for warfarin management due to atrial fibrillation with an INR goal of 2.0-3.0. Estimated duration of therapy is indefinite. Patient states compliant all of the time with regimen. No bleeding or thromboembolic side effects noted. No significant med or dietary changes. No significant recent illness or disease state changes. PT/INR done in office per protocol. INR is 2.5 which is therapeutic. Warfarin regimen will be continued at current dose 1.5 mg every Sun, Wed, Fri and 3 mg all other days. Will retest in 5 weeks. Patient understands dosing directions and information discussed. Dosing schedule and follow up appointment given to patient. Progress note routed to referring physicians office. Discussed with patient the Pharmacist Collaborative Practice Agreement. Patient provided verbal and/or electronic (ex. [employer]) consent to participate in the collaborative practice agreement between the pharmacist and referred patient. This is in lieu of paper consent due to COVID-19 precautions and the use of remote/virtual visits.        For Pharmacy Admin Tracking Only    Intervention Detail:   Total # of Interventions Recommended: 0  Total # of Interventions Accepted: 0  Time Spent (min): 15

## 2023-02-14 RX ORDER — WARFARIN SODIUM 3 MG/1
3 TABLET ORAL DAILY
Qty: 90 TABLET | Refills: 0 | Status: SHIPPED | OUTPATIENT
Start: 2023-02-14

## 2023-02-14 RX ORDER — ALLOPURINOL 100 MG/1
100 TABLET ORAL 2 TIMES DAILY
Qty: 180 TABLET | Refills: 0 | Status: SHIPPED | OUTPATIENT
Start: 2023-02-14

## 2023-02-14 NOTE — TELEPHONE ENCOUNTER
Mynor Weeks is calling to request a refill on the following medication(s):    Medication Request:  Requested Prescriptions     Pending Prescriptions Disp Refills    allopurinol (ZYLOPRIM) 100 MG tablet 180 tablet 1     Sig: Take 1 tablet by mouth 2 times daily    warfarin (COUMADIN) 3 MG tablet 90 tablet      Sig: Take 1 tablet by mouth daily As directed by [address] [place] [address]       Last Visit Date (If Applicable):  8/28/6745    Next Visit Date:    7/19/2023

## 2023-02-20 RX ORDER — DILTIAZEM HYDROCHLORIDE 240 MG/1
CAPSULE, COATED, EXTENDED RELEASE ORAL
Qty: 90 CAPSULE | Refills: 0 | Status: SHIPPED | OUTPATIENT
Start: 2023-02-20

## 2023-02-20 NOTE — TELEPHONE ENCOUNTER
Cesar Moncada is calling to request a refill on the following medication(s):    Medication Request:  Requested Prescriptions     Pending Prescriptions Disp Refills    dilTIAZem (CARDIZEM CD) 240 MG extended release capsule [Pharmacy Med Name: dilTIAZem HCl ER Coated Beads 240 MG Oral Capsule Extended Release 24 Hour] 90 capsule 0     Sig: Take 1 capsule by mouth once daily       Last Visit Date (If Applicable):  5/88/1434    Next Visit Date:    7/19/2023

## 2023-03-14 ENCOUNTER — HOSPITAL ENCOUNTER (OUTPATIENT)
Dept: PHARMACY | Age: 78
Setting detail: THERAPIES SERIES
Discharge: HOME | End: 2023-03-14
Payer: MEDICARE

## 2023-03-14 DIAGNOSIS — I48.91 ATRIAL FIBRILLATION, UNSPECIFIED TYPE (HCC): Primary | ICD-10-CM

## 2023-03-14 LAB
INR BLD: 2.3
PROTIME: 27.5 SECONDS

## 2023-03-14 PROCEDURE — 99211 OFF/OP EST MAY X REQ PHY/QHP: CPT

## 2023-03-14 PROCEDURE — 85610 PROTHROMBIN TIME: CPT

## 2023-03-14 NOTE — PROGRESS NOTES
Patient seen in clinic for warfarin management due to atrial fibrillation with an INR goal of 2.0-3.0. Estimated duration of therapy is indefinite. Patient states compliant all of the time with regimen. No bleeding or thromboembolic side effects noted. No significant med or dietary changes. No significant recent illness or disease state changes. PT/INR done in office per protocol. INR is 2.3 which is therapeutic. Warfarin regimen will be continued at current dose 1.5mg Sun/Wed/Fri, 3mg all other days. Will retest in 5 weeks. Patient understands dosing directions and information discussed. Dosing schedule and follow up appointment given to patient. Progress note routed to referring physicians office. Discussed with patient the Pharmacist Collaborative Practice Agreement. Patient provided verbal and/or electronic (ex. [employer]) consent to participate in the collaborative practice agreement between the pharmacist and referred patient. This is in lieu of paper consent due to COVID-19 precautions and the use of remote/virtual visits.        For Pharmacy Admin Tracking Only    Intervention Detail:   Total # of Interventions Recommended: 0  Total # of Interventions Accepted: 0  Time Spent (min): 15

## 2023-04-17 ENCOUNTER — HOSPITAL ENCOUNTER (OUTPATIENT)
Dept: PHARMACY | Age: 78
Setting detail: THERAPIES SERIES
Discharge: HOME | End: 2023-04-17
Payer: MEDICARE

## 2023-04-17 DIAGNOSIS — I48.91 ATRIAL FIBRILLATION, UNSPECIFIED TYPE (HCC): Primary | ICD-10-CM

## 2023-04-17 LAB
INR BLD: 2.4
PROTIME: 29.1 SECONDS

## 2023-04-17 PROCEDURE — 99211 OFF/OP EST MAY X REQ PHY/QHP: CPT

## 2023-04-17 PROCEDURE — 85610 PROTHROMBIN TIME: CPT

## 2023-04-17 NOTE — PROGRESS NOTES
Patient seen in clinic for warfarin management due to atrial fibrillation with an INR goal of 2.0-3.0. Estimated duration of therapy is indefinite. Patient states compliant all of the time with regimen. No bleeding or thromboembolic side effects noted. No significant med or dietary changes. No significant recent illness or disease state changes. PT/INR done in office per protocol. INR is 2.4 which is therapeutic. Warfarin regimen will be continued at current dose of 1.5mg Sun/Wed/Fri and 3mg all other days. Will retest in 5 weeks. Patient understands dosing directions and information discussed. Dosing schedule and follow up appointment given to patient. Progress note routed to referring physicians office. Discussed with patient the Pharmacist Collaborative Practice Agreement. Patient provided verbal and/or electronic (ex. [employer]) consent to participate in the collaborative practice agreement between the pharmacist and referred patient. This is in lieu of paper consent due to COVID-19 precautions and the use of remote/virtual visits.        For Pharmacy Admin Tracking Only    Intervention Detail:   Total # of Interventions Recommended: 0  Total # of Interventions Accepted: 0  Time Spent (min): 15

## 2023-04-18 ENCOUNTER — APPOINTMENT (OUTPATIENT)
Dept: PHARMACY | Age: 78
End: 2023-04-18
Payer: MEDICARE

## 2023-05-09 RX ORDER — METOPROLOL TARTRATE 50 MG/1
TABLET, FILM COATED ORAL
Qty: 180 TABLET | Refills: 0 | Status: SHIPPED | OUTPATIENT
Start: 2023-05-09

## 2023-05-09 RX ORDER — ATORVASTATIN CALCIUM 20 MG/1
TABLET, FILM COATED ORAL
Qty: 90 TABLET | Refills: 0 | Status: SHIPPED | OUTPATIENT
Start: 2023-05-09

## 2023-05-09 RX ORDER — HYDRALAZINE HYDROCHLORIDE 25 MG/1
TABLET, FILM COATED ORAL
Qty: 270 TABLET | Refills: 0 | Status: SHIPPED | OUTPATIENT
Start: 2023-05-09

## 2023-05-09 RX ORDER — LOSARTAN POTASSIUM 100 MG/1
TABLET ORAL
Qty: 90 TABLET | Refills: 0 | Status: SHIPPED | OUTPATIENT
Start: 2023-05-09

## 2023-05-09 NOTE — TELEPHONE ENCOUNTER
Belle Carrillo is calling to request a refill on the following medication(s):    Medication Request:  Requested Prescriptions     Pending Prescriptions Disp Refills    hydrALAZINE (APRESOLINE) 25 MG tablet [Pharmacy Med Name: hydrALAZINE HCl 25 MG Oral Tablet] 270 tablet 0     Sig: TAKE 1 TABLET BY MOUTH THREE TIMES DAILY    metoprolol tartrate (LOPRESSOR) 50 MG tablet [Pharmacy Med Name: Metoprolol Tartrate 50 MG Oral Tablet] 180 tablet 0     Sig: Take 1 tablet by mouth twice daily    losartan (COZAAR) 100 MG tablet [Pharmacy Med Name: Losartan Potassium 100 MG Oral Tablet] 90 tablet 0     Sig: Take 1 tablet by mouth once daily    atorvastatin (LIPITOR) 20 MG tablet [Pharmacy Med Name: Atorvastatin Calcium 20 MG Oral Tablet] 90 tablet 0     Sig: Take 1 tablet by mouth once daily       Last Visit Date (If Applicable):  9/47/0699    Next Visit Date:    7/19/2023

## 2023-05-22 ENCOUNTER — HOSPITAL ENCOUNTER (OUTPATIENT)
Dept: PHARMACY | Age: 78
Setting detail: THERAPIES SERIES
Discharge: HOME | End: 2023-05-22
Payer: MEDICARE

## 2023-05-22 DIAGNOSIS — I48.91 ATRIAL FIBRILLATION, UNSPECIFIED TYPE (HCC): Primary | ICD-10-CM

## 2023-05-22 LAB
INR BLD: 2.3
PROTIME: 27.5 SECONDS

## 2023-05-22 PROCEDURE — 85610 PROTHROMBIN TIME: CPT

## 2023-05-22 PROCEDURE — 99211 OFF/OP EST MAY X REQ PHY/QHP: CPT

## 2023-05-22 NOTE — PROGRESS NOTES
Patient seen in clinic for warfarin management due to atrial fibrillation with an INR goal of 2.0-3.0. Estimated duration of therapy is indefinite. Patient states compliant all of the time with regimen. No bleeding or thromboembolic side effects noted. No significant med or dietary changes. No significant recent illness or disease state changes. PT/INR done in office per protocol. INR is 2.3 which is therapeutic. Warfarin regimen will be continued at current dose of 1.5mg Sun/Wed/Fri and 3mg all other days. Will retest in 5 weeks. Patient understands dosing directions and information discussed. Dosing schedule and follow up appointment given to patient. Progress note routed to referring physicians office. Discussed with patient the Pharmacist Collaborative Practice Agreement. Patient provided verbal and/or electronic (ex. [employer]) consent to participate in the collaborative practice agreement between the pharmacist and referred patient. This is in lieu of paper consent due to COVID-19 precautions and the use of remote/virtual visits.        For Pharmacy Admin Tracking Only    Intervention Detail:   Total # of Interventions Recommended: 0  Total # of Interventions Accepted: 0  Time Spent (min): 15

## 2023-05-29 NOTE — PROGRESS NOTES
Patient seen in clinic for warfarin management due to atrial fibrillation with an INR goal of 2.0-3.0. Estimated duration of therapy is indefinite. Patient states compliant all of the time with regimen. No bleeding or thromboembolic side effects noted. No significant med or dietary changes. No significant recent illness or disease state changes. PT/INR done in office per protocol. INR is 2.7 which is therapeutic. Warfarin regimen will be continued at current dose of 1.5mg Sun/Wed/Fri and 3mg all other days. Will retest in 4 weeks. Patient understands dosing directions and information discussed. Dosing schedule and follow up appointment given to patient. Progress note routed to referring physicians office. Discussed with patient the Pharmacist Collaborative Practice Agreement. Patient provided verbal and/or electronic (ex. [employer]) consent to participate in the collaborative practice agreement between the pharmacist and referred patient. This is in lieu of paper consent due to COVID-19 precautions and the use of remote/virtual visits.        For Pharmacy Admin Tracking Only    Intervention Detail:   Total # of Interventions Recommended: 0  Total # of Interventions Accepted: 0  Time Spent (min): 15
[facility]

## 2023-06-26 ENCOUNTER — TELEPHONE (OUTPATIENT)
Dept: PHARMACY | Age: 78
End: 2023-06-26

## 2023-06-26 DIAGNOSIS — I48.91 ATRIAL FIBRILLATION, UNSPECIFIED TYPE (HCC): Primary | ICD-10-CM

## 2023-06-26 NOTE — TELEPHONE ENCOUNTER
Patient's wife LVM to cancel INR appt today as patient is currently admitted. She states he will call clinic once he is discharged home.

## 2023-06-30 ENCOUNTER — TELEPHONE (OUTPATIENT)
Dept: PHARMACY | Age: 78
End: 2023-06-30

## 2023-06-30 DIAGNOSIS — I48.91 ATRIAL FIBRILLATION, UNSPECIFIED TYPE (HCC): Primary | ICD-10-CM

## 2023-07-03 ENCOUNTER — HOSPITAL ENCOUNTER (OUTPATIENT)
Dept: PHARMACY | Age: 78
Setting detail: THERAPIES SERIES
Discharge: HOME | End: 2023-07-03
Payer: MEDICARE

## 2023-07-03 DIAGNOSIS — I48.91 ATRIAL FIBRILLATION, UNSPECIFIED TYPE (HCC): Primary | ICD-10-CM

## 2023-07-03 LAB
INR BLD: 2.1
PROTIME: 24.6 SECONDS

## 2023-07-03 PROCEDURE — 99211 OFF/OP EST MAY X REQ PHY/QHP: CPT

## 2023-07-03 PROCEDURE — 85610 PROTHROMBIN TIME: CPT

## 2023-07-03 NOTE — PROGRESS NOTES
Patient seen in clinic for warfarin management due to atrial fibrillation with an INR goal of 2.0-3.0. Estimated duration of therapy is indefinite. Patient states  he has been taking 5mg tab daily since he was discharged home on Friday 6/30; per chart his hospital dose was increased for low INR in the days prior to discharge . Prior to hospital admission he was on 1.5mg Sun/Wed/Fri, 3mg all other days at home. No bleeding or thromboembolic side effects noted. No significant med or dietary changes. PT/INR done in office per protocol. INR is 2.1 which is therapeutic. Warfarin regimen will be continued with his home supply of 3mg tabs and will take 1.5mg Friday, 3mg all other days until next week's appt. Will retest in 1 week. Patient understands dosing directions and information discussed. Dosing schedule and follow up appointment given to patient. Progress note routed to referring physicians office. Discussed with patient the Pharmacist Collaborative Practice Agreement. Patient provided verbal and/or electronic (ex. [employer]) consent to participate in the collaborative practice agreement between the pharmacist and referred patient. This is in lieu of paper consent due to COVID-19 precautions and the use of remote/virtual visits.        For Pharmacy Admin Tracking Only    Intervention Detail: Dose Adjustment: 1, reason: Therapy Optimization  Total # of Interventions Recommended: 1  Total # of Interventions Accepted: 1  Time Spent (min): 15
none

## 2023-07-10 ENCOUNTER — HOSPITAL ENCOUNTER (OUTPATIENT)
Dept: PHARMACY | Age: 78
Setting detail: THERAPIES SERIES
Discharge: HOME | End: 2023-07-10
Payer: MEDICARE

## 2023-07-10 ENCOUNTER — TELEPHONE (OUTPATIENT)
Dept: INTERNAL MEDICINE CLINIC | Age: 78
End: 2023-07-10

## 2023-07-10 DIAGNOSIS — I48.91 ATRIAL FIBRILLATION, UNSPECIFIED TYPE (HCC): Primary | ICD-10-CM

## 2023-07-10 LAB
INR BLD: 2.2
PROTIME: 26.8 SECONDS

## 2023-07-10 PROCEDURE — 99212 OFFICE O/P EST SF 10 MIN: CPT

## 2023-07-10 PROCEDURE — 85610 PROTHROMBIN TIME: CPT

## 2023-07-10 RX ORDER — BRINZOLAMIDE 10 MG/ML
SUSPENSION/ DROPS OPHTHALMIC
COMMUNITY
Start: 2023-06-24

## 2023-07-10 RX ORDER — KETOROLAC TROMETHAMINE 5 MG/ML
SOLUTION OPHTHALMIC
COMMUNITY
Start: 2023-04-16 | End: 2023-07-10 | Stop reason: ALTCHOICE

## 2023-07-10 RX ORDER — BRIMONIDINE TARTRATE 2 MG/ML
SOLUTION/ DROPS OPHTHALMIC
COMMUNITY
Start: 2023-06-24

## 2023-07-10 RX ORDER — DILTIAZEM HYDROCHLORIDE 60 MG/1
TABLET, FILM COATED ORAL
COMMUNITY
Start: 2023-06-24

## 2023-07-10 NOTE — PROGRESS NOTES
Patient seen in clinic for warfarin management due to atrial fibrillation with an INR goal of 2.0-3.0. Estimated duration of therapy is indefinite. Patient states compliant all of the time with regimen. No bleeding or thromboembolic side effects noted. Patient states he has been eating less overall since being in the hospital/rehab for 26 days. He also states he will find out on Friday what the treatment plan will be for the recently diagnosed cancer discovered after routine dental cleaning. He had removal of growth and biopsy while admitted and will have follow up to next steps. Medications were changed and med list has been updated. PT/INR done in office per protocol. INR is 2.2 which is therapeutic. Warfarin regimen will be continued with increased maintenance dose of 1.5mg Friday only, and 3mg all other days. Will retest in 2 weeks. Patient understands dosing directions and information discussed. Dosing schedule and follow up appointment given to patient. Progress note routed to referring physicians office. Discussed with patient the Pharmacist Collaborative Practice Agreement. Patient provided verbal and/or electronic (ex. [employer]) consent to participate in the collaborative practice agreement between the pharmacist and referred patient. This is in lieu of paper consent due to COVID-19 precautions and the use of remote/virtual visits.        For Pharmacy Admin Tracking Only    Intervention Detail: Dose Adjustment: 1, reason: Therapy Optimization  Total # of Interventions Recommended: 1  Total # of Interventions Accepted: 1  Time Spent (min): 15

## 2023-07-10 NOTE — TELEPHONE ENCOUNTER
Patient reports constipation x 2 days. Patient has tried senokot Miralax OTC and it only helped a little. Patient reports drinking Boost that has iron in it.

## 2023-07-11 ENCOUNTER — HOSPITAL ENCOUNTER (EMERGENCY)
Age: 78
Discharge: HOME | End: 2023-07-11
Attending: EMERGENCY MEDICINE
Payer: MEDICARE

## 2023-07-11 ENCOUNTER — APPOINTMENT (OUTPATIENT)
Dept: GENERAL RADIOLOGY | Age: 78
End: 2023-07-11
Payer: MEDICARE

## 2023-07-11 ENCOUNTER — TELEPHONE (OUTPATIENT)
Dept: INTERNAL MEDICINE CLINIC | Age: 78
End: 2023-07-11

## 2023-07-11 VITALS
TEMPERATURE: 98 F | DIASTOLIC BLOOD PRESSURE: 78 MMHG | HEIGHT: 72 IN | RESPIRATION RATE: 16 BRPM | WEIGHT: 174 LBS | OXYGEN SATURATION: 100 % | HEART RATE: 82 BPM | SYSTOLIC BLOOD PRESSURE: 123 MMHG | BODY MASS INDEX: 23.57 KG/M2

## 2023-07-11 DIAGNOSIS — K59.00 CONSTIPATION, UNSPECIFIED CONSTIPATION TYPE: Primary | ICD-10-CM

## 2023-07-11 LAB
INR PPP: 2.5
PROTHROMBIN TIME: 26.7 SEC (ref 11.5–14.2)

## 2023-07-11 PROCEDURE — 74018 RADEX ABDOMEN 1 VIEW: CPT

## 2023-07-11 PROCEDURE — 6370000000 HC RX 637 (ALT 250 FOR IP): Performed by: EMERGENCY MEDICINE

## 2023-07-11 PROCEDURE — 99284 EMERGENCY DEPT VISIT MOD MDM: CPT

## 2023-07-11 PROCEDURE — 85610 PROTHROMBIN TIME: CPT

## 2023-07-11 RX ORDER — LUBIPROSTONE 24 UG/1
24 CAPSULE ORAL 2 TIMES DAILY WITH MEALS
Qty: 60 CAPSULE | Refills: 0 | Status: SHIPPED | OUTPATIENT
Start: 2023-07-11

## 2023-07-11 RX ORDER — DOCUSATE SODIUM 100 MG/1
100 CAPSULE, LIQUID FILLED ORAL 2 TIMES DAILY
Qty: 60 CAPSULE | Refills: 0 | Status: SHIPPED | OUTPATIENT
Start: 2023-07-11 | End: 2023-08-10

## 2023-07-11 RX ORDER — ENEMA 19; 7 G/133ML; G/133ML
118 ENEMA RECTAL ONCE
Status: COMPLETED | OUTPATIENT
Start: 2023-07-11 | End: 2023-07-11

## 2023-07-11 RX ADMIN — GLYCERIN 2 G: 2 SUPPOSITORY RECTAL at 11:19

## 2023-07-11 RX ADMIN — SODIUM PHOSPHATE, DIBASIC AND SODIUM PHOSPHATE, MONOBASIC 1 ENEMA: 7; 19 ENEMA RECTAL at 11:19

## 2023-07-11 NOTE — TELEPHONE ENCOUNTER
----- Message from Olya Schilling sent at 7/10/2023  8:47 AM EDT -----  Subject: Message to Provider    QUESTIONS  Information for Provider? Pt would like something called in for   constipation x2 days. Pt has tried OTC medication will not much relieve. [facility] [address]. Pt thinks the ensure is making him   constipated. Pt would like a call back regarding this.   ---------------------------------------------------------------------------  --------------  Amberly Gann INFO  [phone]; OK to leave message on voicemail  ---------------------------------------------------------------------------  --------------  SCRIPT ANSWERS  Relationship to Patient?  Self

## 2023-07-11 NOTE — ED PROVIDER NOTES
EMERGENCY DEPARTMENT ENCOUNTER    Pt Name: Suha Charlton  MRN: [id number]  [address] [facility] [place] 1945  Date of evaluation: 7/11/23  CHIEF COMPLAINT       Chief Complaint   Patient presents with    Constipation     Pt states he has been constipated for a few days, last BM this AM. No abd pain. HISTORY OF PRESENT ILLNESS   The history is provided by the patient and medical records. Patient reports to the ED for constipation. Last meaningful bowel movement was 4 days ago. Had small bowel movement this morning. Reports passing gas. No vomiting, no abdominal pain. Typically has 1 bowel movement per day. No history of constipation. REVIEW OF SYSTEMS     Review of Systems  Review of Systems   All other systems reviewed and are negative. PASTMEDICAL HISTORY     Past Medical History:   Diagnosis Date    Atrial fibrillation ([address]) 2005    Cancer [facility]) 2004    prostate    CKD (chronic kidney disease)     Elevated brain natriuretic peptide (BNP) level 01/19/2021    3,303    GERD (gastroesophageal reflux disease)     Glaucoma     Gout     History of colon polyps     Hx of blood clots     leg - patient unsure which leg    Hyperlipidemia     Hypertension     Stroke [facility])     Unspecified cerebral artery occlusion with cerebral infarction 2005    no deficits    UTI (urinary tract infection)     Warfarin anticoagulation     Wears partial dentures     TOP     Past Problem List  Patient Active Problem List   Diagnosis Code    Colon polyp K63.5    Afib ([address]) I48.91     SURGICAL HISTORY       Past Surgical History:   Procedure Laterality Date    COLONOSCOPY      States total of 5-6 colonoscopies in the past    COLONOSCOPY N/A 02/08/2018    COLONOSCOPY (PT IN [facility])  WITH COLD BX performed by Germán Dumas MD at [address] N/A 3/22/2021    COLONOSCOPY POLYPECTOMY HOT BIOPSY with application resolution clip performed by Germán Dumas MD [facility]      left.     POLYPECTOMY      benign with

## 2023-07-11 NOTE — DISCHARGE INSTRUCTIONS
Try MiraLAX, 1-2 caps twice a day for 2 days, then 1/2 cap per day thereafter. Return to this emergency room immediately if your symptoms persist, worsen or if new ones form. Make sure you follow-up with your primary care doctor within the next 1-2 business days.

## 2023-07-17 ENCOUNTER — TELEPHONE (OUTPATIENT)
Dept: INTERNAL MEDICINE CLINIC | Age: 78
End: 2023-07-17

## 2023-07-19 ENCOUNTER — TELEPHONE (OUTPATIENT)
Dept: INTERNAL MEDICINE CLINIC | Age: 78
End: 2023-07-19

## 2023-07-19 NOTE — TELEPHONE ENCOUNTER
Patient prior authorization was approved for Lubiprostone capsules from the dates 01/01/2023--12/31/2023. Scanned into media and patient notified.

## 2023-07-24 ENCOUNTER — APPOINTMENT (OUTPATIENT)
Dept: PHARMACY | Age: 78
End: 2023-07-24
Payer: MEDICARE

## 2023-07-24 DIAGNOSIS — I48.91 ATRIAL FIBRILLATION, UNSPECIFIED TYPE (HCC): Primary | ICD-10-CM

## 2023-07-24 LAB
INR BLD: 2.4
PROTIME: 28.3 SECONDS

## 2023-07-24 PROCEDURE — 85610 PROTHROMBIN TIME: CPT

## 2023-07-24 PROCEDURE — 99211 OFF/OP EST MAY X REQ PHY/QHP: CPT

## 2023-07-24 NOTE — PROGRESS NOTES
Patient seen in clinic for warfarin management due to atrial fibrillation with an INR goal of 2.0-3.0. Estimated duration of therapy is indefinite. Patient states compliant all of the time with regimen. No bleeding or thromboembolic side effects noted. No significant med or dietary changes. No significant recent illness or disease state changes. He states his appetite is still decreased. PT/INR done in office per protocol. INR is 2.4 which is therapeutic. Warfarin regimen will be continued at current dose of 1.5mg fri and 3mg all other days. Will retest in 4 weeks. Patient understands dosing directions and information discussed. Dosing schedule and follow up appointment given to patient. Progress note routed to referring physicians office. Discussed with patient the Pharmacist Collaborative Practice Agreement. Patient provided verbal and/or electronic (ex. [employer]) consent to participate in the collaborative practice agreement between the pharmacist and referred patient. This is in lieu of paper consent due to COVID-19 precautions and the use of remote/virtual visits.        For Pharmacy Admin Tracking Only    Intervention Detail:   Total # of Interventions Recommended: 0  Total # of Interventions Accepted: 0  Time Spent (min): 15

## 2023-07-25 LAB
BUN BLDV-MCNC: 12 MG/DL
CALCIUM SERPL-MCNC: 9.4 MG/DL
CHLORIDE BLD-SCNC: 105 MMOL/L
CO2: 20 MMOL/L
CREAT SERPL-MCNC: 1.16 MG/DL
GFR AFRICAN AMERICAN: 73.7 ML/M1.7
GFR NON-AFRICAN AMERICAN: 60.9 ML/M1.7
GLUCOSE: 97 MG/DL
POTASSIUM SERPL-SCNC: 3.9 MMOL/L
SODIUM BLD-SCNC: 137 MMOL/L

## 2023-07-26 DIAGNOSIS — E78.00 HIGH CHOLESTEROL: ICD-10-CM

## 2023-07-26 DIAGNOSIS — I10 ESSENTIAL HYPERTENSION: ICD-10-CM

## 2023-07-26 DIAGNOSIS — I48.91 ATRIAL FIBRILLATION, UNSPECIFIED TYPE (HCC): ICD-10-CM

## 2023-07-28 NOTE — TELEPHONE ENCOUNTER
Dania Escamilla is calling to request a refill on the following medication(s):    Medication Request:  Requested Prescriptions     Pending Prescriptions Disp Refills    warfarin (COUMADIN) 3 MG tablet 90 tablet 0     Sig: Take 1 tablet by mouth daily As directed by Faustino Peck       Last Visit Date (If Applicable):  2/02/6409    Next Visit Date:    8/9/2023

## 2023-07-31 RX ORDER — WARFARIN SODIUM 3 MG/1
3 TABLET ORAL DAILY
Qty: 90 TABLET | Refills: 0 | Status: SHIPPED | OUTPATIENT
Start: 2023-07-31

## 2023-08-04 NOTE — TELEPHONE ENCOUNTER
Hakeem Mcdaniel is calling to request a refill on the following medication(s):    Medication Request:  Requested Prescriptions     Pending Prescriptions Disp Refills    metoprolol tartrate (LOPRESSOR) 50 MG tablet [Pharmacy Med Name: Metoprolol Tartrate 50 MG Oral Tablet] 180 tablet 0     Sig: Take 1 tablet by mouth twice daily       Last Visit Date (If Applicable):  6/45/7657    Next Visit Date:    8/9/2023      Last refill 5/9/23.  Prescription pending

## 2023-08-07 RX ORDER — METOPROLOL TARTRATE 50 MG/1
TABLET, FILM COATED ORAL
Qty: 180 TABLET | Refills: 0 | Status: SHIPPED | OUTPATIENT
Start: 2023-08-07

## 2023-08-09 ENCOUNTER — OFFICE VISIT (OUTPATIENT)
Dept: INTERNAL MEDICINE CLINIC | Age: 78
End: 2023-08-09
Payer: MEDICARE

## 2023-08-09 VITALS
RESPIRATION RATE: 18 BRPM | SYSTOLIC BLOOD PRESSURE: 118 MMHG | HEIGHT: 71 IN | HEART RATE: 61 BPM | DIASTOLIC BLOOD PRESSURE: 76 MMHG | OXYGEN SATURATION: 98 % | TEMPERATURE: 97 F | WEIGHT: 171.4 LBS | BODY MASS INDEX: 24 KG/M2

## 2023-08-09 DIAGNOSIS — Z91.81 AT HIGH RISK FOR FALLS: ICD-10-CM

## 2023-08-09 DIAGNOSIS — I48.91 ATRIAL FIBRILLATION, UNSPECIFIED TYPE (HCC): ICD-10-CM

## 2023-08-09 DIAGNOSIS — E27.8 ADRENAL NODULE (HCC): ICD-10-CM

## 2023-08-09 DIAGNOSIS — I10 ESSENTIAL HYPERTENSION: Primary | ICD-10-CM

## 2023-08-09 DIAGNOSIS — R79.89 ELEVATED SERUM CREATININE: ICD-10-CM

## 2023-08-09 DIAGNOSIS — C06.9 ORAL CAVITY CARCINOMA (HCC): ICD-10-CM

## 2023-08-09 PROCEDURE — 99214 OFFICE O/P EST MOD 30 MIN: CPT | Performed by: INTERNAL MEDICINE

## 2023-08-09 PROCEDURE — 3078F DIAST BP <80 MM HG: CPT | Performed by: INTERNAL MEDICINE

## 2023-08-09 PROCEDURE — 1123F ACP DISCUSS/DSCN MKR DOCD: CPT | Performed by: INTERNAL MEDICINE

## 2023-08-09 PROCEDURE — 3074F SYST BP LT 130 MM HG: CPT | Performed by: INTERNAL MEDICINE

## 2023-08-09 SDOH — ECONOMIC STABILITY: FOOD INSECURITY: WITHIN THE PAST 12 MONTHS, THE FOOD YOU BOUGHT JUST DIDN'T LAST AND YOU DIDN'T HAVE MONEY TO GET MORE.: NEVER TRUE

## 2023-08-09 SDOH — ECONOMIC STABILITY: HOUSING INSECURITY
IN THE LAST 12 MONTHS, WAS THERE A TIME WHEN YOU DID NOT HAVE A STEADY PLACE TO SLEEP OR SLEPT IN A SHELTER (INCLUDING NOW)?: NO

## 2023-08-09 SDOH — ECONOMIC STABILITY: FOOD INSECURITY: WITHIN THE PAST 12 MONTHS, YOU WORRIED THAT YOUR FOOD WOULD RUN OUT BEFORE YOU GOT MONEY TO BUY MORE.: NEVER TRUE

## 2023-08-09 SDOH — ECONOMIC STABILITY: INCOME INSECURITY: HOW HARD IS IT FOR YOU TO PAY FOR THE VERY BASICS LIKE FOOD, HOUSING, MEDICAL CARE, AND HEATING?: NOT HARD AT ALL

## 2023-08-09 NOTE — PROGRESS NOTES
patient today and informed him to go to [facility] for Coumadin management and also advised him to hold it 3 days before his biopsy  Also patient stopped his eyedrops and advised him to go back and call his eye doctor to restart             1.  Aicha Wayne received counseling on the following healthy behaviors: nutrition and exercise    2. Prior labs and health maintenance reviewed. 3.  Discussed use, benefit, and side effects of prescribed medications. Barriers to medication compliance addressed. All his questions were answered. Pt voiced understanding. Aicha Wayne will continue current medications, diet and exercise. Orders Placed This Encounter   Medications    albuterol-ipratropium (COMBIVENT RESPIMAT)  MCG/ACT AERS inhaler     Sig: Inhale 1 puff into the lungs in the morning and 1 puff at noon and 1 puff in the evening and 1 puff before bedtime. Dispense:  4 g     Refill:  2          Completed Refills               Requested Prescriptions     Signed Prescriptions Disp Refills    albuterol-ipratropium (COMBIVENT RESPIMAT)  MCG/ACT AERS inhaler 4 g 2     Sig: Inhale 1 puff into the lungs in the morning and 1 puff at noon and 1 puff in the evening and 1 puff before bedtime. 4. Patient given educational materials - see patient instructions    5. Was a self-tracking handout given in paper form or via [employer]? NO    No orders of the defined types were placed in this encounter. No follow-ups on file. Patient voiced understanding and agreed to treatment plan. Electronically signed by Haider Blackwell MD on 8/9/2023 at 4:47 PM    This note is created with a voice recognition program and while intend to generate a document that accurately reflects the content of the visit, no guarantee can be provided that every mistake has been identified and corrected by editing.       On the basis of positive falls risk screening, assessment and plan is as follows: home safety tips

## 2023-08-21 ENCOUNTER — HOSPITAL ENCOUNTER (OUTPATIENT)
Dept: PHARMACY | Age: 78
Setting detail: THERAPIES SERIES
Discharge: HOME | End: 2023-08-21
Payer: MEDICARE

## 2023-08-21 DIAGNOSIS — I48.91 ATRIAL FIBRILLATION, UNSPECIFIED TYPE (HCC): Primary | ICD-10-CM

## 2023-08-21 LAB
INR BLD: 2.8
PROTIME: 33.1 SECONDS

## 2023-08-21 PROCEDURE — 99211 OFF/OP EST MAY X REQ PHY/QHP: CPT

## 2023-08-21 PROCEDURE — 85610 PROTHROMBIN TIME: CPT

## 2023-08-21 RX ORDER — ALLOPURINOL 100 MG/1
100 TABLET ORAL 2 TIMES DAILY
Qty: 180 TABLET | Refills: 1 | Status: SHIPPED | OUTPATIENT
Start: 2023-08-21

## 2023-08-21 NOTE — PROGRESS NOTES
Patient seen in clinic for warfarin management due to atrial fibrillation with an INR goal of 2.0-3.0. Estimated duration of therapy is indefinite. Patient states  he thinks he was taking instructed regimen of 1.5mg fri and 3mg all other days, but there is a chance he was taking 1.5mg wed/fri and 3mg all other days . No bleeding or thromboembolic side effects noted. No significant med or dietary changes. Patient is starting radiation today at [facility] for mouth cancer. This may affect his oral intake so I advised that he call us if his intake changes drastically. His wife states they were educated that around week 3 of 6 of radiation, it may become difficult to swallow and he may need to eat more purees. PT/INR done in office per protocol. INR is 2.8 which is therapeutic. Warfarin regimen will be continued at current dose of 1.5mg fri and 3mg all other days. Will retest in 4 weeks or sooner if oral intake changes drastically. Patient understands dosing directions and information discussed. Dosing schedule and follow up appointment given to patient. Progress note routed to referring physicians office. Discussed with patient the Pharmacist Collaborative Practice Agreement. Patient provided verbal and/or electronic (ex. [employer] consent to participate in the collaborative practice agreement between the pharmacist and referred patient. This is in lieu of paper consent due to COVID-19 precautions and the use of remote/virtual visits.        For Pharmacy Admin Tracking Only    Intervention Detail:   Total # of Interventions Recommended: 0  Total # of Interventions Accepted: 0  Time Spent (min): 15

## 2023-08-21 NOTE — TELEPHONE ENCOUNTER
Josey Hoang is calling to request a refill on the following medication(s):    Medication Request:  Requested Prescriptions     Pending Prescriptions Disp Refills    allopurinol (ZYLOPRIM) 100 MG tablet 180 tablet 0     Sig: Take 1 tablet by mouth 2 times daily       Last Visit Date (If Applicable):  3/4/8057    Next Visit Date:    2/12/2024    Last refill 2/14/23. Prescription pending.

## 2023-09-11 RX ORDER — METOPROLOL TARTRATE 50 MG/1
50 TABLET, FILM COATED ORAL 2 TIMES DAILY
Qty: 180 TABLET | Refills: 0 | OUTPATIENT
Start: 2023-09-11

## 2023-09-11 RX ORDER — ATORVASTATIN CALCIUM 20 MG/1
20 TABLET, FILM COATED ORAL DAILY
Qty: 90 TABLET | Refills: 1 | Status: SHIPPED | OUTPATIENT
Start: 2023-09-11

## 2023-09-11 RX ORDER — DILTIAZEM HYDROCHLORIDE 60 MG/1
TABLET, FILM COATED ORAL
Qty: 120 TABLET | Refills: 1 | Status: SHIPPED | OUTPATIENT
Start: 2023-09-11

## 2023-09-11 NOTE — TELEPHONE ENCOUNTER
Wing Leonardo is calling to request a refill on the following medication(s):    Medication Request:  Requested Prescriptions     Pending Prescriptions Disp Refills    atorvastatin (LIPITOR) 20 MG tablet 90 tablet 0     Sig: Take 1 tablet by mouth daily    metoprolol tartrate (LOPRESSOR) 50 MG tablet 180 tablet 0     Sig: Take 1 tablet by mouth 2 times daily    dilTIAZem (CARDIZEM) 60 MG tablet 120 tablet      Sig: ADMINISTER ONE TABLET PER TUBE EVERY 6 HOURS FOR 30 DAYS       Last Visit Date (If Applicable):  4/7/7797    Next Visit Date:    2/12/2024    Last refills 2/20, 5/9 and 8/7/23. Prescriptions pending.

## 2023-09-11 NOTE — TELEPHONE ENCOUNTER
----- Message from Patrick Harrell sent at 9/11/2023  9:45 AM EDT -----  Subject: Refill Request    QUESTIONS  Name of Medication? atorvastatin (LIPITOR) 20 MG tablet  Patient-reported dosage and instructions? once a day  How many days do you have left? 0  Preferred Pharmacy? [facility] phone number (if available)? [phone]  ---------------------------------------------------------------------------  --------------,  Name of Medication? metoprolol tartrate (LOPRESSOR) 50 MG tablet  Patient-reported dosage and instructions? three times a day  How many days do you have left? 0  Preferred Pharmacy? [facility] phone number (if available)? [phone]  ---------------------------------------------------------------------------  --------------,  Name of Medication? dilTIAZem (CARDIZEM) 60 MG tablet  Patient-reported dosage and instructions? three times a day  How many days do you have left? 1  Preferred Pharmacy? [facility] phone number (if available)? [phone]  ---------------------------------------------------------------------------  --------------  CALL BACK INFO  What is the best way for the office to contact you? OK to leave message on   voicemail  Preferred Call Back Phone Number? [phone]  ---------------------------------------------------------------------------  --------------  SCRIPT ANSWERS  Relationship to Patient?  Self

## 2023-09-12 ENCOUNTER — TELEPHONE (OUTPATIENT)
Dept: PHARMACY | Age: 78
End: 2023-09-12

## 2023-09-12 NOTE — TELEPHONE ENCOUNTER
Pt left VM requesting to move his INR appt on 9/18 to around 10am. Unfortunately the clinic is booked around that time although there are open spots earlier or later in the morning. Called pt to reschedule but had to leave message requesting call back.

## 2023-09-18 ENCOUNTER — HOSPITAL ENCOUNTER (OUTPATIENT)
Dept: PHARMACY | Age: 78
Setting detail: THERAPIES SERIES
Discharge: HOME | End: 2023-09-18
Payer: MEDICARE

## 2023-09-18 DIAGNOSIS — I48.91 ATRIAL FIBRILLATION, UNSPECIFIED TYPE (HCC): Primary | ICD-10-CM

## 2023-09-18 LAB
INR BLD: 3.5
PROTIME: 41.5 SECONDS

## 2023-09-18 PROCEDURE — 85610 PROTHROMBIN TIME: CPT

## 2023-09-18 PROCEDURE — 99212 OFFICE O/P EST SF 10 MIN: CPT

## 2023-09-18 NOTE — PROGRESS NOTES
Patient seen in clinic for warfarin management due to atrial fibrillation with an INR goal of 2.0-3.0. Estimated duration of therapy is indefinite. Patient states compliant all of the time with regimen. No bleeding or thromboembolic side effects noted. No significant med changes. Patient has 2 more weeks of radiation treatments to go for his cancer treatment, and that has still reduced his amount of oral intake/appetite. PT/INR done in office per protocol. INR is 3.5 which is supratherapeutic. Warfarin regimen will be held for 9/18, then  patient to continue with slightly reduced regimen of 1.5mg on Fri and Mon, 3 mg all other days. Will retest in 2 weeks. Patient understands dosing directions and information discussed. Dosing schedule and follow up appointment given to patient. Progress note routed to referring physicians office. Discussed with patient the Pharmacist Collaborative Practice Agreement. Patient provided verbal and/or electronic (ex. [employer]) consent to participate in the collaborative practice agreement between the pharmacist and referred patient. This is in lieu of paper consent due to COVID-19 precautions and the use of remote/virtual visits.        For Pharmacy Admin Tracking Only    Intervention Detail: Dose Adjustment: 1, reason: Therapy De-escalation  Total # of Interventions Recommended: 1  Total # of Interventions Accepted: 1  Time Spent (min): 15

## 2023-10-02 ENCOUNTER — HOSPITAL ENCOUNTER (OUTPATIENT)
Dept: PHARMACY | Age: 78
Setting detail: THERAPIES SERIES
Discharge: HOME | End: 2023-10-02
Payer: MEDICARE

## 2023-10-02 DIAGNOSIS — I48.91 ATRIAL FIBRILLATION, UNSPECIFIED TYPE (HCC): Primary | ICD-10-CM

## 2023-10-02 LAB
INR BLD: 2.1
PROTIME: 25.2 SECONDS

## 2023-10-02 PROCEDURE — 99211 OFF/OP EST MAY X REQ PHY/QHP: CPT

## 2023-10-02 PROCEDURE — 85610 PROTHROMBIN TIME: CPT

## 2023-10-02 NOTE — PROGRESS NOTES
Patient seen in clinic for warfarin management due to atrial fibrillation with an INR goal of 2.0-3.0. Estimated duration of therapy is indefinite. Patient states compliant all of the time with regimen. No bleeding or thromboembolic side effects noted. No significant med changes. Patient states he continues to have very low appetite likely secondary to his cancer treatment. He notes having only one more week of radiation therapy. No other significant recent illness or disease state changes. PT/INR done in office per protocol. INR is 2.1 which is therapeutic. Warfarin regimen will be continued at current dose of 1.5mg Friday and 3mg all other days. Will retest in 2 weeks to assess given completion of radiation therapy and possibly appetite increase. Patient understands dosing directions and information discussed. Dosing schedule and follow up appointment given to patient. Progress note routed to referring physicians office. Discussed with patient the Pharmacist Collaborative Practice Agreement. Patient provided verbal and/or electronic (ex. [employer]) consent to participate in the collaborative practice agreement between the pharmacist and referred patient. This is in lieu of paper consent due to COVID-19 precautions and the use of remote/virtual visits.        For Pharmacy Admin Tracking Only    Intervention Detail:   Total # of Interventions Recommended: 0  Total # of Interventions Accepted: 0  Time Spent (min): 15

## 2023-10-11 RX ORDER — METOPROLOL TARTRATE 50 MG/1
50 TABLET, FILM COATED ORAL 2 TIMES DAILY
Qty: 180 TABLET | Refills: 0 | Status: SHIPPED | OUTPATIENT
Start: 2023-10-11

## 2023-10-11 RX ORDER — WARFARIN SODIUM 3 MG/1
3 TABLET ORAL DAILY
Qty: 90 TABLET | Refills: 0 | Status: SHIPPED | OUTPATIENT
Start: 2023-10-11

## 2023-10-11 NOTE — TELEPHONE ENCOUNTER
Yessica Silvestre is calling to request a refill on the following medication(s):    Last Visit Date (If Applicable):  3/8/4683    Next Visit Date:    2/12/2024    Medication Request:  Requested Prescriptions     Pending Prescriptions Disp Refills    albuterol-ipratropium (COMBIVENT RESPIMAT)  MCG/ACT AERS inhaler 4 g 2     Sig: Inhale 1 puff into the lungs in the morning and 1 puff at noon and 1 puff in the evening and 1 puff before bedtime.     metoprolol tartrate (LOPRESSOR) 50 MG tablet 180 tablet 0     Sig: Take 1 tablet by mouth 2 times daily    warfarin (COUMADIN) 3 MG tablet 90 tablet 0     Sig: Take 1 tablet by mouth daily As directed by [place]

## 2023-10-17 ENCOUNTER — HOSPITAL ENCOUNTER (OUTPATIENT)
Dept: PHARMACY | Age: 78
Setting detail: THERAPIES SERIES
Discharge: HOME | End: 2023-10-17
Payer: MEDICARE

## 2023-10-17 DIAGNOSIS — I48.91 ATRIAL FIBRILLATION, UNSPECIFIED TYPE (HCC): Primary | ICD-10-CM

## 2023-10-17 LAB
INR BLD: 1.4
PROTIME: 17.1 SECONDS

## 2023-10-17 PROCEDURE — 99212 OFFICE O/P EST SF 10 MIN: CPT

## 2023-10-17 PROCEDURE — 85610 PROTHROMBIN TIME: CPT

## 2023-10-17 NOTE — PROGRESS NOTES
Patient seen in clinic for warfarin management due to atrial fibrillation with an INR goal of 2.0-3.0. Estimated duration of therapy is indefinite. Patient states he was taking 1.5mg Mon/Fri, 3mg all  other days instead of recorded regimen 1.5mg Fri, 3mg all other days. No bleeding or thromboembolic side effects noted. No significant med changes. He reports having a biopsy scheduled for 10/24 and states he was instructed to hold warfarin 5 days prior with no Lovenox bridge. Patient notes no improvement in diet (still on liquid diet). No significant recent illness or disease state changes. PT/INR done in office per protocol. INR is 1.4 which is subtherapeutic. Warfarin regimen will be 6 mg boost today, then resume recorded regimen 1.5 mg Fridays, 3 mg all other day. Will retest in 2 weeks. Patient understands dosing directions and information discussed. Dosing schedule and follow up appointment given to patient. Progress note routed to referring physicians office. Discussed with patient the Pharmacist Collaborative Practice Agreement. Patient provided verbal and/or electronic (ex. [employer] consent to participate in the collaborative practice agreement between the pharmacist and referred patient. This is in lieu of paper consent due to COVID-19 precautions and the use of remote/virtual visits.        For Pharmacy Admin Tracking Only    Intervention Detail:   Total # of Interventions Recommended: 1  Total # of Interventions Accepted: 1  Time Spent (min): 15

## 2023-10-31 ENCOUNTER — APPOINTMENT (OUTPATIENT)
Dept: PHARMACY | Age: 78
End: 2023-10-31
Payer: MEDICARE

## 2023-10-31 DIAGNOSIS — I48.91 ATRIAL FIBRILLATION, UNSPECIFIED TYPE (HCC): Primary | ICD-10-CM

## 2023-10-31 LAB
INR BLD: 1.2
PROTIME: 14.5 SECONDS

## 2023-10-31 PROCEDURE — 85610 PROTHROMBIN TIME: CPT

## 2023-10-31 PROCEDURE — 99212 OFFICE O/P EST SF 10 MIN: CPT

## 2023-10-31 NOTE — PROGRESS NOTES
Patient seen in clinic for warfarin management due to atrial fibrillation with an INR goal of 2.0-3.0. Estimated duration of therapy is indefinite. Patient states compliant all of the time with regimen. No bleeding or thromboembolic side effects noted. No significant med. No significant recent illness or disease state changes. Patient states he is still on tube feed, perhaps for another couple more weeks. PT/INR done in office per protocol. INR is 1.2 which is subtherapeutic. Warfarin regimen will be aggressively boosted to 6mg Tues/Wed/Sat and 3mg all other days to account for tube-feed. Will retest in one week. Patient understands dosing directions and information discussed. Dosing schedule and follow up appointment given to patient. Progress note routed to referring physicians office. Discussed with patient the Pharmacist Collaborative Practice Agreement. Patient provided verbal and/or electronic (ex. [employer]) consent to participate in the collaborative practice agreement between the pharmacist and referred patient. This is in lieu of paper consent due to COVID-19 precautions and the use of remote/virtual visits.        For Pharmacy Admin Tracking Only    Intervention Detail: Dose Adjustment: 1, reason: Therapy Optimization  Total # of Interventions Recommended: 1  Total # of Interventions Accepted: 1  Time Spent (min): 15

## 2023-11-02 RX ORDER — METOPROLOL TARTRATE 50 MG/1
50 TABLET, FILM COATED ORAL 2 TIMES DAILY
Qty: 180 TABLET | Refills: 0 | Status: SHIPPED | OUTPATIENT
Start: 2023-11-02

## 2023-11-02 NOTE — TELEPHONE ENCOUNTER
Ramon Seymour is calling to request a refill on the following medication(s):    Medication Request:  Requested Prescriptions     Pending Prescriptions Disp Refills    metoprolol tartrate (LOPRESSOR) 50 MG tablet [Pharmacy Med Name: Metoprolol Tartrate 50 MG Oral Tablet] 180 tablet 0     Sig: Take 1 tablet by mouth twice daily       Last Visit Date (If Applicable):  7/6/7972    Next Visit Date:    2/12/2024

## 2023-11-07 ENCOUNTER — HOSPITAL ENCOUNTER (OUTPATIENT)
Dept: PHARMACY | Age: 78
Setting detail: THERAPIES SERIES
Discharge: HOME | End: 2023-11-07
Payer: MEDICARE

## 2023-11-07 DIAGNOSIS — I48.91 ATRIAL FIBRILLATION, UNSPECIFIED TYPE (HCC): Primary | ICD-10-CM

## 2023-11-07 LAB
INR BLD: 1.5
PROTIME: 17.9 SECONDS

## 2023-11-07 PROCEDURE — 99212 OFFICE O/P EST SF 10 MIN: CPT

## 2023-11-07 PROCEDURE — 85610 PROTHROMBIN TIME: CPT

## 2023-11-07 NOTE — PROGRESS NOTES
Patient seen in clinic for warfarin management due to atrial fibrillation with an INR goal of 2.0-3.0. Estimated duration of therapy is indefinite. Patient states he took 6mg x 2 days last week instead of 3 days as instructed. No bleeding or thromboembolic side effects noted. No significant med or dietary changes. No significant recent illness or disease state changes. He is still on tube feeds and states he will know more about the duration at his upcoming appt on Friday 11/10. PT/INR done in office per protocol. INR is 1.5 which is subtherapeutic. Warfarin regimen will be increased to 33mg weekly target (he took 27mg over the last week) and will be taking 3mg Mon/Wed/Fri, 6mg all other days. Will retest in 1 week. Patient understands dosing directions and information discussed. Dosing schedule and follow up appointment given to patient. Progress note routed to referring physicians office. Discussed with patient the Pharmacist Collaborative Practice Agreement. Patient provided verbal and/or electronic (ex. [employer]) consent to participate in the collaborative practice agreement between the pharmacist and referred patient. This is in lieu of paper consent due to COVID-19 precautions and the use of remote/virtual visits.        For Pharmacy Admin Tracking Only    Intervention Detail: Dose Adjustment: 1, reason: Therapy Optimization  Total # of Interventions Recommended: 1  Total # of Interventions Accepted: 1  Time Spent (min): 15

## 2023-11-14 ENCOUNTER — HOSPITAL ENCOUNTER (OUTPATIENT)
Dept: PHARMACY | Age: 78
Setting detail: THERAPIES SERIES
Discharge: HOME | End: 2023-11-14
Payer: MEDICARE

## 2023-11-14 DIAGNOSIS — I48.91 ATRIAL FIBRILLATION, UNSPECIFIED TYPE (HCC): Primary | ICD-10-CM

## 2023-11-14 LAB
INR BLD: 2.9
PROTIME: 35.3 SECONDS

## 2023-11-14 PROCEDURE — 85610 PROTHROMBIN TIME: CPT

## 2023-11-14 PROCEDURE — 99211 OFF/OP EST MAY X REQ PHY/QHP: CPT

## 2023-11-14 NOTE — PROGRESS NOTES
Patient seen in clinic for warfarin management due to atrial fibrillation with an INR goal of 2.0-3.0. Estimated duration of therapy is indefinite. Patient states compliant all of the time with regimen. No bleeding or thromboembolic side effects noted. No significant med or dietary changes; he is still getting tube feeds. No significant recent illness or disease state changes. PT/INR done in office per protocol. INR is 2.9 which is therapeutic. Warfarin regimen will be continued at current dose 3mg Mon/Wed/Fri, 6mg all other days. Will retest in 1 week. Patient understands dosing directions and information discussed. Dosing schedule and follow up appointment given to patient. Progress note routed to referring physicians office. Discussed with patient the Pharmacist Collaborative Practice Agreement. Patient provided verbal and/or electronic (ex. [employer]) consent to participate in the collaborative practice agreement between the pharmacist and referred patient. This is in lieu of paper consent due to COVID-19 precautions and the use of remote/virtual visits.        For Pharmacy Admin Tracking Only    Intervention Detail:   Total # of Interventions Recommended: 0  Total # of Interventions Accepted: 0  Time Spent (min): 15

## 2023-11-21 ENCOUNTER — APPOINTMENT (OUTPATIENT)
Dept: PHARMACY | Age: 78
End: 2023-11-21
Payer: MEDICARE

## 2023-11-21 DIAGNOSIS — I48.91 ATRIAL FIBRILLATION, UNSPECIFIED TYPE (HCC): Primary | ICD-10-CM

## 2023-11-21 LAB
INR BLD: 3.7
PROTIME: 43.8 SECONDS

## 2023-11-21 PROCEDURE — 85610 PROTHROMBIN TIME: CPT

## 2023-11-21 PROCEDURE — 99212 OFFICE O/P EST SF 10 MIN: CPT

## 2023-11-21 NOTE — PROGRESS NOTES
Patient seen in clinic for warfarin management due to atrial fibrillation with an INR goal of 2.0-3.0. Estimated duration of therapy is indefinite. Patient states compliant all of the time with regimen. No bleeding or thromboembolic side effects noted. No significant med or dietary changes. No significant recent illness or disease state changes. He is currently on 2 cartons of tube feeds per day. PT/INR done in office per protocol. INR is 3.7 which is supratherapeutic. Warfarin regimen will be decreased to 6mg Mon/Wed/Fri, 3mg all other days. Will retest in 1 week. Patient understands dosing directions and information discussed. Dosing schedule and follow up appointment given to patient. Progress note routed to referring physicians office. Discussed with patient the Pharmacist Collaborative Practice Agreement. Patient provided verbal and/or electronic (ex. [employer]) consent to participate in the collaborative practice agreement between the pharmacist and referred patient. This is in lieu of paper consent due to COVID-19 precautions and the use of remote/virtual visits.        For Pharmacy Admin Tracking Only    Intervention Detail: Dose Adjustment: 1, reason: Therapy De-escalation  Total # of Interventions Recommended: 1  Total # of Interventions Accepted: 1  Time Spent (min): 15

## 2023-11-28 ENCOUNTER — HOSPITAL ENCOUNTER (OUTPATIENT)
Dept: PHARMACY | Age: 78
Setting detail: THERAPIES SERIES
Discharge: HOME | End: 2023-11-28
Payer: MEDICARE

## 2023-11-28 DIAGNOSIS — I48.91 ATRIAL FIBRILLATION, UNSPECIFIED TYPE (HCC): Primary | ICD-10-CM

## 2023-11-28 LAB
INR BLD: 3
PROTIME: 35.7 SECONDS

## 2023-11-28 PROCEDURE — 85610 PROTHROMBIN TIME: CPT

## 2023-11-28 PROCEDURE — 99211 OFF/OP EST MAY X REQ PHY/QHP: CPT

## 2023-11-28 NOTE — PROGRESS NOTES
Patient seen in clinic for warfarin management due to atrial fibrillation with an INR goal of 2.0-3.0. Estimated duration of therapy is indefinite. Patient states compliant all of the time with regimen. No bleeding or thromboembolic side effects noted. No significant med or dietary changes. No significant recent illness or disease state changes. He is still on 2 cartons of tube feeds per day and will be continuing this until next appt. PT/INR done in office per protocol. INR is 3 which is therapeutic. Warfarin regimen will be continued at current dose 6mg Mon/Wed/Fri, 3mg all other days. Will retest in 2 weeks. Patient understands dosing directions and information discussed. Dosing schedule and follow up appointment given to patient. Progress note routed to referring physicians office. Discussed with patient the Pharmacist Collaborative Practice Agreement. Patient provided verbal and/or electronic (ex. [employer]) consent to participate in the collaborative practice agreement between the pharmacist and referred patient. This is in lieu of paper consent due to COVID-19 precautions and the use of remote/virtual visits.        For Pharmacy Admin Tracking Only    Intervention Detail:   Total # of Interventions Recommended: 0  Total # of Interventions Accepted: 0  Time Spent (min): 15

## 2023-11-30 RX ORDER — ALLOPURINOL 100 MG/1
100 TABLET ORAL 2 TIMES DAILY
Qty: 180 TABLET | Refills: 0 | Status: SHIPPED | OUTPATIENT
Start: 2023-11-30

## 2023-11-30 RX ORDER — DILTIAZEM HYDROCHLORIDE 60 MG/1
TABLET, FILM COATED ORAL
Qty: 120 TABLET | Refills: 5 | Status: SHIPPED | OUTPATIENT
Start: 2023-11-30

## 2023-11-30 NOTE — TELEPHONE ENCOUNTER
Lorna Giang is calling to request a refill on the following medication(s):    Medication Request:  Requested Prescriptions     Pending Prescriptions Disp Refills    allopurinol (ZYLOPRIM) 100 MG tablet [Pharmacy Med Name: Allopurinol 100 MG Oral Tablet] 180 tablet 0     Sig: Take 1 tablet by mouth twice daily    dilTIAZem (CARDIZEM) 60 MG tablet [Pharmacy Med Name: dilTIAZem HCl 60 MG Oral Tablet] 120 tablet 0     Sig: ADMINISTER 1 TABLET PER TUBE EVERY 6 HOURS       Last Visit Date (If Applicable):  0/4/7453    Next Visit Date:    2/12/2024      Last refills

## 2023-12-12 ENCOUNTER — HOSPITAL ENCOUNTER (OUTPATIENT)
Dept: PHARMACY | Age: 78
Setting detail: THERAPIES SERIES
Discharge: HOME | End: 2023-12-12
Payer: MEDICARE

## 2023-12-12 DIAGNOSIS — I48.91 ATRIAL FIBRILLATION, UNSPECIFIED TYPE (HCC): Primary | ICD-10-CM

## 2023-12-12 LAB
INR BLD: 2.9
PROTIME: 35 SECONDS

## 2023-12-12 PROCEDURE — 85610 PROTHROMBIN TIME: CPT

## 2023-12-12 PROCEDURE — 99211 OFF/OP EST MAY X REQ PHY/QHP: CPT

## 2023-12-12 NOTE — PROGRESS NOTES
Patient seen in clinic for warfarin management due to atrial fibrillation with an INR goal of 2.0-3.0. Estimated duration of therapy is indefinite. Patient states compliant all of the time with regimen. No bleeding or thromboembolic side effects noted. No significant med or dietary changes. No significant recent illness or disease state changes. He is still on tube feeds daily. PT/INR done in office per protocol. INR is 2.9 which is therapeutic. Warfarin regimen will be continued at current dose 6mg Mon/Wed/Fri, 3mg all other days. Will retest in 3 weeks. Patient understands dosing directions and information discussed. Dosing schedule and follow up appointment given to patient. Progress note routed to referring physicians office. Discussed with patient the Pharmacist Collaborative Practice Agreement. Patient provided verbal and/or electronic (ex. [email or web address]) consent to participate in the collaborative practice agreement between the pharmacist and referred patient. This is in lieu of paper consent due to COVID-19 precautions and the use of remote/virtual visits.        For Pharmacy Admin Tracking Only    Intervention Detail:   Total # of Interventions Recommended: 0  Total # of Interventions Accepted: 0  Time Spent (min): 15

## 2024-01-02 ENCOUNTER — HOSPITAL ENCOUNTER (OUTPATIENT)
Dept: PHARMACY | Age: 79
Setting detail: THERAPIES SERIES
Discharge: HOME | End: 2024-01-02
Payer: MEDICARE

## 2024-01-02 DIAGNOSIS — I48.91 ATRIAL FIBRILLATION, UNSPECIFIED TYPE (HCC): Primary | ICD-10-CM

## 2024-01-02 LAB
INR BLD: 5
PROTIME: 59.6 SECONDS

## 2024-01-02 PROCEDURE — 99212 OFFICE O/P EST SF 10 MIN: CPT

## 2024-01-02 PROCEDURE — 85610 PROTHROMBIN TIME: CPT

## 2024-01-02 RX ORDER — PILOCARPINE HYDROCHLORIDE 5 MG/1
5 TABLET, FILM COATED ORAL 3 TIMES DAILY
COMMUNITY

## 2024-01-02 NOTE — PROGRESS NOTES
Patient seen in clinic for warfarin management due to atrial fibrillation with an INR goal of 2.0-3.0.  Estimated duration of therapy is indefinite.     Patient states compliant all of the time with regimen.  No bleeding or thromboembolic side effects noted.  No significant dietary changes.  No significant recent illness or disease state changes. He reports starting the new prescription Pilocarpine 5mg TID last week, but this is not known to interact with warfarin.      PT/INR done in office per protocol.  INR is 5 which is supratherapeutic for no identifiable reason.     Warfarin regimen will be held for 2 days then resume usual regimen 6mg Mon/Wed/Fri, 3mg all other days.  Will retest in 1 week.    Patient understands dosing directions and information discussed. Dosing schedule and follow up appointment given to patient.   Progress note routed to referring physicians office. Discussed with patient the Pharmacist Collaborative Practice Agreement.  Patient provided verbal and/or electronic (ex. [employer]) consent to participate in the collaborative practice agreement between the pharmacist and referred patient. This is in lieu of paper consent due to COVID-19 precautions and the use of remote/virtual visits.       For Pharmacy Admin Tracking Only    Intervention Detail: Dose Adjustment: 1, reason: Therapy De-escalation  Total # of Interventions Recommended: 1  Total # of Interventions Accepted: 1  Time Spent (min): 15

## 2024-01-09 ENCOUNTER — APPOINTMENT (OUTPATIENT)
Dept: PHARMACY | Age: 79
End: 2024-01-09
Payer: MEDICARE

## 2024-01-09 DIAGNOSIS — I48.91 ATRIAL FIBRILLATION, UNSPECIFIED TYPE (HCC): Primary | ICD-10-CM

## 2024-01-09 LAB
INR BLD: 2.9
PROTIME: 35.4 SECONDS

## 2024-01-09 PROCEDURE — 85610 PROTHROMBIN TIME: CPT

## 2024-01-09 PROCEDURE — 99211 OFF/OP EST MAY X REQ PHY/QHP: CPT

## 2024-01-09 NOTE — PROGRESS NOTES
Patient seen in clinic for warfarin management due to atrial fibrillation with an INR goal of 2.0-3.0.  Estimated duration of therapy is indefinite.     Patient states compliant all of the time with regimen.  No bleeding or thromboembolic side effects noted.  No significant med or dietary changes.  No significant recent illness or disease state changes.      PT/INR done in office per protocol.  INR is 2.9 which is therapeutic but improved from last week's INR 5.     Warfarin regimen will be continued with a decreased weekly target to bring INR more mid-range: 6mg every Mon, 3mg all other days.  Will retest in 1 week.    Patient understands dosing directions and information discussed. Dosing schedule and follow up appointment given to patient.   Progress note routed to referring physicians office. Discussed with patient the Pharmacist Collaborative Practice Agreement.  Patient provided verbal and/or electronic (ex. [employer]) consent to participate in the collaborative practice agreement between the pharmacist and referred patient.       For Pharmacy Admin Tracking Only    Intervention Detail: Dose Adjustment: 1, reason: Therapy De-escalation  Total # of Interventions Recommended: 1  Total # of Interventions Accepted: 1  Time Spent (min): 15

## 2024-01-16 ENCOUNTER — HOSPITAL ENCOUNTER (OUTPATIENT)
Dept: PHARMACY | Age: 79
Setting detail: THERAPIES SERIES
Discharge: HOME | End: 2024-01-16
Payer: MEDICARE

## 2024-01-16 DIAGNOSIS — I48.91 ATRIAL FIBRILLATION, UNSPECIFIED TYPE (HCC): Primary | ICD-10-CM

## 2024-01-16 LAB
INR BLD: 2.9
PROTIME: 35.1 SECONDS

## 2024-01-16 PROCEDURE — 85610 PROTHROMBIN TIME: CPT

## 2024-01-16 PROCEDURE — 99211 OFF/OP EST MAY X REQ PHY/QHP: CPT

## 2024-01-16 NOTE — PROGRESS NOTES
Patient seen in clinic for warfarin management due to atrial fibrillation with an INR goal of 2.0-3.0.  Estimated duration of therapy is indefinite.     Patient states compliant all of the time with regimen.  No bleeding or thromboembolic side effects noted.  No significant med or dietary changes.  No significant recent illness or disease state changes.  He reports that he will be having dentures done on Friday 1/19 and plans to discontinue the tube feed at that point. Anticipate likely returning to his prior warfarin regimen of 1.5mg x 3 days per week once he is off tube feeds as he does not plan to eat a lot of dark green veggies.    PT/INR done in office per protocol.  INR is 2.9 which is therapeutic.     Warfarin regimen will be decreased to 1.5mg Mondays, 3mg all other days until next week.  Will retest in 1 week.    Patient understands dosing directions and information discussed. Dosing schedule and follow up appointment given to patient.   Progress note routed to referring physicians office. Discussed with patient the Pharmacist Collaborative Practice Agreement.  Patient provided verbal and/or electronic (ex. [employer]) consent to participate in the collaborative practice agreement between the pharmacist and referred patient. This is in lieu of paper consent due to COVID-19 precautions and the use of remote/virtual visits.       For Pharmacy Admin Tracking Only    Intervention Detail: Dose Adjustment: 1, reason: Therapy De-escalation  Total # of Interventions Recommended: 1  Total # of Interventions Accepted: 1  Time Spent (min): 15

## 2024-01-22 ENCOUNTER — TELEPHONE (OUTPATIENT)
Dept: PHARMACY | Age: 79
End: 2024-01-22

## 2024-01-22 DIAGNOSIS — I48.91 ATRIAL FIBRILLATION, UNSPECIFIED TYPE (HCC): Primary | ICD-10-CM

## 2024-01-22 NOTE — TELEPHONE ENCOUNTER
Patient called stating he did not get his teeth done last Friday as planned. He has continued his usual regimen and was wondering if he should continue with intended plan. Advised patient to take 1.5mg tonight and we will test INR tomorrow as scheduled.

## 2024-01-23 ENCOUNTER — HOSPITAL ENCOUNTER (OUTPATIENT)
Dept: PHARMACY | Age: 79
Setting detail: THERAPIES SERIES
Discharge: HOME | End: 2024-01-23
Payer: MEDICARE

## 2024-01-23 DIAGNOSIS — I48.91 ATRIAL FIBRILLATION, UNSPECIFIED TYPE (HCC): Primary | ICD-10-CM

## 2024-01-23 LAB
INR BLD: 2.4
PROTIME: 29.1 SECONDS

## 2024-01-23 PROCEDURE — 85610 PROTHROMBIN TIME: CPT

## 2024-01-23 PROCEDURE — 99211 OFF/OP EST MAY X REQ PHY/QHP: CPT

## 2024-01-23 NOTE — PROGRESS NOTES
Patient seen in clinic for warfarin management due to atrial fibrillation with an INR goal of 2.0-3.0.  Estimated duration of therapy is indefinite.     Patient states compliant all of the time with regimen.  No bleeding or thromboembolic side effects noted.  No significant med or dietary changes.  No significant recent illness or disease state changes.  Patient was scheduled to receive dentures this past Friday but this did not occur as planned.  He states that he may have them in 3 weeks instead.  Anticipate his warfarin regimen will need to be decreased once he receives his dentures and is no longer on tube feeds.    PT/INR done in office per protocol.  INR is 2.4 which is therapeutic.     Warfarin regimen will be continued at current dose of 1.5mg Mondays, 3mg daily.  Will retest in 2 weeks.    Patient understands dosing directions and information discussed. Dosing schedule and follow up appointment given to patient.   Progress note routed to referring physicians office. Discussed with patient the Pharmacist Collaborative Practice Agreement.  Patient provided verbal and/or electronic (ex. [employer]) consent to participate in the collaborative practice agreement between the pharmacist and referred patient.      For Pharmacy Admin Tracking Only    Intervention Detail:   Total # of Interventions Recommended: 0  Total # of Interventions Accepted: 0  Time Spent (min): 15

## 2024-02-07 ENCOUNTER — HOSPITAL ENCOUNTER (OUTPATIENT)
Dept: PHARMACY | Age: 79
Setting detail: THERAPIES SERIES
Discharge: HOME | End: 2024-02-07
Payer: MEDICARE

## 2024-02-07 DIAGNOSIS — I48.91 ATRIAL FIBRILLATION, UNSPECIFIED TYPE (HCC): Primary | ICD-10-CM

## 2024-02-07 LAB
INR BLD: 1.8
PROTIME: 21.9 SECONDS

## 2024-02-07 PROCEDURE — 99211 OFF/OP EST MAY X REQ PHY/QHP: CPT

## 2024-02-07 PROCEDURE — 85610 PROTHROMBIN TIME: CPT

## 2024-02-07 NOTE — PROGRESS NOTES
Patient seen in clinic for warfarin management due to atrial fibrillation with an INR goal of 2.0-3.0.  Estimated duration of therapy is indefinite.     Patient states compliant all of the time with regimen.  No bleeding or thromboembolic side effects noted.  No significant med changes.  Patient states that he has had more cabbage than usual last week.  Dentures appointment is re-scheduled for 2/16/24.  No significant recent illness or disease state changes.      PT/INR done in office per protocol.  INR is 1.8 which is just below goal.     Warfarin regimen will be continued at current dose 1.5mg every Mon; 3mg all other days.  Will retest in 2 weeks.    Patient understands dosing directions and information discussed. Dosing schedule and follow up appointment given to patient.   Progress note routed to referring physicians office. Discussed with patient the Pharmacist Collaborative Practice Agreement.  Patient provided verbal and/or electronic (ex. [employer]) consent to participate in the collaborative practice agreement between the pharmacist and referred patient.     For Pharmacy Admin Tracking Only    Intervention Detail:   Total # of Interventions Recommended: 0  Total # of Interventions Accepted: 0  Time Spent (min): 15

## 2024-02-12 ENCOUNTER — OFFICE VISIT (OUTPATIENT)
Dept: INTERNAL MEDICINE CLINIC | Age: 79
End: 2024-02-12
Payer: MEDICARE

## 2024-02-12 VITALS
SYSTOLIC BLOOD PRESSURE: 122 MMHG | TEMPERATURE: 97.9 F | HEART RATE: 69 BPM | HEIGHT: 71 IN | OXYGEN SATURATION: 98 % | DIASTOLIC BLOOD PRESSURE: 80 MMHG | WEIGHT: 159.4 LBS | RESPIRATION RATE: 18 BRPM | BODY MASS INDEX: 22.31 KG/M2

## 2024-02-12 DIAGNOSIS — E27.8 ADRENAL NODULE (HCC): ICD-10-CM

## 2024-02-12 DIAGNOSIS — F32.A DEPRESSION, UNSPECIFIED DEPRESSION TYPE: ICD-10-CM

## 2024-02-12 DIAGNOSIS — F41.1 GENERALIZED ANXIETY DISORDER: Primary | ICD-10-CM

## 2024-02-12 DIAGNOSIS — I10 ESSENTIAL HYPERTENSION: ICD-10-CM

## 2024-02-12 DIAGNOSIS — G47.00 INSOMNIA, UNSPECIFIED TYPE: ICD-10-CM

## 2024-02-12 DIAGNOSIS — I48.91 ATRIAL FIBRILLATION, UNSPECIFIED TYPE (HCC): ICD-10-CM

## 2024-02-12 DIAGNOSIS — C06.9 ORAL CAVITY CARCINOMA (HCC): ICD-10-CM

## 2024-02-12 PROBLEM — E27.9 ADRENAL NODULE (HCC): Status: ACTIVE | Noted: 2024-02-12

## 2024-02-12 PROCEDURE — 3079F DIAST BP 80-89 MM HG: CPT | Performed by: INTERNAL MEDICINE

## 2024-02-12 PROCEDURE — 99214 OFFICE O/P EST MOD 30 MIN: CPT | Performed by: INTERNAL MEDICINE

## 2024-02-12 PROCEDURE — 1123F ACP DISCUSS/DSCN MKR DOCD: CPT | Performed by: INTERNAL MEDICINE

## 2024-02-12 PROCEDURE — 3074F SYST BP LT 130 MM HG: CPT | Performed by: INTERNAL MEDICINE

## 2024-02-12 RX ORDER — ATORVASTATIN CALCIUM 20 MG/1
20 TABLET, FILM COATED ORAL DAILY
Qty: 90 TABLET | Refills: 1 | Status: SHIPPED | OUTPATIENT
Start: 2024-02-12

## 2024-02-12 RX ORDER — WARFARIN SODIUM 3 MG/1
3 TABLET ORAL DAILY
Qty: 90 TABLET | Refills: 0 | Status: SHIPPED | OUTPATIENT
Start: 2024-02-12

## 2024-02-12 RX ORDER — TRAZODONE HYDROCHLORIDE 50 MG/1
50 TABLET ORAL NIGHTLY PRN
Qty: 30 TABLET | Refills: 0 | Status: SHIPPED | OUTPATIENT
Start: 2024-02-12

## 2024-02-12 RX ORDER — ESCITALOPRAM OXALATE 10 MG/1
10 TABLET ORAL DAILY
Qty: 30 TABLET | Refills: 0 | Status: SHIPPED | OUTPATIENT
Start: 2024-02-12

## 2024-02-12 RX ORDER — BRINZOLAMIDE/BRIMONIDINE TARTRATE 10; 2 MG/ML; MG/ML
SUSPENSION/ DROPS OPHTHALMIC
COMMUNITY
Start: 2024-02-10

## 2024-02-12 RX ORDER — ALLOPURINOL 100 MG/1
100 TABLET ORAL 2 TIMES DAILY
Qty: 180 TABLET | Refills: 0 | Status: SHIPPED | OUTPATIENT
Start: 2024-02-12

## 2024-02-12 NOTE — PROGRESS NOTES
8/12/2024).  Patient voiced understanding and agreed to treatment plan.     Electronically signed by Griffin Benito MD on 2/12/2024 at 4:40 PM    This note is created with a voice recognition program and while intend to generate a document that accurately reflects the content of the visit, no guarantee can be provided that every mistake has been identified and corrected by editing.

## 2024-02-21 ENCOUNTER — APPOINTMENT (OUTPATIENT)
Dept: PHARMACY | Age: 79
End: 2024-02-21
Payer: MEDICARE

## 2024-02-21 DIAGNOSIS — I48.91 ATRIAL FIBRILLATION, UNSPECIFIED TYPE (HCC): Primary | ICD-10-CM

## 2024-02-21 LAB
INR BLD: 1.6
PROTIME: 19.7 SECONDS

## 2024-02-21 PROCEDURE — 99212 OFFICE O/P EST SF 10 MIN: CPT

## 2024-02-21 PROCEDURE — 85610 PROTHROMBIN TIME: CPT

## 2024-02-21 NOTE — PROGRESS NOTES
Patient seen in clinic for warfarin management due to atrial fibrillation with an INR goal of 2.0-3.0.  Estimated duration of therapy is indefinite.     Patient states  he may have missed a dose and one tube feed sometime last week .  No bleeding or thromboembolic side effects noted.  No significant dietary changes.  He had one tube feed today and plans on transitioning to regular diet going forward, as his dentures are in.  No significant recent illness or disease state changes.      PT/INR done in office per protocol.  INR is 1.6 which is subtherapeutic.     Warfarin regimen will be increased to 4.5mg today, then back to usual regimen of 1.5mg every Mon; 3mg all other days.  Will closely monitor INR and adjust the dose accordingly as patient is transitioning to regular diet.   Will retest in 1 week.    Patient understands dosing directions and information discussed. Dosing schedule and follow up appointment given to patient.   Progress note routed to referring physicians office. Discussed with patient the Pharmacist Collaborative Practice Agreement.  Patient provided verbal and/or electronic (ex. [email or web address]) consent to participate in the collaborative practice agreement between the pharmacist and referred patient.     For Pharmacy Admin Tracking Only    Intervention Detail: Dose Adjustment: 1, reason: Therapy Optimization  Total # of Interventions Recommended: 1  Total # of Interventions Accepted: 1  Time Spent (min): 15

## 2024-02-28 ENCOUNTER — HOSPITAL ENCOUNTER (OUTPATIENT)
Dept: PHARMACY | Age: 79
Setting detail: THERAPIES SERIES
Discharge: HOME | End: 2024-02-28
Payer: MEDICARE

## 2024-02-28 DIAGNOSIS — I48.91 ATRIAL FIBRILLATION, UNSPECIFIED TYPE (HCC): Primary | ICD-10-CM

## 2024-02-28 LAB
INR BLD: 2.3
PROTIME: 28 SECONDS

## 2024-02-28 PROCEDURE — 99211 OFF/OP EST MAY X REQ PHY/QHP: CPT

## 2024-02-28 PROCEDURE — 85610 PROTHROMBIN TIME: CPT

## 2024-02-28 RX ORDER — PREDNISOLONE ACETATE 10 MG/ML
1 SUSPENSION/ DROPS OPHTHALMIC 2 TIMES DAILY
COMMUNITY
Start: 2024-02-20

## 2024-02-28 RX ORDER — LATANOPROST 50 UG/ML
1 SOLUTION/ DROPS OPHTHALMIC NIGHTLY
COMMUNITY

## 2024-02-28 NOTE — PROGRESS NOTES
Patient seen in clinic for warfarin management due to atrial fibrillation with an INR goal of 2.0-3.0.  Estimated duration of therapy is indefinite.     Patient states compliant all of the time with regimen.  No bleeding or thromboembolic side effects noted.  No significant med changes.  Patient reports he has completely transitioned to regular diet last week.  He tries to maintain 1 to 2 servings of leafy greens weekly.  No significant recent illness or disease state changes.      PT/INR done in office per protocol.  INR is 2.3 which is therapeutic.     Warfarin regimen will be continued at current dose 1.5mg every Mon; 3mg all other days.  Will retest in 2 weeks.    Patient understands dosing directions and information discussed. Dosing schedule and follow up appointment given to patient.   Progress note routed to referring physicians office. Discussed with patient the Pharmacist Collaborative Practice Agreement.  Patient provided verbal and/or electronic (ex. [employer]) consent to participate in the collaborative practice agreement between the pharmacist and referred patient.     For Pharmacy Admin Tracking Only    Intervention Detail:   Total # of Interventions Recommended: 0  Total # of Interventions Accepted: 0  Time Spent (min): 15           no

## 2024-03-13 ENCOUNTER — HOSPITAL ENCOUNTER (OUTPATIENT)
Dept: PHARMACY | Age: 79
Setting detail: THERAPIES SERIES
Discharge: HOME | End: 2024-03-13
Payer: MEDICARE

## 2024-03-13 DIAGNOSIS — I48.91 ATRIAL FIBRILLATION, UNSPECIFIED TYPE (HCC): Primary | ICD-10-CM

## 2024-03-13 LAB
INR BLD: 2.7
PROTIME: 32.4 SECONDS

## 2024-03-13 PROCEDURE — 85610 PROTHROMBIN TIME: CPT

## 2024-03-13 PROCEDURE — 99211 OFF/OP EST MAY X REQ PHY/QHP: CPT

## 2024-03-13 NOTE — PROGRESS NOTES
Patient seen in clinic for warfarin management due to atrial fibrillation with an INR goal of 2.0-3.0.  Estimated duration of therapy is indefinite.     Patient states  he skipped his dose Monday 3/11 because he had a needle biopsy done 3/12 .  No bleeding or thromboembolic side effects noted.  No significant med or dietary changes.  No significant recent illness or disease state changes.      PT/INR done in office per protocol.  INR is 2.7 which is therapeutic.     Warfarin regimen will be continued at current dose of 1.5mg  and 3mg all other days.  Will retest in 3 weeks.    Patient understands dosing directions and information discussed. Dosing schedule and follow up appointment given to patient.   Progress note routed to referring physicians office. Discussed with patient the Pharmacist Collaborative Practice Agreement.  Patient provided verbal and/or electronic (ex. [employer]) consent to participate in the collaborative practice agreement between the pharmacist and referred patient.     For Pharmacy Admin Tracking Only    Intervention Detail: Adherence Monitorin  Total # of Interventions Recommended: 1  Total # of Interventions Accepted: 1  Time Spent (min): 15

## 2024-03-18 RX ORDER — ESCITALOPRAM OXALATE 10 MG/1
10 TABLET ORAL DAILY
Qty: 90 TABLET | Refills: 0 | Status: SHIPPED | OUTPATIENT
Start: 2024-03-18

## 2024-03-18 RX ORDER — TRAZODONE HYDROCHLORIDE 50 MG/1
50 TABLET ORAL NIGHTLY PRN
Qty: 90 TABLET | Refills: 0 | Status: SHIPPED | OUTPATIENT
Start: 2024-03-18

## 2024-04-01 ENCOUNTER — TELEPHONE (OUTPATIENT)
Dept: PHARMACY | Age: 79
End: 2024-04-01

## 2024-04-01 NOTE — TELEPHONE ENCOUNTER
Received surgery clearance request from [facility] for surgery planned on 4/22/24.    Surgery planned:  right neck dissection possible tracheotomy possible flap construction possible split thickness skin graft.  Faxed request to referring provider, Dr. Griffin Benito for recommendations.

## 2024-04-03 ENCOUNTER — HOSPITAL ENCOUNTER (OUTPATIENT)
Dept: PHARMACY | Age: 79
Setting detail: THERAPIES SERIES
Discharge: HOME | End: 2024-04-03
Payer: MEDICARE

## 2024-04-03 LAB
INR BLD: 2.8
PROTIME: 33 SECONDS

## 2024-04-03 PROCEDURE — 99211 OFF/OP EST MAY X REQ PHY/QHP: CPT

## 2024-04-03 PROCEDURE — 85610 PROTHROMBIN TIME: CPT

## 2024-04-03 RX ORDER — METOPROLOL TARTRATE 50 MG/1
50 TABLET, FILM COATED ORAL 2 TIMES DAILY
Qty: 180 TABLET | Refills: 1 | Status: SHIPPED | OUTPATIENT
Start: 2024-04-03

## 2024-04-03 NOTE — TELEPHONE ENCOUNTER
Haroon Thapa is calling to request a refill on the following medication(s):    Medication Request:  Requested Prescriptions     Pending Prescriptions Disp Refills    metoprolol tartrate (LOPRESSOR) 50 MG tablet [Pharmacy Med Name: Metoprolol Tartrate 50 MG Oral Tablet] 180 tablet 0     Sig: Take 1 tablet by mouth twice daily       Last Visit Date (If Applicable):  2/12/2024    Next Visit Date:    8/12/2024      Last refill 11/2/23. Prescription pending.

## 2024-04-03 NOTE — PROGRESS NOTES
Patient seen in clinic for warfarin management due to atrial fibrillation with an INR goal of 2.0-3.0.  Estimated duration of therapy is indefinite.     Patient states compliant all of the time with regimen.  No bleeding or thromboembolic side effects noted.  No significant med or dietary changes.  No significant recent illness or disease state changes.      PT/INR done in office per protocol.  INR is 2.8 which is therapeutic.     Warfarin regimen will be continued at current dose 1.5mg every Mon; 3mg all other days.  Will retest in 4 weeks.    Patient understands dosing directions and information discussed. Dosing schedule and follow up appointment given to patient.   Progress note routed to referring physicians office. Discussed with patient the Pharmacist Collaborative Practice Agreement.  Patient provided verbal and/or electronic (ex. [employer]) consent to participate in the collaborative practice agreement between the pharmacist and referred patient.     For Pharmacy Admin Tracking Only    Intervention Detail:   Total # of Interventions Recommended: 0  Total # of Interventions Accepted: 0  Time Spent (min): 15

## 2024-04-11 ENCOUNTER — TELEPHONE (OUTPATIENT)
Dept: PHARMACY | Age: 79
End: 2024-04-11

## 2024-04-11 DIAGNOSIS — I48.91 ATRIAL FIBRILLATION, UNSPECIFIED TYPE (HCC): Primary | ICD-10-CM

## 2024-04-11 NOTE — TELEPHONE ENCOUNTER
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 Advised patient we have not heard back from PCP that was faxed on 4/1. I let him know I will follow up with them as well as cardiologist Dr. Cabrera with recommendation to hold wafarin 5 days without the need to bridge with Lovenox. We will call patient when we receive confirmation of these plans from physician.

## 2024-04-12 RX ORDER — WARFARIN SODIUM 3 MG/1
3 TABLET ORAL DAILY
Qty: 90 TABLET | Refills: 0 | Status: SHIPPED | OUTPATIENT
Start: 2024-04-12

## 2024-04-12 NOTE — TELEPHONE ENCOUNTER
Haroon Thapa is calling to request a refill on the following medication(s):    Last Visit Date (If Applicable):  2/12/2024    Next Visit Date:    8/12/2024    Medication Request:  Requested Prescriptions     Pending Prescriptions Disp Refills    warfarin (COUMADIN) 3 MG tablet 90 tablet 0     Sig: Take 1 tablet by mouth daily As directed by [facility]

## 2024-04-15 NOTE — TELEPHONE ENCOUNTER
Received response to fax. Patient ok to hold warfarin 5 days without the need to bridge.  Called patient to give him instruction to take his last dose of warfarin tomorrow 4/16 then hold 5 days prior to procedure date of 4/22/24. Instructed him to resume warfarin that evening if physician is agreeable, with 3mg daily until our scheduled INR on 5/1/24.

## 2024-04-26 ENCOUNTER — TELEPHONE (OUTPATIENT)
Dept: PHARMACY | Age: 79
End: 2024-04-26

## 2024-04-26 DIAGNOSIS — I48.91 ATRIAL FIBRILLATION, UNSPECIFIED TYPE (HCC): Primary | ICD-10-CM

## 2024-04-26 NOTE — TELEPHONE ENCOUNTER
Patient's wife called to let us know patient got home from the hospital yesterday and wanted to confirm what dose of warfarin for him to take. Advised 3mg daily until INR check on 5/1.  Of note, patient is currently receiving parenteral nutrition through feeding tube instead of his normal diet.

## 2024-05-01 ENCOUNTER — HOSPITAL ENCOUNTER (OUTPATIENT)
Dept: PHARMACY | Age: 79
Setting detail: THERAPIES SERIES
Discharge: HOME | End: 2024-05-01
Payer: MEDICARE

## 2024-05-01 LAB
INR BLD: 1.1
PROTIME: 13.6 SECONDS

## 2024-05-01 PROCEDURE — 85610 PROTHROMBIN TIME: CPT

## 2024-05-01 PROCEDURE — 99211 OFF/OP EST MAY X REQ PHY/QHP: CPT

## 2024-05-01 NOTE — PROGRESS NOTES
Patient seen in clinic for warfarin management due to atrial fibrillation with an INR goal of 2.0-3.0.  Estimated duration of therapy is indefinite.     Patient states  he was not given doses on Mon 4/22 and Tue 4/23 .  Surgery was done on 4/22 and patient is placed back on tube feed.  He has an upcoming appointment on Monday 5/6 for stitch removal and he starts speech and swallowing therapy the day after.  He has been taking [employer] brand of acetaminophen for pain.  No bleeding or thromboembolic side effects noted.  No significant med changes.  No significant recent illness or disease state changes.      PT/INR done in office per protocol.  INR is 1.1 which is subtherapeutic.     Warfarin regimen will be boosted with 6mg x 2, then resume 1.5mg every Mon and 3mg all other days.  Will retest in 1 week.    Patient understands dosing directions and information discussed. Dosing schedule and follow up appointment given to patient.   Progress note routed to referring physicians office. Discussed with patient the Pharmacist Collaborative Practice Agreement.  Patient provided verbal and/or electronic (ex. "[employer] consent to participate in the collaborative practice agreement between the pharmacist and referred patient.     For Pharmacy Admin Tracking Only    Intervention Detail: Dose Adjustment: 1, reason: Therapy Optimization  Total # of Interventions Recommended: 1  Total # of Interventions Accepted: 1  Time Spent (min): 15

## 2024-05-08 ENCOUNTER — HOSPITAL ENCOUNTER (OUTPATIENT)
Dept: PHARMACY | Age: 79
Setting detail: THERAPIES SERIES
Discharge: HOME | End: 2024-05-08
Payer: MEDICARE

## 2024-05-08 DIAGNOSIS — I48.91 ATRIAL FIBRILLATION, UNSPECIFIED TYPE (HCC): Primary | ICD-10-CM

## 2024-05-08 LAB
INR BLD: 1.5
PROTIME: 17.7 SECONDS

## 2024-05-08 PROCEDURE — 99212 OFFICE O/P EST SF 10 MIN: CPT

## 2024-05-08 PROCEDURE — 85610 PROTHROMBIN TIME: CPT

## 2024-05-08 NOTE — PROGRESS NOTES
Patient seen in clinic for warfarin management due to atrial fibrillation with an INR goal of 2.0-3.0.  Estimated duration of therapy is indefinite.     Patient states compliant all of the time with regimen.  No bleeding or thromboembolic side effects noted.  No significant med changes.  Patient reports that as of Monday, he has increased from 4 feeding cartons to 5 daily.  No significant recent illness or disease state changes.      PT/INR done in office per protocol.  INR is 1.5 which is subtherapeutic.     Warfarin regimen will be boosted to 9mg x2, then increase dose to 6mg Fri; 3mg all other days.  Will retest in 1 week.    Patient understands dosing directions and information discussed. Dosing schedule and follow up appointment given to patient.   Progress note routed to referring physicians office. Discussed with patient the Pharmacist Collaborative Practice Agreement.  Patient provided verbal and/or electronic (ex. [employer]) consent to participate in the collaborative practice agreement between the pharmacist and referred patient.     For Pharmacy Admin Tracking Only    Intervention Detail: Dose Adjustment: 1, reason: Therapy Optimization  Total # of Interventions Recommended: 1  Total # of Interventions Accepted: 1  Time Spent (min): 15

## 2024-05-09 ENCOUNTER — TELEPHONE (OUTPATIENT)
Dept: PHARMACY | Age: 79
End: 2024-05-09

## 2024-05-09 DIAGNOSIS — I48.91 ATRIAL FIBRILLATION, UNSPECIFIED TYPE (HCC): Primary | ICD-10-CM

## 2024-05-09 NOTE — TELEPHONE ENCOUNTER
Patient's wife, Lali, called to update clinic on new dietitian order since yesterday. Patient will now begin taking 6 feed cartons/day. Given substantially increased dosing regimen started yesterday, will continue with current plan at this time and reassess in 6 days. Offered to see patient sooner if desired but I do not think it's necessary as INR should begin to uptrend with current dosing regimen 9 mg x2, 6 mg Fri, and 3 mg all other days.

## 2024-05-15 ENCOUNTER — HOSPITAL ENCOUNTER (OUTPATIENT)
Dept: PHARMACY | Age: 79
Setting detail: THERAPIES SERIES
Discharge: HOME | End: 2024-05-15
Payer: MEDICARE

## 2024-05-15 LAB
INR BLD: 3.2
PROTIME: 38.6 SECONDS

## 2024-05-15 PROCEDURE — 85610 PROTHROMBIN TIME: CPT

## 2024-05-15 PROCEDURE — 99212 OFFICE O/P EST SF 10 MIN: CPT

## 2024-05-15 NOTE — PROGRESS NOTES
Patient seen in clinic for warfarin management due to atrial fibrillation with an INR goal of 2.0-3.0.  Estimated duration of therapy is indefinite.     Patient states compliant all of the time with regimen.  No bleeding or thromboembolic side effects noted.  No significant med changes.  Patient reports that as of last Thursday, May 9th, he has increased from 5 to 6 cartons of tube feed daily.   No significant recent illness or disease state changes.  Patient notes he will be starting chemotherapy and radiation soon.  He will inform the clinic when this starts.      PT/INR done in office per protocol.  INR is 3.2 which is just above goal.     Warfarin regimen will be decreased to weekly dose of 33mg.  Patient is to take 3mg every Mon, Wed, Fri;  6mg all other days.  Will retest in 8 days.    Patient understands dosing directions and information discussed. Dosing schedule and follow up appointment given to patient.   Progress note routed to referring physicians office. Discussed with patient the Pharmacist Collaborative Practice Agreement.  Patient provided verbal and/or electronic (ex. [employer]) consent to participate in the collaborative practice agreement between the pharmacist and referred patient.     For Pharmacy Admin Tracking Only    For Pharmacy Admin Tracking Only    For Pharmacy Admin Tracking Only    Intervention Detail: Dose Adjustment: 1, reason: Therapy De-escalation  Total # of Interventions Recommended: 1  Total # of Interventions Accepted: 1  Time Spent (min): 15

## 2024-05-28 ENCOUNTER — HOSPITAL ENCOUNTER (OUTPATIENT)
Dept: PHARMACY | Age: 79
Setting detail: THERAPIES SERIES
Discharge: HOME | End: 2024-05-28
Payer: MEDICARE

## 2024-05-28 DIAGNOSIS — I48.91 ATRIAL FIBRILLATION, UNSPECIFIED TYPE (HCC): Primary | ICD-10-CM

## 2024-05-28 LAB
INR BLD: 1.3
PROTIME: 15.2 SECONDS

## 2024-05-28 PROCEDURE — 85610 PROTHROMBIN TIME: CPT | Performed by: PHARMACIST

## 2024-05-28 PROCEDURE — 99211 OFF/OP EST MAY X REQ PHY/QHP: CPT | Performed by: PHARMACIST

## 2024-05-28 NOTE — PROGRESS NOTES
Patient seen in clinic for warfarin management due to atrial fibrillation with an INR goal of 2.0-3.0.  Estimated duration of therapy is indefinite.     Patient states he has been holding warfarin as instructed for procedure on Thurs 5/30 (port placement).  No bleeding or thromboembolic side effects noted.  No significant med or dietary changes; he confirmed still taking 6 cartons of tube feeds per day.  No significant recent illness or disease state changes.      PT/INR done in office per protocol.  INR is 1.3 which is subtherapeutic as expected from held doses.     Warfarin regimen will be resumed after procedure on 5/30 with instructed regimen 3mg Mon/Wed/Fri, 6mg all other days.  Will retest in 9 days to assess (1 week after procedure).    Patient understands dosing directions and information discussed. Dosing schedule and follow up appointment given to patient.   Progress note routed to referring physicians office. Discussed with patient the Pharmacist Collaborative Practice Agreement.  Patient provided verbal and/or electronic (ex. [employer]) consent to participate in the collaborative practice agreement between the pharmacist and referred patient.     For Pharmacy Admin Tracking Only    Intervention Detail:   Total # of Interventions Recommended: 0  Total # of Interventions Accepted: 0  Time Spent (min): 15

## 2024-06-06 ENCOUNTER — HOSPITAL ENCOUNTER (OUTPATIENT)
Dept: PHARMACY | Age: 79
Setting detail: THERAPIES SERIES
Discharge: HOME | End: 2024-06-06
Payer: MEDICARE

## 2024-06-06 DIAGNOSIS — I48.91 ATRIAL FIBRILLATION, UNSPECIFIED TYPE (HCC): Primary | ICD-10-CM

## 2024-06-06 LAB
INR BLD: 2.1
PROTIME: 25.3 SECONDS

## 2024-06-06 PROCEDURE — 99211 OFF/OP EST MAY X REQ PHY/QHP: CPT

## 2024-06-06 PROCEDURE — 85610 PROTHROMBIN TIME: CPT

## 2024-06-06 RX ORDER — OLANZAPINE 5 MG/1
5 TABLET ORAL NIGHTLY
COMMUNITY
Start: 2024-05-15

## 2024-06-06 RX ORDER — ONDANSETRON HYDROCHLORIDE 8 MG/1
TABLET, FILM COATED ORAL
COMMUNITY

## 2024-06-06 RX ORDER — MOMETASONE FUROATE 1 MG/G
CREAM TOPICAL
COMMUNITY
Start: 2024-05-21

## 2024-06-06 RX ORDER — LIDOCAINE AND PRILOCAINE 25; 25 MG/G; MG/G
CREAM TOPICAL
COMMUNITY
Start: 2024-06-04

## 2024-06-17 RX ORDER — DILTIAZEM HYDROCHLORIDE 60 MG/1
TABLET, FILM COATED ORAL
Qty: 120 TABLET | Refills: 0 | Status: SHIPPED | OUTPATIENT
Start: 2024-06-17

## 2024-06-17 RX ORDER — TRAZODONE HYDROCHLORIDE 50 MG/1
50 TABLET ORAL NIGHTLY PRN
Qty: 90 TABLET | Refills: 0 | Status: SHIPPED | OUTPATIENT
Start: 2024-06-17

## 2024-06-17 NOTE — TELEPHONE ENCOUNTER
Haroon Thapa is calling to request a refill on the following medication(s):    Medication Request:  Requested Prescriptions     Pending Prescriptions Disp Refills    traZODone (DESYREL) 50 MG tablet [Pharmacy Med Name: traZODone HCl 50 MG Oral Tablet] 90 tablet 0     Sig: TAKE 1 TABLET BY MOUTH NIGHTLY AS NEEDED FOR SLEEP    dilTIAZem (CARDIZEM) 60 MG tablet [Pharmacy Med Name: dilTIAZem HCl 60 MG Oral Tablet] 120 tablet 0     Sig: ADMINISTER 1 TABLET PER TUBE EVERY 6 HOURS     Last 3/18/24  Last Visit Date (If Applicable):  2/12/2024    Next Visit Date:    8/12/2024

## 2024-06-18 ENCOUNTER — HOSPITAL ENCOUNTER (OUTPATIENT)
Dept: PHARMACY | Age: 79
Setting detail: THERAPIES SERIES
Discharge: HOME | End: 2024-06-18
Payer: MEDICARE

## 2024-06-18 DIAGNOSIS — I48.91 ATRIAL FIBRILLATION, UNSPECIFIED TYPE (HCC): Primary | ICD-10-CM

## 2024-06-18 LAB
INR BLD: 5.8
PROTIME: 69.3 SECONDS

## 2024-06-18 PROCEDURE — 85610 PROTHROMBIN TIME: CPT

## 2024-06-18 PROCEDURE — 99212 OFFICE O/P EST SF 10 MIN: CPT

## 2024-06-18 NOTE — PROGRESS NOTES
Patient seen in clinic for warfarin management due to atrial fibrillation with an INR goal of 2.0-3.0.  Estimated duration of therapy is indefinite.     Patient states compliant all of the time with regimen.  No bleeding or thromboembolic side effects noted.  No significant recent illness. Patient states that he is still using enteral nutrition and has switched from 5 tube feeds per day to 6 tube feeds per day. Patient also states that he recently started chemotherapy and radiation about 3 weeks ago (prior to his last appointment) but does not know the name of chemotherapy he is receiving. Asked the patient to bring an updated medication list with the name of chemotherapy to next appointment for our records. Discussed stress and pain with the patient and counseled how these can increase INR, but patient denies changes in pain or stress. Patient also denies any signs or symptoms of bleeding or bruising.     PT/INR done in office per protocol.  INR is 5.8 which is supratherapeutic.     Warfarin regimen will be held for today and tomorrow, then continued at dose 3mg Monday/Wednesday/Friday and 6mg all other days.  Will retest in 1 week.    Patient understands dosing directions and information discussed. Dosing schedule and follow up appointment given to patient.   Progress note routed to referring physicians office. Discussed with patient the Pharmacist Collaborative Practice Agreement.  Patient provided verbal and/or electronic (ex. PWA) consent to participate in the collaborative practice agreement between the pharmacist and referred patient.     For Pharmacy Admin Tracking Only    Intervention Detail: Dose Adjustment: 1, reason: Therapy De-escalation  Total # of Interventions Recommended: 1  Total # of Interventions Accepted: 1  Time Spent (min): 15

## 2024-06-24 ENCOUNTER — HOSPITAL ENCOUNTER (OUTPATIENT)
Dept: PHARMACY | Age: 79
Setting detail: THERAPIES SERIES
Discharge: HOME | End: 2024-06-24
Payer: MEDICARE

## 2024-06-24 DIAGNOSIS — I48.91 ATRIAL FIBRILLATION, UNSPECIFIED TYPE (HCC): Primary | ICD-10-CM

## 2024-06-24 LAB
INR BLD: 5
PROTIME: 60.3 SECONDS

## 2024-06-24 PROCEDURE — 99212 OFFICE O/P EST SF 10 MIN: CPT

## 2024-06-24 PROCEDURE — 85610 PROTHROMBIN TIME: CPT

## 2024-06-24 RX ORDER — PILOCARPINE HYDROCHLORIDE 5 MG/1
5 TABLET, FILM COATED ORAL 3 TIMES DAILY
COMMUNITY
Start: 2024-06-19

## 2024-06-24 NOTE — PROGRESS NOTES
Patient seen in clinic for warfarin management due to atrial fibrillation with an INR goal of 2.0-3.0.  Estimated duration of therapy is indefinite.     Patient states compliant all of the time with regimen.  No thromboembolic side effects noted.  No significant med or dietary changes.  No significant recent illness. Patient has recently started chemotherapy with taxel and carboplatin. Taxel has the potential to increase the INR. Patient also states that he has his next infusion this Wednesday, 6/26. Patient states that he may have slightly increased stress from everything going on. Patient also states that he has noticed a few small specks of blood when he blows his nose.    PT/INR done in office per protocol.  INR is 5.0 which is supratherapeutic despite decreases in weekly dose made last week, likely due to taxel infusions.     Warfarin regimen will be held for today and tomorrow, then decreased to 6mg Sunday and 3mg all other days.  Will retest in 1 week.    Patient understands dosing directions and information discussed. Dosing schedule and follow up appointment given to patient.   Progress note routed to referring physicians office. Discussed with patient the Pharmacist Collaborative Practice Agreement.  Patient provided verbal and/or electronic (ex. [employer]) consent to participate in the collaborative practice agreement between the pharmacist and referred patient.     For Pharmacy Admin Tracking Only    Intervention Detail: Dose Adjustment: 1, reason: Therapy De-escalation  Total # of Interventions Recommended: 1  Total # of Interventions Accepted: 1  Time Spent (min): 15

## 2024-07-01 ENCOUNTER — HOSPITAL ENCOUNTER (OUTPATIENT)
Dept: PHARMACY | Age: 79
Setting detail: THERAPIES SERIES
Discharge: HOME | End: 2024-07-01
Payer: MEDICARE

## 2024-07-01 LAB
INR BLD: 3.3
PROTIME: 39.4 SECONDS

## 2024-07-01 PROCEDURE — 99212 OFFICE O/P EST SF 10 MIN: CPT

## 2024-07-01 PROCEDURE — 85610 PROTHROMBIN TIME: CPT

## 2024-07-01 NOTE — PROGRESS NOTES
Patient seen in clinic for warfarin management due to atrial fibrillation with an INR goal of 2.0-3.0.  Estimated duration of therapy is indefinite.     Patient states compliant all of the time with regimen.  No bleeding or thromboembolic side effects noted.  No significant med or dietary changes.  No significant recent illness or disease state changes.  Next chemo session is Wednesday July 3rd.    PT/INR done in office per protocol.  INR is 3.3 which is supratherapeutic.     Warfarin regimen will be decreased to 1.5mg every Mon; 3mg all other days.  Will retest in 1 week.    Patient understands dosing directions and information discussed. Dosing schedule and follow up appointment given to patient.   Progress note routed to referring physicians office. Discussed with patient the Pharmacist Collaborative Practice Agreement.  Patient provided verbal and/or electronic (ex. [employer]) consent to participate in the collaborative practice agreement between the pharmacist and referred patient.     For Pharmacy Admin Tracking Only    Intervention Detail: Dose Adjustment: 1, reason: Therapy De-escalation  Total # of Interventions Recommended: 1  Total # of Interventions Accepted: 1  Time Spent (min): 15

## 2024-07-08 ENCOUNTER — HOSPITAL ENCOUNTER (OUTPATIENT)
Dept: PHARMACY | Age: 79
Setting detail: THERAPIES SERIES
Discharge: HOME | End: 2024-07-08
Payer: MEDICARE

## 2024-07-08 LAB
INR BLD: 1.8
PROTIME: 22 SECONDS

## 2024-07-08 PROCEDURE — 85610 PROTHROMBIN TIME: CPT

## 2024-07-08 PROCEDURE — 99211 OFF/OP EST MAY X REQ PHY/QHP: CPT

## 2024-07-08 RX ORDER — SENNOSIDES 8.8 MG/5ML
10 LIQUID ORAL NIGHTLY
COMMUNITY

## 2024-07-08 NOTE — PROGRESS NOTES
Patient seen in clinic for warfarin management due to atrial fibrillation with an INR goal of 2.0-3.0.  Estimated duration of therapy is indefinite.     Patient states  he missed Saturday's dose .  No bleeding or thromboembolic side effects noted.  No significant med or dietary changes.  Patient reports his white cells count is down so the last chemo session will likely be postponed to next Wednesday.  No significant recent illness or disease state changes.      PT/INR done in office per protocol.  INR is 1.8 which is just below goal, likely due to the missed dose.     Warfarin regimen will be continued at current dose 1.5mg every Mon; 3mg all other days.  Will retest in 2 weeks.    Patient understands dosing directions and information discussed. Dosing schedule and follow up appointment given to patient.   Progress note routed to referring physicians office. Discussed with patient the Pharmacist Collaborative Practice Agreement.  Patient provided verbal and/or electronic (ex. [employer]) consent to participate in the collaborative practice agreement between the pharmacist and referred patient.     For Pharmacy Admin Tracking Only    Intervention Detail:   Total # of Interventions Recommended: 0  Total # of Interventions Accepted: 0  Time Spent (min): 15

## 2024-07-11 RX ORDER — ATORVASTATIN CALCIUM 20 MG/1
20 TABLET, FILM COATED ORAL DAILY
Qty: 90 TABLET | Refills: 0 | Status: SHIPPED | OUTPATIENT
Start: 2024-07-11

## 2024-07-11 NOTE — TELEPHONE ENCOUNTER
Haroon Thapa is calling to request a refill on the following medication(s):    Medication Request:  Requested Prescriptions     Pending Prescriptions Disp Refills    atorvastatin (LIPITOR) 20 MG tablet [Pharmacy Med Name: Atorvastatin Calcium 20 MG Oral Tablet] 90 tablet 0     Sig: Take 1 tablet by mouth once daily     Last fill 2/12/24  Last Visit Date (If Applicable):  2/12/2024    Next Visit Date:    8/12/2024

## 2024-07-18 RX ORDER — ATORVASTATIN CALCIUM 20 MG/1
20 TABLET, FILM COATED ORAL DAILY
Qty: 90 TABLET | Refills: 0 | Status: SHIPPED | OUTPATIENT
Start: 2024-07-18

## 2024-07-18 NOTE — TELEPHONE ENCOUNTER
Haroon Thapa is calling to request a refill on the following medication(s):    Medication Request:  Requested Prescriptions     Pending Prescriptions Disp Refills    atorvastatin (LIPITOR) 20 MG tablet [Pharmacy Med Name: Atorvastatin Calcium 20 MG Oral Tablet] 90 tablet 0     Sig: Take 1 tablet by mouth once daily       Last Visit Date (If Applicable):  2/12/2024    Next Visit Date:    8/12/2024

## 2024-07-22 ENCOUNTER — HOSPITAL ENCOUNTER (OUTPATIENT)
Dept: PHARMACY | Age: 79
Setting detail: THERAPIES SERIES
Discharge: HOME | End: 2024-07-22
Payer: MEDICARE

## 2024-07-22 LAB
INR BLD: 2.4
PROTIME: 28.4 SECONDS

## 2024-07-22 PROCEDURE — 85610 PROTHROMBIN TIME: CPT

## 2024-07-22 PROCEDURE — 99211 OFF/OP EST MAY X REQ PHY/QHP: CPT

## 2024-07-22 NOTE — PROGRESS NOTES
Patient seen in clinic for warfarin management due to atrial fibrillation with an INR goal of 2.0-3.0.  Estimated duration of therapy is indefinite.     Patient states compliant all of the time with regimen.  No bleeding or thromboembolic side effects noted.  No significant med or dietary changes.    Patient had last chemo session last Wednesday.  He reports feeling slugging.  No significant recent illness or disease state changes.     PT/INR done in office per protocol.  INR is 2.4 which is therapeutic.     Warfarin regimen will be continued at current dose 1.5mg every Mon, 3mg all other days.  Will retest in 2 weeks.    Patient understands dosing directions and information discussed. Dosing schedule and follow up appointment given to patient.   Progress note routed to referring physicians office. Discussed with patient the Pharmacist Collaborative Practice Agreement.  Patient provided verbal and/or electronic (ex. [employer]) consent to participate in the collaborative practice agreement between the pharmacist and referred patient.     For Pharmacy Admin Tracking Only    Intervention Detail:   Total # of Interventions Recommended: 0  Total # of Interventions Accepted: 0  Time Spent (min): 15

## 2024-07-26 RX ORDER — WARFARIN SODIUM 3 MG/1
TABLET ORAL
Qty: 90 TABLET | Refills: 0 | Status: SHIPPED | OUTPATIENT
Start: 2024-07-26

## 2024-07-26 NOTE — TELEPHONE ENCOUNTER
Haroon Thapa is calling to request a refill on the following medication(s):    Medication Request:  Requested Prescriptions     Pending Prescriptions Disp Refills    warfarin (COUMADIN) 3 MG tablet [Pharmacy Med Name: Warfarin Sodium 3 MG Oral Tablet] 90 tablet 0     Sig: TAKE 1 TABLET BY MOUTH ONCE DAILY AS DIRECTED BY  [facility]       Last Visit Date (If Applicable):  2/12/2024    Next Visit Date:    8/12/2024

## 2024-08-01 RX ORDER — ALLOPURINOL 100 MG/1
100 TABLET ORAL 2 TIMES DAILY
Qty: 180 TABLET | Refills: 0 | Status: SHIPPED | OUTPATIENT
Start: 2024-08-01

## 2024-08-01 NOTE — TELEPHONE ENCOUNTER
Haroon Thapa is calling to request a refill on the following medication(s):    Medication Request:  Requested Prescriptions     Pending Prescriptions Disp Refills    allopurinol (ZYLOPRIM) 100 MG tablet [Pharmacy Med Name: Allopurinol 100 MG Oral Tablet] 180 tablet 0     Sig: Take 1 tablet by mouth twice daily       Last Visit Date (If Applicable):  2/12/2024    Next Visit Date:    8/12/2024

## 2024-08-05 ENCOUNTER — ANTI-COAG VISIT (OUTPATIENT)
Age: 79
End: 2024-08-05
Payer: MEDICARE

## 2024-08-05 LAB — PROTIME: 20.4 SECONDS

## 2024-08-05 PROCEDURE — 85610 PROTHROMBIN TIME: CPT

## 2024-08-05 PROCEDURE — 99211 OFF/OP EST MAY X REQ PHY/QHP: CPT

## 2024-08-05 NOTE — PROGRESS NOTES
Patient seen in clinic for warfarin management due to atrial fibrillation with an INR goal of 2.0-3.0.  Estimated duration of therapy is indefinite.     Patient states compliant all of the time with regimen.  No bleeding or thromboembolic side effects noted.  No significant med or dietary changes.  Patient reports he is now taking 5 cartons of enteral feed (down from 6/day).  Patient states he had some mashed potatoes last week.  Patient notes he is feeling better since last visit.   No significant recent illness or disease state changes.      PT/INR done in office per protocol.  INR is 1.7 which is subtherapeutic.     Warfarin regimen will be continued at current dose of 1.5mg every Mon; 3mg all other days.  Will retest in 8 days.    Patient understands dosing directions and information discussed. Dosing schedule and follow up appointment given to patient.   Progress note routed to referring physicians office. Discussed with patient the Pharmacist Collaborative Practice Agreement.  Patient provided verbal and/or electronic (ex. [employer]) consent to participate in the collaborative practice agreement between the pharmacist and referred patient.     For Pharmacy Admin Tracking Only    Intervention Detail:   Total # of Interventions Recommended: 0  Total # of Interventions Accepted: 0  Time Spent (min): 15

## 2024-08-12 ENCOUNTER — OFFICE VISIT (OUTPATIENT)
Dept: FAMILY MEDICINE CLINIC | Age: 79
End: 2024-08-12

## 2024-08-12 VITALS
WEIGHT: 164.6 LBS | OXYGEN SATURATION: 99 % | HEART RATE: 90 BPM | DIASTOLIC BLOOD PRESSURE: 62 MMHG | HEIGHT: 70 IN | TEMPERATURE: 96.8 F | RESPIRATION RATE: 10 BRPM | SYSTOLIC BLOOD PRESSURE: 114 MMHG | BODY MASS INDEX: 23.56 KG/M2

## 2024-08-12 DIAGNOSIS — Z93.1 PEG (PERCUTANEOUS ENDOSCOPIC GASTROSTOMY) STATUS (HCC): ICD-10-CM

## 2024-08-12 DIAGNOSIS — D68.69 SECONDARY HYPERCOAGULABLE STATE (HCC): ICD-10-CM

## 2024-08-12 DIAGNOSIS — C06.9 ORAL CAVITY CARCINOMA (HCC): ICD-10-CM

## 2024-08-12 DIAGNOSIS — I48.91 ATRIAL FIBRILLATION, UNSPECIFIED TYPE (HCC): Primary | ICD-10-CM

## 2024-08-12 DIAGNOSIS — F51.01 PRIMARY INSOMNIA: ICD-10-CM

## 2024-08-12 DIAGNOSIS — F33.0 MAJOR DEPRESSIVE DISORDER, RECURRENT, MILD (HCC): ICD-10-CM

## 2024-08-12 RX ORDER — TRAZODONE HYDROCHLORIDE 100 MG/1
100 TABLET ORAL NIGHTLY PRN
Qty: 30 TABLET | Refills: 0 | Status: SHIPPED | OUTPATIENT
Start: 2024-08-12 | End: 2024-09-11

## 2024-08-12 RX ORDER — DILTIAZEM HYDROCHLORIDE 60 MG/1
TABLET, FILM COATED ORAL
Qty: 120 TABLET | Refills: 0 | Status: SHIPPED | OUTPATIENT
Start: 2024-08-12

## 2024-08-12 SDOH — ECONOMIC STABILITY: FOOD INSECURITY: WITHIN THE PAST 12 MONTHS, YOU WORRIED THAT YOUR FOOD WOULD RUN OUT BEFORE YOU GOT MONEY TO BUY MORE.: NEVER TRUE

## 2024-08-12 SDOH — ECONOMIC STABILITY: FOOD INSECURITY: WITHIN THE PAST 12 MONTHS, THE FOOD YOU BOUGHT JUST DIDN'T LAST AND YOU DIDN'T HAVE MONEY TO GET MORE.: NEVER TRUE

## 2024-08-12 SDOH — ECONOMIC STABILITY: INCOME INSECURITY: HOW HARD IS IT FOR YOU TO PAY FOR THE VERY BASICS LIKE FOOD, HOUSING, MEDICAL CARE, AND HEATING?: NOT HARD AT ALL

## 2024-08-12 NOTE — PROGRESS NOTES
Haroon Thapa is a 79 y.o. male who presents for   Chief Complaint   Patient presents with    Hypertension     6 month check/ wants to talk about changing his sleep medication    Health Maintenance     PT DUE FOR HEP C, T-DAP, LIPIDS, AWV, FLU    and follow up of chronic medical problems.  Patient Active Problem List   Diagnosis    Colon polyp    Afib (HCC)    Oral cavity carcinoma (HCC)    Adrenal nodule (HCC)    Major depressive disorder, recurrent, mild    Secondary hypercoagulable state (HCC)     HPI  Here for follow-up and wants to discuss about medications for his sleep and check the PEG tube    Current Outpatient Medications   Medication Sig Dispense Refill    dilTIAZem (CARDIZEM) 60 MG tablet GIVE 1 TABLET EVERY 6 HOURS PER  TUBE 120 tablet 0    traZODone (DESYREL) 100 MG tablet Take 1 tablet by mouth nightly as needed for Sleep 30 tablet 0    allopurinol (ZYLOPRIM) 100 MG tablet Take 1 tablet by mouth twice daily 180 tablet 0    warfarin (COUMADIN) 3 MG tablet TAKE 1 TABLET BY MOUTH ONCE DAILY AS DIRECTED BY  [facility] 90 tablet 0    atorvastatin (LIPITOR) 20 MG tablet Take 1 tablet by mouth once daily 90 tablet 0    HYDROcodone-acetaminophen 2.5-108 mg/5 mL solution Take 15 mLs by mouth every 4 hours as needed for Pain.      ondansetron (ZOFRAN) 8 MG tablet TAKE 1 TABLET BY MOUTH TWICE DAILY AS NEEDED SEVERE NAUSEA OR VOMITING      metoprolol tartrate (LOPRESSOR) 50 MG tablet Take 1 tablet by mouth twice daily 180 tablet 1    SIMBRINZA 1-0.2 % SUSP       Cholecalciferol (VITAMIN D3) 125 MCG (5000 UT) TABS Take 1 tablet by mouth daily      albuterol-ipratropium (COMBIVENT RESPIMAT)  MCG/ACT AERS inhaler Inhale 1 puff into the lungs in the morning and 1 puff at noon and 1 puff in the evening and 1 puff before bedtime. 4 g 2    timolol (TIMOPTIC) 0.5 % ophthalmic solution INSTILL 1 DROP INTO EACH EYE IN THE MORNING      famotidine (PEPCID) 20 MG tablet Take 1 tablet by mouth

## 2024-08-12 NOTE — TELEPHONE ENCOUNTER
Haroon Thapa is calling to request a refill on the following medication(s):    Medication Request:  Requested Prescriptions     Pending Prescriptions Disp Refills    dilTIAZem (CARDIZEM) 60 MG tablet [Pharmacy Med Name: dilTIAZem HCl 60 MG Oral Tablet] 120 tablet 0     Sig: GIVE 1 TABLET EVERY 6 HOURS PER  TUBE       Last Visit Date (If Applicable):  2/12/2024    Next Visit Date:    8/12/2024

## 2024-08-13 ENCOUNTER — ANTI-COAG VISIT (OUTPATIENT)
Age: 79
End: 2024-08-13
Payer: MEDICARE

## 2024-08-13 DIAGNOSIS — I48.91 ATRIAL FIBRILLATION, UNSPECIFIED TYPE (HCC): Primary | ICD-10-CM

## 2024-08-13 LAB
INTERNATIONAL NORMALIZATION RATIO, POC: 1.5
PROTHROMBIN TIME, POC: 18.4

## 2024-08-13 PROCEDURE — 99212 OFFICE O/P EST SF 10 MIN: CPT | Performed by: PHARMACIST

## 2024-08-13 PROCEDURE — 85610 PROTHROMBIN TIME: CPT | Performed by: PHARMACIST

## 2024-08-13 NOTE — PROGRESS NOTES
Patient seen in clinic for warfarin management due to atrial fibrillation with an INR goal of 2.0-3.0.  Estimated duration of therapy is indefinite.     Patient states compliant all of the time with regimen.  No bleeding or thromboembolic side effects noted.  No significant med or dietary changes. Patient states that he has been consuming around the same amount of enteral feed as last appointment, maybe a little less. No significant recent illness or disease state changes.      PT/INR done in office per protocol.  INR is 1.5 which is subtherapeutic.     Warfarin regimen will be a 6 mg boost today, then increased to 3 mg daily.  Will retest in two weeks.    Patient understands dosing directions and information discussed. Dosing schedule and follow up appointment given to patient.   Progress note routed to referring physicians office. Discussed with patient the Pharmacist Collaborative Practice Agreement.  Patient provided verbal and/or electronic (ex. [employer]) consent to participate in the collaborative practice agreement between the pharmacist and referred patient.     For Pharmacy Admin Tracking Only    Intervention Detail: Dose Adjustment: 1, reason: Therapy Optimization  Total # of Interventions Recommended: 1  Total # of Interventions Accepted: 1  Time Spent (min): 15

## 2024-08-27 ENCOUNTER — TELEPHONE (OUTPATIENT)
Dept: FAMILY MEDICINE CLINIC | Age: 79
End: 2024-08-27

## 2024-08-27 ENCOUNTER — ANTI-COAG VISIT (OUTPATIENT)
Age: 79
End: 2024-08-27
Payer: MEDICARE

## 2024-08-27 DIAGNOSIS — I48.91 ATRIAL FIBRILLATION, UNSPECIFIED TYPE (HCC): Primary | ICD-10-CM

## 2024-08-27 LAB
INR BLD: 2
PROTIME: 24.1

## 2024-08-27 PROCEDURE — 99211 OFF/OP EST MAY X REQ PHY/QHP: CPT

## 2024-08-27 PROCEDURE — 85610 PROTHROMBIN TIME: CPT

## 2024-08-27 RX ORDER — QUETIAPINE FUMARATE 25 MG/1
25 TABLET, FILM COATED ORAL NIGHTLY
Qty: 30 TABLET | Refills: 0 | Status: SHIPPED | OUTPATIENT
Start: 2024-08-27

## 2024-08-27 NOTE — TELEPHONE ENCOUNTER
Patient states that the trazodone is not helping him sleep.    Says that he is suppose to call and let you know so that you can give him something else?    Please advise

## 2024-08-27 NOTE — PROGRESS NOTES
Patient seen in clinic for warfarin management due to atrial fibrillation with an INR goal of 2.0-3.0.  Estimated duration of therapy is indefinite.     Patient states compliant all of the time with regimen.  No bleeding or thromboembolic side effects noted.  No significant med or dietary changes.  No significant recent illness or disease state changes.      Patient consumes about 4.5-5 bottles of enteral feed daily. Patient has slowly been incorporating more soft foods like mashed potatoes and gravy, cooked dry beans, and scrambled/boiled eggs into his diet.    PT/INR done in office per protocol.  INR is 2.0 which is therapeutic.     Warfarin regimen will be continued at current dose of 3 mg daily.  Will retest in 2 weeks.    Patient understands dosing directions and information discussed. Dosing schedule and follow up appointment given to patient.   Progress note routed to referring physicians office. Discussed with patient the Pharmacist Collaborative Practice Agreement.  Patient provided verbal and/or electronic (ex. [employer]) consent to participate in the collaborative practice agreement between the pharmacist and referred patient.     For Pharmacy Admin Tracking Only    Intervention Detail:   Total # of Interventions Recommended: 0  Total # of Interventions Accepted: 0  Time Spent (min): 15

## 2024-09-11 ENCOUNTER — ANTI-COAG VISIT (OUTPATIENT)
Age: 79
End: 2024-09-11
Payer: MEDICARE

## 2024-09-11 LAB
INTERNATIONAL NORMALIZATION RATIO, POC: 1.7
PROTHROMBIN TIME, POC: 20.1

## 2024-09-11 PROCEDURE — 99212 OFFICE O/P EST SF 10 MIN: CPT

## 2024-09-11 PROCEDURE — 85610 PROTHROMBIN TIME: CPT

## 2024-09-18 ENCOUNTER — TELEPHONE (OUTPATIENT)
Dept: FAMILY MEDICINE CLINIC | Age: 79
End: 2024-09-18

## 2024-09-18 RX ORDER — QUETIAPINE FUMARATE 50 MG/1
50 TABLET, FILM COATED ORAL NIGHTLY
Qty: 90 TABLET | Refills: 0 | Status: SHIPPED | OUTPATIENT
Start: 2024-09-18

## 2024-09-25 ENCOUNTER — APPOINTMENT (OUTPATIENT)
Age: 79
End: 2024-09-25
Payer: MEDICARE

## 2024-09-26 RX ORDER — DILTIAZEM HCL 60 MG
TABLET ORAL
Qty: 120 TABLET | Refills: 0 | Status: SHIPPED | OUTPATIENT
Start: 2024-09-26

## 2024-10-01 RX ORDER — METOPROLOL TARTRATE 50 MG
50 TABLET ORAL 2 TIMES DAILY
Qty: 180 TABLET | Refills: 0 | Status: SHIPPED | OUTPATIENT
Start: 2024-10-01

## 2024-10-01 NOTE — TELEPHONE ENCOUNTER
Haroon Thapa is calling to request a refill on the following medication(s):    Medication Request:  Requested Prescriptions     Pending Prescriptions Disp Refills    metoprolol tartrate (LOPRESSOR) 50 MG tablet [Pharmacy Med Name: Metoprolol Tartrate 50 MG Oral Tablet] 180 tablet 0     Sig: Take 1 tablet by mouth twice daily       Last Visit Date (If Applicable):  8/12/2024    Next Visit Date:    2/17/2025

## 2024-10-02 ENCOUNTER — ANTI-COAG VISIT (OUTPATIENT)
Age: 79
End: 2024-10-02
Payer: MEDICARE

## 2024-10-02 LAB
INTERNATIONAL NORMALIZATION RATIO, POC: 1.7
PROTHROMBIN TIME, POC: 20.8

## 2024-10-02 PROCEDURE — 99212 OFFICE O/P EST SF 10 MIN: CPT

## 2024-10-02 PROCEDURE — 85610 PROTHROMBIN TIME: CPT

## 2024-10-02 NOTE — PROGRESS NOTES
Patient seen in clinic for warfarin management due to atrial fibrillation with an INR goal of 2.0-3.0.  Estimated duration of therapy is indefinite.     Patient states  he missed last Tuesday's dose (3mg) .  No bleeding or thromboembolic side effects noted.  Patient notes trazodone was not helping him sleep; it has been changed to quetiapine, which he states is helping.   Patient is now taking 2 & 1/2 cartons of enteral feed daily and continues to incorporate soft diet.  No significant recent illness or disease state changes.      Patient states he will be having eye surgery (date TBD).  Will reach out for kaci-procedural instructions once date is available.    PT/INR done in office per protocol.  INR is 1.7 which is subtherapeutic, likely due to last week's missed dose.    Warfarin regimen will be 6mg boost today (instead of 4.5mg), then resume current dose of 4.5mg every Wed; 3mg all other days.  Will retest in 2 weeks.    Patient understands dosing directions and information discussed. Dosing schedule and follow up appointment given to patient.   Progress note routed to referring physicians office. Discussed with patient the Pharmacist Collaborative Practice Agreement.  Patient provided verbal and/or electronic (ex. [employer]) consent to participate in the collaborative practice agreement between the pharmacist and referred patient.     For Pharmacy Admin Tracking Only    Intervention Detail: Dose Adjustment: 1, reason: Therapy Optimization  Total # of Interventions Recommended: 1  Total # of Interventions Accepted: 1  Time Spent (min): 15

## 2024-10-16 ENCOUNTER — ANTI-COAG VISIT (OUTPATIENT)
Age: 79
End: 2024-10-16
Payer: MEDICARE

## 2024-10-16 LAB
INTERNATIONAL NORMALIZATION RATIO, POC: 1.9
PROTHROMBIN TIME, POC: 22.5

## 2024-10-16 PROCEDURE — 85610 PROTHROMBIN TIME: CPT

## 2024-10-16 PROCEDURE — 99211 OFF/OP EST MAY X REQ PHY/QHP: CPT

## 2024-10-16 NOTE — PROGRESS NOTES
Patient seen in clinic for warfarin management due to atrial fibrillation with an INR goal of 2.0-3.0.  Estimated duration of therapy is indefinite.     Patient states he held 5 days (10-4 - 10/8) for eye surgery and resumed with boosted dose 6mg x2 days on 10/9 & 10/10.  No bleeding or thromboembolic side effects noted.  No significant med or dietary changes.  Wife states patient has had more enteral feed this past week (3 &1/2 cartons).  No significant recent illness or disease state changes.      PT/INR done in office per protocol.  INR is 1.9 which is just below goal.   This is expected due to recent restart.      Warfarin regimen will be continued at current dose 4.5mg every Wed; 3mg all other days.  Will retest in 2 weeks.    Patient understands dosing directions and information discussed. Dosing schedule and follow up appointment given to patient.   Progress note routed to referring physicians office. Discussed with patient the Pharmacist Collaborative Practice Agreement.  Patient provided verbal and/or electronic (ex. [employer]) consent to participate in the collaborative practice agreement between the pharmacist and referred patient.     For Pharmacy Admin Tracking Only    Intervention Detail:   Total # of Interventions Recommended: 0  Total # of Interventions Accepted: 0  Time Spent (min): 15

## 2024-10-29 RX ORDER — ALLOPURINOL 100 MG/1
100 TABLET ORAL 2 TIMES DAILY
Qty: 180 TABLET | Refills: 0 | Status: SHIPPED | OUTPATIENT
Start: 2024-10-29

## 2024-10-29 NOTE — TELEPHONE ENCOUNTER
Haroon Thapa is calling to request a refill on the following medication(s):    Medication Request:  Requested Prescriptions     Pending Prescriptions Disp Refills    allopurinol (ZYLOPRIM) 100 MG tablet [Pharmacy Med Name: Allopurinol 100 MG Oral Tablet] 180 tablet 0     Sig: Take 1 tablet by mouth twice daily       Last Visit Date (If Applicable):  8/12/2024    Next Visit Date:    2/17/2025

## 2024-10-30 ENCOUNTER — TELEPHONE (OUTPATIENT)
Age: 79
End: 2024-10-30

## 2024-10-30 ENCOUNTER — ANTI-COAG VISIT (OUTPATIENT)
Age: 79
End: 2024-10-30
Payer: MEDICARE

## 2024-10-30 LAB
INTERNATIONAL NORMALIZATION RATIO, POC: 3.3
PROTHROMBIN TIME, POC: 39.6

## 2024-10-30 PROCEDURE — 85610 PROTHROMBIN TIME: CPT

## 2024-10-30 PROCEDURE — 99212 OFFICE O/P EST SF 10 MIN: CPT

## 2024-10-30 RX ORDER — LATANOPROST 50 UG/ML
1 SOLUTION/ DROPS OPHTHALMIC NIGHTLY
COMMUNITY

## 2024-10-30 NOTE — TELEPHONE ENCOUNTER
Patient states he is scheduled to have lung biopsy at [facility] on 11/7/24 and was instructed to hold warfarin 5 days prior without bridging.  Faxed clearance request to Dr. Benito.

## 2024-10-30 NOTE — PROGRESS NOTES
Patient seen in clinic for warfarin management due to atrial fibrillation with an INR goal of 2.0-3.0.  Estimated duration of therapy is indefinite.     Patient states compliant all of the time with regimen.  No bleeding or thromboembolic side effects noted.  No significant med or dietary changes.  No significant recent illness or disease state changes.      Patient states he is scheduled to have lung biopsy at [facility] on 11/7/24 and was instructed to hold warfarin 5 days prior without bridging.  Will fax clearance request to Dr. Benito.    PT/INR done in office per protocol.  INR is 3.3 which is supratherapeutic.     Warfarin regimen will be decreased to 3mg today (instead of 4.5mg), then continued with 3mg daily until Saturday, November 2nd, when patient will start holding  for 5 days for the procedure.   Patient is to resume with boosted dose 6mg x 2 days the evening after the procedure, then back to regular dosing regimen 4.5mg Wed; 3mg daily.   Above dosing regimen provided to patient at today's visit; will update if any changes upon clearance receipt.      Will retest in 15 days (7 days post-op).    Patient understands dosing directions and information discussed. Dosing schedule and follow up appointment given to patient.   Progress note routed to referring physicians office. Discussed with patient the Pharmacist Collaborative Practice Agreement.  Patient provided verbal and/or electronic (ex. [employer]) consent to participate in the collaborative practice agreement between the pharmacist and referred patient.     For Pharmacy Admin Tracking Only    Intervention Detail: Dose Adjustment: 1, reason: Therapy De-escalation  Total # of Interventions Recommended: 1  Total # of Interventions Accepted: 1  Time Spent (min): 15

## 2024-11-04 RX ORDER — WARFARIN SODIUM 3 MG/1
TABLET ORAL
Qty: 90 TABLET | Refills: 0 | Status: SHIPPED | OUTPATIENT
Start: 2024-11-04

## 2024-11-04 RX ORDER — DILTIAZEM HCL 60 MG
TABLET ORAL
Qty: 120 TABLET | Refills: 0 | Status: SHIPPED | OUTPATIENT
Start: 2024-11-04

## 2024-11-04 NOTE — TELEPHONE ENCOUNTER
Haroon Thapa is calling to request a refill on the following medication(s):    Medication Request:  Requested Prescriptions     Pending Prescriptions Disp Refills    dilTIAZem (CARDIZEM) 60 MG tablet [Pharmacy Med Name: dilTIAZem HCl 60 MG Oral Tablet] 120 tablet 0     Sig: TAKE 1 TABLET PER TUBE EVERY 6 HOURS    warfarin (COUMADIN) 3 MG tablet [Pharmacy Med Name: Warfarin Sodium 3 MG Oral Tablet] 90 tablet 0     Sig: TAKE 1 TABLET BY MOUTH ONCE DAILY AS DIRECTED BY  [facility]       Last Visit Date (If Applicable):  8/12/2024    Next Visit Date:    2/17/2025

## 2024-11-14 ENCOUNTER — ANTI-COAG VISIT (OUTPATIENT)
Age: 79
End: 2024-11-14
Payer: MEDICARE

## 2024-11-14 DIAGNOSIS — I48.91 ATRIAL FIBRILLATION, UNSPECIFIED TYPE (HCC): Primary | ICD-10-CM

## 2024-11-14 LAB
INTERNATIONAL NORMALIZATION RATIO, POC: 1.7
PROTHROMBIN TIME, POC: 19.9

## 2024-11-14 PROCEDURE — 99212 OFFICE O/P EST SF 10 MIN: CPT

## 2024-11-14 PROCEDURE — 85610 PROTHROMBIN TIME: CPT

## 2024-11-14 NOTE — PROGRESS NOTES
Patient seen in clinic for warfarin management due to atrial fibrillation with an INR goal of 2.0-3.0.  Estimated duration of therapy is indefinite.     Patient states compliant all of the time with regimen.  No bleeding or thromboembolic side effects noted.  No significant med or dietary changes.  No significant recent illness or disease state changes.      PT/INR done in office per protocol.  INR is 1.7 which is subtherapeutic with 28.5 mg over the last 7 days.     Warfarin regimen will be boosted 6 mg today 11/14 followed by 4.5 mg daily except 3 mg on 11/19 and 11/20 to target 30 mg over the next 7 days.  Will retest in 1 week.    Patient understands dosing directions and information discussed. Dosing schedule and follow up appointment given to patient.   Progress note routed to referring physicians office. Discussed with patient the Pharmacist Collaborative Practice Agreement.  Patient provided verbal and/or electronic (ex. [employer]) consent to participate in the collaborative practice agreement between the pharmacist and referred patient.     For Pharmacy Admin Tracking Only    Intervention Detail: Dose Adjustment: 1, reason: Therapy Optimization  Total # of Interventions Recommended: 1  Total # of Interventions Accepted: 1  Time Spent (min): 15

## 2024-11-20 ENCOUNTER — ANTI-COAG VISIT (OUTPATIENT)
Age: 79
End: 2024-11-20
Payer: MEDICARE

## 2024-11-20 DIAGNOSIS — I48.91 ATRIAL FIBRILLATION, UNSPECIFIED TYPE (HCC): Primary | ICD-10-CM

## 2024-11-20 LAB
INTERNATIONAL NORMALIZATION RATIO, POC: 4
PROTHROMBIN TIME, POC: 48.2

## 2024-11-20 PROCEDURE — 85610 PROTHROMBIN TIME: CPT | Performed by: PHARMACIST

## 2024-11-20 PROCEDURE — 99212 OFFICE O/P EST SF 10 MIN: CPT | Performed by: PHARMACIST

## 2024-11-20 NOTE — PROGRESS NOTES
Patient seen in clinic for warfarin management due to atrial fibrillation with an INR goal of 2.0-3.0.  Estimated duration of therapy is indefinite.     Patient states compliant all of the time with regimen.  No bleeding or thromboembolic side effects noted.  No significant recent illness or disease state changes. He will be starting Keytruda infusion tomorrow and will receive this every 3 weeks. He reports using about 3-4 cartons of tube feeds per day.    PT/INR done in office per protocol.  INR is 4 which is supratherapeutic.     Warfarin regimen will be held today, then 3mg Mon/Wed/Fri, 4.5mg all other days until next appt.  Will retest in 1 week.    Patient understands dosing directions and information discussed. Dosing schedule and follow up appointment given to patient.   Progress note routed to referring physicians office. Discussed with patient the Pharmacist Collaborative Practice Agreement.  Patient provided verbal and/or electronic (ex. [employer]) consent to participate in the collaborative practice agreement between the pharmacist and referred patient.     For Pharmacy Admin Tracking Only    Intervention Detail: Dose Adjustment: 1, reason: Therapy De-escalation  Total # of Interventions Recommended: 1  Total # of Interventions Accepted: 1  Time Spent (min): 15

## 2024-11-27 ENCOUNTER — ANTI-COAG VISIT (OUTPATIENT)
Age: 79
End: 2024-11-27
Payer: MEDICARE

## 2024-11-27 LAB
INTERNATIONAL NORMALIZATION RATIO, POC: 4.2
PROTHROMBIN TIME, POC: 50.6

## 2024-11-27 PROCEDURE — 99212 OFFICE O/P EST SF 10 MIN: CPT

## 2024-11-27 NOTE — PROGRESS NOTES
Patient seen in clinic for warfarin management due to atrial fibrillation with an INR goal of 2.0-3.0.  Estimated duration of therapy is indefinite.     Patient states compliant all of the time with regimen.  No bleeding or thromboembolic side effects noted.  No significant med or dietary changes.  No significant recent illness or disease state changes.      PT/INR done in office per protocol.  INR is 4.2 which is supratherapeutic.     Warfarin regimen will be held today, then decrease weekly target dose to 24mg.  Patient will 4.5mg every Tue, Thu; 3mg all other days .  Will retest in 2 weeks to assess.    Patient understands dosing directions and information discussed. Dosing schedule and follow up appointment given to patient.   Progress note routed to referring physicians office. Discussed with patient the Pharmacist Collaborative Practice Agreement.  Patient provided verbal and/or electronic (ex. [employer]) consent to participate in the collaborative practice agreement between the pharmacist and referred patient.     For Pharmacy Admin Tracking Only    For Pharmacy Admin Tracking Only    Intervention Detail: Dose Adjustment: 1, reason: Therapy De-escalation  Total # of Interventions Recommended: 1  Total # of Interventions Accepted: 1  Time Spent (min): 15

## 2024-12-09 RX ORDER — QUETIAPINE FUMARATE 50 MG/1
50 TABLET, FILM COATED ORAL NIGHTLY
Qty: 90 TABLET | Refills: 0 | Status: SHIPPED | OUTPATIENT
Start: 2024-12-09

## 2024-12-09 NOTE — TELEPHONE ENCOUNTER
Haroon Thapa is calling to request a refill on the following medication(s):    Medication Request:  Requested Prescriptions     Pending Prescriptions Disp Refills    QUEtiapine (SEROQUEL) 50 MG tablet [Pharmacy Med Name: QUEtiapine Fumarate 50 MG Oral Tablet] 90 tablet 0     Sig: TAKE 1 TABLET BY MOUTH AT BEDTIME       Last Visit Date (If Applicable):  8/12/2024    Next Visit Date:    2/17/2025

## 2024-12-11 ENCOUNTER — ANTI-COAG VISIT (OUTPATIENT)
Age: 79
End: 2024-12-11
Payer: MEDICARE

## 2024-12-11 DIAGNOSIS — I48.91 ATRIAL FIBRILLATION, UNSPECIFIED TYPE (HCC): Primary | ICD-10-CM

## 2024-12-11 LAB
INTERNATIONAL NORMALIZATION RATIO, POC: 2.7
PROTHROMBIN TIME, POC: 32.7

## 2024-12-11 PROCEDURE — 85610 PROTHROMBIN TIME: CPT | Performed by: PHARMACIST

## 2024-12-11 PROCEDURE — 99211 OFF/OP EST MAY X REQ PHY/QHP: CPT | Performed by: PHARMACIST

## 2024-12-11 RX ORDER — OFLOXACIN 3 MG/ML
SOLUTION/ DROPS OPHTHALMIC
COMMUNITY
Start: 2024-10-03

## 2024-12-11 NOTE — PROGRESS NOTES
Patient seen in clinic for warfarin management due to atrial fibrillation with an INR goal of 2.0-3.0.  Estimated duration of therapy is indefinite.     Patient states compliant all of the time with regimen.  No bleeding or thromboembolic side effects noted.  No significant med or dietary changes.  No significant recent illness or disease state changes.      PT/INR done in office per protocol.  INR is 2.7 which is therapeutic.     Warfarin regimen will be continued with reduced maintenance target of 24mg weekly. Patient will continue with 4.5mg Tuesday/Friday and 3mg all other days.  Will retest in 3 weeks to ensure INR is stable.    Patient understands dosing directions and information discussed. Dosing schedule and follow up appointment given to patient.   Progress note routed to referring physicians office. Discussed with patient the Pharmacist Collaborative Practice Agreement.  Patient provided verbal and/or electronic (ex. [employer]) consent to participate in the collaborative practice agreement between the pharmacist and referred patient.     For Pharmacy Admin Tracking Only    Intervention Detail: Dose Adjustment: 1, reason: Therapy De-escalation  Total # of Interventions Recommended: 1  Total # of Interventions Accepted: 1  Time Spent (min): 15

## 2024-12-17 RX ORDER — DILTIAZEM HCL 60 MG
TABLET ORAL
Qty: 120 TABLET | Refills: 0 | Status: SHIPPED | OUTPATIENT
Start: 2024-12-17

## 2024-12-17 NOTE — TELEPHONE ENCOUNTER
Haroon Thapa is calling to request a refill on the following medication(s):    Medication Request:  Requested Prescriptions     Pending Prescriptions Disp Refills    dilTIAZem (CARDIZEM) 60 MG tablet [Pharmacy Med Name: dilTIAZem HCl 60 MG Oral Tablet] 120 tablet 0     Sig: ADMINISTER 1 TABLET PER TUBE EVERY 6 HOURS       Last Visit Date (If Applicable):  8/12/2024    Next Visit Date:    2/17/2025

## 2025-01-02 ENCOUNTER — ANTI-COAG VISIT (OUTPATIENT)
Age: 80
End: 2025-01-02
Payer: MEDICARE

## 2025-01-02 DIAGNOSIS — I48.91 ATRIAL FIBRILLATION, UNSPECIFIED TYPE (HCC): Primary | ICD-10-CM

## 2025-01-02 LAB
INTERNATIONAL NORMALIZATION RATIO, POC: 3
PROTHROMBIN TIME, POC: 35.4

## 2025-01-02 PROCEDURE — 99211 OFF/OP EST MAY X REQ PHY/QHP: CPT

## 2025-01-02 PROCEDURE — 85610 PROTHROMBIN TIME: CPT

## 2025-01-02 RX ORDER — METOPROLOL TARTRATE 50 MG
50 TABLET ORAL 2 TIMES DAILY
Qty: 180 TABLET | Refills: 0 | OUTPATIENT
Start: 2025-01-02

## 2025-01-02 NOTE — PROGRESS NOTES
Patient seen in clinic for warfarin management due to atrial fibrillation with an INR goal of 2.0-3.0.  Estimated duration of therapy is indefinite.     Patient states compliant all of the time with regimen.  No bleeding or thromboembolic side effects noted.  No significant med or dietary changes.  No significant recent illness or disease state changes.      PT/INR done in office per protocol.  INR is 3.0 which is therapeutic.     Warfarin regimen will be continued at current dose 4.5mg Tues/Fri and 3mg all other days.  Will retest in 4 weeks.    Patient understands dosing directions and information discussed. Dosing schedule and follow up appointment given to patient.   Progress note routed to referring physicians office. Discussed with patient the Pharmacist Collaborative Practice Agreement.  Patient provided verbal and/or electronic (ex. [employer]) consent to participate in the collaborative practice agreement between the pharmacist and referred patient.     For Pharmacy Admin Tracking Only    Intervention Detail:   Total # of Interventions Recommended: 0  Total # of Interventions Accepted: 0  Time Spent (min): 15

## 2025-01-06 RX ORDER — ATORVASTATIN CALCIUM 20 MG/1
20 TABLET, FILM COATED ORAL DAILY
Qty: 90 TABLET | Refills: 0 | OUTPATIENT
Start: 2025-01-06

## 2025-01-17 ENCOUNTER — TELEPHONE (OUTPATIENT)
Dept: FAMILY MEDICINE CLINIC | Age: 80
End: 2025-01-17

## 2025-01-17 NOTE — TELEPHONE ENCOUNTER
Writer called the patient no answer left message to call office to make himself an appointment at Dr. Leonard Mock office gave patient phone #

## 2025-01-30 ENCOUNTER — ANTI-COAG VISIT (OUTPATIENT)
Age: 80
End: 2025-01-30
Payer: MEDICARE

## 2025-01-30 DIAGNOSIS — I48.91 ATRIAL FIBRILLATION, UNSPECIFIED TYPE (HCC): Primary | ICD-10-CM

## 2025-01-30 LAB
INTERNATIONAL NORMALIZATION RATIO, POC: 2.7
PROTHROMBIN TIME, POC: 32.7

## 2025-01-30 PROCEDURE — 99211 OFF/OP EST MAY X REQ PHY/QHP: CPT

## 2025-01-30 PROCEDURE — 85610 PROTHROMBIN TIME: CPT

## 2025-01-30 NOTE — PROGRESS NOTES
Patient seen in clinic for warfarin management due to atrial fibrillation with an INR goal of 2.0-3.0.  Estimated duration of therapy is indefinite.     Patient states compliant all of the time with regimen.  No bleeding or thromboembolic side effects noted.  No significant med or dietary changes.  No significant recent illness or disease state changes.      PT/INR done in office per protocol.  INR is 2.7 which is therapeutic.     Warfarin regimen will be continued at current dose 4.5 mg Tues/Fri and 3 mg all other days.  Will retest in 4 weeks.    Patient understands dosing directions and information discussed. Dosing schedule and follow up appointment given to patient.   Progress note routed to referring physicians office. Discussed with patient the Pharmacist Collaborative Practice Agreement.  Patient provided verbal and/or electronic (ex. [employer]) consent to participate in the collaborative practice agreement between the pharmacist and referred patient.     For Pharmacy Admin Tracking Only    Intervention Detail:   Total # of Interventions Recommended: 0  Total # of Interventions Accepted: 0  Time Spent (min): 15

## 2025-02-27 ENCOUNTER — ANTI-COAG VISIT (OUTPATIENT)
Age: 80
End: 2025-02-27
Payer: MEDICARE

## 2025-02-27 DIAGNOSIS — I48.91 ATRIAL FIBRILLATION, UNSPECIFIED TYPE (HCC): Primary | ICD-10-CM

## 2025-02-27 LAB
INR BLD: 2.6
PROTIME: 31.7

## 2025-02-27 PROCEDURE — 85610 PROTHROMBIN TIME: CPT

## 2025-02-27 PROCEDURE — 99212 OFFICE O/P EST SF 10 MIN: CPT

## 2025-02-27 RX ORDER — LIDOCAINE AND PRILOCAINE 25; 25 MG/G; MG/G
CREAM TOPICAL
COMMUNITY
Start: 2025-02-18

## 2025-02-27 RX ORDER — WARFARIN SODIUM 3 MG/1
TABLET ORAL
Qty: 135 TABLET | Refills: 0 | Status: SHIPPED | OUTPATIENT
Start: 2025-02-27

## 2025-02-27 RX ORDER — GABAPENTIN 100 MG/1
100 CAPSULE ORAL 3 TIMES DAILY
COMMUNITY
Start: 2025-02-12

## 2025-02-27 RX ORDER — DEXAMETHASONE 4 MG/1
8 TABLET ORAL
COMMUNITY
Start: 2025-02-24

## 2025-02-27 RX ORDER — PREDNISOLONE ACETATE 10 MG/ML
1 SUSPENSION/ DROPS OPHTHALMIC
COMMUNITY
Start: 2025-01-04

## 2025-02-27 NOTE — PROGRESS NOTES
Patient seen in clinic for warfarin management due to atrial fibrillation with an INR goal of 2.0-3.0.  Estimated duration of therapy is indefinite.     Patient states compliant all of the time with regimen.  No bleeding or thromboembolic side effects noted.  No significant med or dietary changes.  He was in the ER twice earlier this month for back pain, but is feeling much better. He reports being prescribed dexamethasone to take the day before and after his chemo infusions, but hasn't started it yet. No other significant recent illness or disease state changes.  He has an appointment with a new PCP on 3/17.    PT/INR done in office per protocol.  INR is 2.6 which is therapeutic.     Warfarin regimen will be continued at current dose 4.5mg on Tues/Fri and 3mg daily the rest of the week.  Will retest in 4 weeks.  Refill sent to [facility] on [address] per patient request.    Patient understands dosing directions and information discussed. Dosing schedule and follow up appointment given to patient.   Progress note routed to referring physicians office. Discussed with patient the Pharmacist Collaborative Practice Agreement.  Patient provided verbal and/or electronic (ex. [employer]) consent to participate in the collaborative practice agreement between the pharmacist and referred patient.     For Pharmacy Admin Tracking Only    Intervention Detail: Refill(s) Provided  Total # of Interventions Recommended: 1  Total # of Interventions Accepted: 1  Time Spent (min): 15

## 2025-03-27 ENCOUNTER — ANTI-COAG VISIT (OUTPATIENT)
Age: 80
End: 2025-03-27
Payer: MEDICARE

## 2025-03-27 DIAGNOSIS — I48.91 ATRIAL FIBRILLATION, UNSPECIFIED TYPE (HCC): Primary | ICD-10-CM

## 2025-03-27 LAB
INTERNATIONAL NORMALIZATION RATIO, POC: 4
PROTHROMBIN TIME, POC: 48.2

## 2025-03-27 PROCEDURE — 85610 PROTHROMBIN TIME: CPT

## 2025-03-27 PROCEDURE — 99212 OFFICE O/P EST SF 10 MIN: CPT

## 2025-03-27 NOTE — PROGRESS NOTES
Patient seen in clinic for warfarin management due to atrial fibrillation with an INR goal of 2.0-3.0.  Estimated duration of therapy is indefinite.     Patient states compliant all of the time with regimen.  No bleeding or thromboembolic side effects noted.  No significant med or dietary changes.  No significant recent illness or disease state changes.  He states he took ciprofloxacin 500 mg BID x5 days and the last dose was on 3/25. Advised in the future to give us a call if he has to take an antibiotic. Otherwise, he is feeling better.    PT/INR done in office per protocol.  INR is 4.0 which is supratherapeutic likely from recent antibiotic use.     Warfarin regimen will be held today 3/27 followed by the maintenance regimen of 4.5 mg Tues/Fri and 3 mg all other days. Encouraged an extra serving of leafy greens if he wants to help bring it down naturally.  Will retest in 2 weeks.    Patient understands dosing directions and information discussed. Dosing schedule and follow up appointment given to patient.   Progress note routed to referring physicians office. Discussed with patient the Pharmacist Collaborative Practice Agreement.  Patient provided verbal and/or electronic (ex. [employer]) consent to participate in the collaborative practice agreement between the pharmacist and referred patient.     For Pharmacy Admin Tracking Only    Intervention Detail: Dose Adjustment: 1, reason: Therapy De-escalation  Total # of Interventions Recommended: 1  Total # of Interventions Accepted: 1  Time Spent (min): 15

## 2025-04-10 ENCOUNTER — ANTI-COAG VISIT (OUTPATIENT)
Age: 80
End: 2025-04-10
Payer: MEDICARE

## 2025-04-10 DIAGNOSIS — I48.91 ATRIAL FIBRILLATION, UNSPECIFIED TYPE (HCC): Primary | ICD-10-CM

## 2025-04-10 LAB
INTERNATIONAL NORMALIZATION RATIO, POC: 3
PROTHROMBIN TIME, POC: 35.9

## 2025-04-10 PROCEDURE — 99211 OFF/OP EST MAY X REQ PHY/QHP: CPT

## 2025-04-10 PROCEDURE — 85610 PROTHROMBIN TIME: CPT

## 2025-04-10 NOTE — PROGRESS NOTES
Patient seen in clinic for warfarin management due to atrial fibrillation with an INR goal of 2.0-3.0.  Estimated duration of therapy is indefinite.     Patient states compliant all of the time with regimen.  No bleeding or thromboembolic side effects noted.  No significant med or dietary changes.  No significant recent illness or disease state changes.      PT/INR done in office per protocol.  INR is 3.0 which is therapeutic.     Warfarin regimen will be continued at current dose 4.5 mg Tues/Fri and 3 mg all other days.  Will retest in 4 weeks.    Patient understands dosing directions and information discussed. Dosing schedule and follow up appointment given to patient.   Progress note routed to referring physicians office. Discussed with patient the Pharmacist Collaborative Practice Agreement.  Patient provided verbal and/or electronic (ex. [email or web address]) consent to participate in the collaborative practice agreement between the pharmacist and referred patient.     For Pharmacy Admin Tracking Only    Intervention Detail:   Total # of Interventions Recommended: 0  Total # of Interventions Accepted: 0  Time Spent (min): 15

## 2025-05-08 ENCOUNTER — ANTI-COAG VISIT (OUTPATIENT)
Age: 80
End: 2025-05-08
Payer: MEDICARE

## 2025-05-08 DIAGNOSIS — I48.91 ATRIAL FIBRILLATION, UNSPECIFIED TYPE (HCC): Primary | ICD-10-CM

## 2025-05-08 LAB
INTERNATIONAL NORMALIZATION RATIO, POC: 5.6
PROTHROMBIN TIME, POC: 67.4

## 2025-05-08 PROCEDURE — 99212 OFFICE O/P EST SF 10 MIN: CPT

## 2025-05-08 PROCEDURE — 85610 PROTHROMBIN TIME: CPT

## 2025-05-08 NOTE — PROGRESS NOTES
Patient seen in clinic for warfarin management due to atrial fibrillation with an INR goal of 2.0-3.0.  Estimated duration of therapy is indefinite.     Patient states compliant all of the time with regimen.  No bleeding or thromboembolic side effects noted.  No significant med or dietary changes.  No significant recent illness or disease state changes.      He did visit the ED on 5/1 with issues in his feeding tube but is better now.    PT/INR done in office per protocol.  INR is 5.6 which is supratherapeutic with no discernible cause.     Warfarin regimen will be held today 5/8 and tomorrow 5/9, followed by the maintenance regimen of 4.5 mg Tues/Fri and 3 mg all other days.  Will retest in 1 week.    Patient understands dosing directions and information discussed. Dosing schedule and follow up appointment given to patient.   Progress note routed to referring physicians office. Discussed with patient the Pharmacist Collaborative Practice Agreement.  Patient provided verbal and/or electronic (ex. [employer]) consent to participate in the collaborative practice agreement between the pharmacist and referred patient.     For Pharmacy Admin Tracking Only    Intervention Detail: Dose Adjustment: 1, reason: Therapy De-escalation  Total # of Interventions Recommended: 1  Total # of Interventions Accepted: 1  Time Spent (min): 15

## 2025-05-14 ENCOUNTER — ANTI-COAG VISIT (OUTPATIENT)
Age: 80
End: 2025-05-14
Payer: MEDICARE

## 2025-05-14 DIAGNOSIS — I48.91 ATRIAL FIBRILLATION, UNSPECIFIED TYPE (HCC): Primary | ICD-10-CM

## 2025-05-14 LAB
INTERNATIONAL NORMALIZATION RATIO, POC: 2.3
PROTHROMBIN TIME, POC: 27.5

## 2025-05-14 PROCEDURE — 85610 PROTHROMBIN TIME: CPT | Performed by: PHARMACIST

## 2025-05-14 PROCEDURE — 99211 OFF/OP EST MAY X REQ PHY/QHP: CPT | Performed by: PHARMACIST

## 2025-05-14 NOTE — PROGRESS NOTES
Patient seen in clinic for warfarin management due to atrial fibrillation with an INR goal of 2.0-3.0.  Estimated duration of therapy is indefinite.     Patient states compliant all of the time with regimen as instructed last week after elevated INR 5.6.  No bleeding or thromboembolic side effects noted.  No significant med or dietary changes.  No significant recent illness or disease state changes.      PT/INR done in office per protocol.  INR is 2.3 which is therapeutic.     Warfarin regimen will be continued cautiously with 4.5mg Tuesday, 3mg all other days until next appt.  Will retest in 1 week.    Patient understands dosing directions and information discussed. Dosing schedule and follow up appointment given to patient.   Progress note routed to referring physicians office. Discussed with patient the Pharmacist Collaborative Practice Agreement.  Patient provided verbal and/or electronic (ex. [employer]) consent to participate in the collaborative practice agreement between the pharmacist and referred patient.     For Pharmacy Admin Tracking Only    Intervention Detail: Dose Adjustment: 1, reason: Therapy De-escalation  Total # of Interventions Recommended: 1  Total # of Interventions Accepted: 1  Time Spent (min): 15

## 2025-05-23 ENCOUNTER — ANTI-COAG VISIT (OUTPATIENT)
Age: 80
End: 2025-05-23
Payer: MEDICARE

## 2025-05-23 DIAGNOSIS — I48.91 ATRIAL FIBRILLATION, UNSPECIFIED TYPE (HCC): Primary | ICD-10-CM

## 2025-05-23 LAB
INTERNATIONAL NORMALIZATION RATIO, POC: 3.4
PROTHROMBIN TIME, POC: 41.3

## 2025-05-23 PROCEDURE — 85610 PROTHROMBIN TIME: CPT

## 2025-05-23 PROCEDURE — 99212 OFFICE O/P EST SF 10 MIN: CPT

## 2025-05-23 RX ORDER — CEFUROXIME AXETIL 500 MG/1
500 TABLET ORAL 2 TIMES DAILY
COMMUNITY
Start: 2025-05-19 | End: 2025-05-29

## 2025-05-23 NOTE — PROGRESS NOTES
Patient seen in clinic for warfarin management due to atrial fibrillation with an INR goal of 2.0-3.0.  Estimated duration of therapy is indefinite.     Patient states compliant all of the time with regimen.  No bleeding or thromboembolic side effects noted.  No significant dietary changes.  Patient presents today with an infection around his feeding tube, for which he is of Cefuroxime.  He started this on 5/19 for a 10 day course.     PT/INR done in office per protocol.  INR is 3.4 which is supratherapeutic likely due to his antibiotic use.     Warfarin regimen will be held for one day and decreased to 3mg daily while he finishes antibiotic course. Regimen will then be continued at usual dose of 4.5mg Tues/Fri and 3mg all other days.   Will retest in 2 weeks.    Patient understands dosing directions and information discussed. Dosing schedule and follow up appointment given to patient.   Progress note routed to referring physicians office. Discussed with patient the Pharmacist Collaborative Practice Agreement.  Patient provided verbal and/or electronic (ex. [employer]) consent to participate in the collaborative practice agreement between the pharmacist and referred patient.     For Pharmacy Admin Tracking Only    Intervention Detail: Dose Adjustment: 2, reason: Therapy De-escalation  Total # of Interventions Recommended: 1  Total # of Interventions Accepted: 1  Time Spent (min): 15

## 2025-06-04 ENCOUNTER — ANTI-COAG VISIT (OUTPATIENT)
Age: 80
End: 2025-06-04
Payer: MEDICARE

## 2025-06-04 DIAGNOSIS — I48.91 ATRIAL FIBRILLATION, UNSPECIFIED TYPE (HCC): Primary | ICD-10-CM

## 2025-06-04 LAB
INTERNATIONAL NORMALIZATION RATIO, POC: 2.8
PROTHROMBIN TIME, POC: 0

## 2025-06-04 PROCEDURE — 99211 OFF/OP EST MAY X REQ PHY/QHP: CPT

## 2025-06-04 PROCEDURE — 85610 PROTHROMBIN TIME: CPT

## 2025-06-04 NOTE — PROGRESS NOTES
Patient seen in clinic for warfarin management due to atrial fibrillation with an INR goal of 2.0-3.0.  Estimated duration of therapy is indefinite.     Patient states compliant all of the time with regimen.  No bleeding or thromboembolic side effects noted.  No significant med or dietary changes.  No significant recent illness or disease state changes.      PT/INR done in office per protocol.  INR is 2.8 which is therapeutic.     Warfarin regimen will be continued at current dose of 4.5mg Tues/Fri and 3mg all other days.  Will retest in 2 weeks.    Patient understands dosing directions and information discussed. Dosing schedule and follow up appointment given to patient.   Progress note routed to referring physicians office. Discussed with patient the Pharmacist Collaborative Practice Agreement.  Patient provided verbal and/or electronic (ex. [employer]) consent to participate in the collaborative practice agreement between the pharmacist and referred patient.     For Pharmacy Admin Tracking Only    Intervention Detail:   Total # of Interventions Recommended: 0  Total # of Interventions Accepted: 0  Time Spent (min): 15

## 2025-06-19 ENCOUNTER — APPOINTMENT (OUTPATIENT)
Age: 80
End: 2025-06-19
Payer: MEDICARE

## 2025-06-20 ENCOUNTER — ANTI-COAG VISIT (OUTPATIENT)
Age: 80
End: 2025-06-20
Payer: MEDICARE

## 2025-06-20 DIAGNOSIS — I48.91 ATRIAL FIBRILLATION, UNSPECIFIED TYPE (HCC): Primary | ICD-10-CM

## 2025-06-20 LAB
INTERNATIONAL NORMALIZATION RATIO, POC: 6.9
PROTHROMBIN TIME, POC: 0

## 2025-06-20 PROCEDURE — 85610 PROTHROMBIN TIME: CPT

## 2025-06-20 PROCEDURE — 99212 OFFICE O/P EST SF 10 MIN: CPT

## 2025-06-20 RX ORDER — WARFARIN SODIUM 3 MG/1
TABLET ORAL
Qty: 135 TABLET | Refills: 0 | Status: SHIPPED | OUTPATIENT
Start: 2025-06-20

## 2025-06-20 NOTE — PROGRESS NOTES
Patient seen in clinic for warfarin management due to atrial fibrillation with an INR goal of 2.0-3.0.  Estimated duration of therapy is indefinite.     Patient states compliant all of the time with regimen.  No bleeding or thromboembolic side effects noted.  No significant dietary changes.  No significant recent illness or disease state changes.  Patient has chemo this week and took dexamethasone on Tuesday and Thursday.     PT/INR done in office per protocol.  INR is 6.9 which is supratherapeutic likely due to the steroid use. I instructed him to skip a dose of warfarin when he is going to take his premedication dexamethasone for future chemo cycles (every 28 days)    Warfarin regimen will be held for 2 days then continued at 4.5mg every Tues/Fri and 3mg all other days.  Will retest in 1 week. Refill sent to [facility] on [address] at patient request.     Patient understands dosing directions and information discussed. Dosing schedule and follow up appointment given to patient.   Progress note routed to referring physicians office. Discussed with patient the Pharmacist Collaborative Practice Agreement.  Patient provided verbal and/or electronic (ex. [employer]) consent to participate in the collaborative practice agreement between the pharmacist and referred patient.     For Pharmacy Admin Tracking Only    Intervention Detail: Dose Adjustment: 1, reason: Therapy De-escalation and Refill(s) Provided  Total # of Interventions Recommended: 2  Total # of Interventions Accepted: 2  Time Spent (min): 15

## 2025-06-27 ENCOUNTER — ANTI-COAG VISIT (OUTPATIENT)
Age: 80
End: 2025-06-27
Payer: MEDICARE

## 2025-06-27 DIAGNOSIS — I48.91 ATRIAL FIBRILLATION, UNSPECIFIED TYPE (HCC): Primary | ICD-10-CM

## 2025-06-27 LAB
INTERNATIONAL NORMALIZATION RATIO, POC: 2
PROTHROMBIN TIME, POC: 0

## 2025-06-27 PROCEDURE — 99211 OFF/OP EST MAY X REQ PHY/QHP: CPT | Performed by: PHARMACIST

## 2025-06-27 PROCEDURE — 85610 PROTHROMBIN TIME: CPT | Performed by: PHARMACIST

## 2025-06-27 NOTE — PROGRESS NOTES
Patient seen in clinic for warfarin management due to atrial fibrillation with an INR goal of 2.0-3.0.  Estimated duration of therapy is indefinite.     Patient states compliant all of the time with regimen as instructed last week.  No bleeding or thromboembolic side effects noted.  No significant med or dietary changes.  No significant recent illness or disease state changes.      PT/INR done in office per protocol.  INR is 2 which is therapeutic.     Warfarin regimen will be continued at current dose 4.5mg Tues/Fri, 3mg all other days.  Will retest in 2 weeks.    Patient understands dosing directions and information discussed. Dosing schedule and follow up appointment given to patient.   Progress note routed to referring physicians office. Discussed with patient the Pharmacist Collaborative Practice Agreement.  Patient provided verbal and/or electronic (ex. [employer]) consent to participate in the collaborative practice agreement between the pharmacist and referred patient.     For Pharmacy Admin Tracking Only    Intervention Detail:   Total # of Interventions Recommended: 0  Total # of Interventions Accepted: 0  Time Spent (min): 15

## 2025-07-11 ENCOUNTER — ANTI-COAG VISIT (OUTPATIENT)
Age: 80
End: 2025-07-11
Payer: MEDICARE

## 2025-07-11 LAB
INTERNATIONAL NORMALIZATION RATIO, POC: 5.3
PROTHROMBIN TIME, POC: 0

## 2025-07-11 PROCEDURE — 99212 OFFICE O/P EST SF 10 MIN: CPT | Performed by: PHARMACIST

## 2025-07-11 PROCEDURE — 85610 PROTHROMBIN TIME: CPT | Performed by: PHARMACIST

## 2025-07-11 NOTE — PROGRESS NOTES
Patient seen in clinic for warfarin management due to atrial fibrillation with an INR goal of 2.0-3.0.  Estimated duration of therapy is indefinite.     Patient states compliant all of the time with regimen.  No bleeding or thromboembolic side effects noted.  No significant dietary changes.  No significant recent illness or disease state changes. He reports taking dexamethasone on Tues 7/8 to pre-medicate for his infusion on 7/9 and did not hold his warfarin the day he took dexamethasone.     He will also be starting a new antibiotic but does not know the name of it - he or his wife will call with the information once they  the medication (per [facility] it looks it would be doxycycline for rash prophylaxis as he will be getting switched from Taxotere to Erbitux infusion)    PT/INR done in office per protocol.  INR is 5.3 which is supratherapeutic from recent steroid. Pt advised that moving forward he can hold his warfarin dose for 1 day when he has to take a steroid.    Warfarin regimen will be held today then resume usual regimen 4.5mg Tues/Fri, 3mg all other days.     Will retest in 1 week. He will call us with the name of his new antibiotic so we can advise whether his regimen will need further adjustment prior to his next appt.    Patient understands dosing directions and information discussed. Dosing schedule and follow up appointment given to patient.   Progress note routed to referring physicians office. Discussed with patient the Pharmacist Collaborative Practice Agreement.  Patient provided verbal and/or electronic (ex. [employer]) consent to participate in the collaborative practice agreement between the pharmacist and referred patient.     For Pharmacy Admin Tracking Only    Intervention Detail: Dose Adjustment: 1, reason: Therapy De-escalation  Total # of Interventions Recommended: 1  Total # of Interventions Accepted: 1  Time Spent (min): 15

## 2025-07-15 ENCOUNTER — TELEPHONE (OUTPATIENT)
Age: 80
End: 2025-07-15

## 2025-07-15 NOTE — TELEPHONE ENCOUNTER
Spouse called clinic to confirm that pt is starting a long-term course of Doxycycline 100mg BID. Given recent elevated INR, I advised he take warfarin 3mg today instead of usual 4.5mg dose, then resume usual regimen (4.5mg Tues/Fri, 3mg all other days) until his INR appt on Fri 7/18.

## 2025-07-18 ENCOUNTER — APPOINTMENT (OUTPATIENT)
Age: 80
End: 2025-07-18
Payer: MEDICARE

## 2025-07-21 ENCOUNTER — ANTI-COAG VISIT (OUTPATIENT)
Age: 80
End: 2025-07-21
Payer: MEDICARE

## 2025-07-21 DIAGNOSIS — I48.91 ATRIAL FIBRILLATION, UNSPECIFIED TYPE (HCC): Primary | ICD-10-CM

## 2025-07-21 LAB
INTERNATIONAL NORMALIZATION RATIO, POC: 2
PROTHROMBIN TIME, POC: 0

## 2025-07-21 PROCEDURE — 85610 PROTHROMBIN TIME: CPT

## 2025-07-21 PROCEDURE — 99211 OFF/OP EST MAY X REQ PHY/QHP: CPT

## 2025-07-21 RX ORDER — DOXYCYCLINE 100 MG/1
100 CAPSULE ORAL 2 TIMES DAILY
COMMUNITY
Start: 2025-07-09 | End: 2026-07-04

## 2025-07-21 RX ORDER — HYDROCODONE BITARTRATE AND ACETAMINOPHEN 5; 325 MG/1; MG/1
1 TABLET ORAL EVERY 6 HOURS PRN
COMMUNITY
Start: 2025-07-10

## 2025-07-21 NOTE — PROGRESS NOTES
Patient seen in clinic for warfarin management due to atrial fibrillation with an INR goal of 2.0-3.0.  Estimated duration of therapy is indefinite.     Patient states he missed one dose on Friday, but took larger dose Saturday to help make up for it. He has otherwise been compliant with regimen.  No bleeding or thromboembolic side effects noted.  No significant dietary changes.  No significant recent illness or disease state changes.  States he started on a long-term course of doxycycline and Norco as needed. He had an ED visit on Friday after chemo due to AMS and weakness, but states he is feeling better now.     PT/INR done in office per protocol.  INR is 2.0 which is therapeutic.     Warfarin regimen will be continued at current dose of 4.5mg every Tues/Fri and 3mg all other days.  Will retest in 1 week to keep close eye on INR while on antibiotics.    Patient understands dosing directions and information discussed. Dosing schedule and follow up appointment given to patient.   Progress note routed to referring physicians office. Discussed with patient the Pharmacist Collaborative Practice Agreement.  Patient provided verbal and/or electronic (ex. [email or web address]) consent to participate in the collaborative practice agreement between the pharmacist and referred patient.     For Pharmacy Admin Tracking Only    Intervention Detail:   Total # of Interventions Recommended: 0  Total # of Interventions Accepted: 0  Time Spent (min): 15

## 2025-07-28 ENCOUNTER — ANTI-COAG VISIT (OUTPATIENT)
Age: 80
End: 2025-07-28
Payer: MEDICARE

## 2025-07-28 DIAGNOSIS — I48.91 ATRIAL FIBRILLATION, UNSPECIFIED TYPE (HCC): Primary | ICD-10-CM

## 2025-07-28 LAB
INTERNATIONAL NORMALIZATION RATIO, POC: 2.9
PROTHROMBIN TIME, POC: 0

## 2025-07-28 PROCEDURE — 99211 OFF/OP EST MAY X REQ PHY/QHP: CPT

## 2025-07-28 PROCEDURE — 85610 PROTHROMBIN TIME: CPT

## 2025-07-28 NOTE — PROGRESS NOTES
Patient seen in clinic for warfarin management due to atrial fibrillation with an INR goal of 2.0-3.0.  Estimated duration of therapy is indefinite.     Patient states compliant all of the time with regimen.  No bleeding or thromboembolic side effects noted.  No significant med or dietary changes.  No significant recent illness or disease state changes.      PT/INR done in office per protocol.  INR is 2.9 which is therapeutic.     Warfarin regimen will be continued at current dose of 4.5mg every Tues/Fri and 3mg all other days.  Will retest in 2 weeks.    Patient understands dosing directions and information discussed. Dosing schedule and follow up appointment given to patient.   Progress note routed to referring physicians office. Discussed with patient the Pharmacist Collaborative Practice Agreement.  Patient provided verbal and/or electronic (ex. [employer]) consent to participate in the collaborative practice agreement between the pharmacist and referred patient.     For Pharmacy Admin Tracking Only    Intervention Detail:   Total # of Interventions Recommended: 0  Total # of Interventions Accepted: 0  Time Spent (min): 15

## 2025-08-12 ENCOUNTER — ANTI-COAG VISIT (OUTPATIENT)
Age: 80
End: 2025-08-12
Payer: MEDICARE

## 2025-08-12 DIAGNOSIS — I48.91 ATRIAL FIBRILLATION, UNSPECIFIED TYPE (HCC): Primary | ICD-10-CM

## 2025-08-12 LAB
INTERNATIONAL NORMALIZATION RATIO, POC: 2.6
PROTHROMBIN TIME, POC: NORMAL

## 2025-08-12 PROCEDURE — 99211 OFF/OP EST MAY X REQ PHY/QHP: CPT | Performed by: PHARMACIST

## 2025-08-12 PROCEDURE — 85610 PROTHROMBIN TIME: CPT | Performed by: PHARMACIST

## (undated) DEVICE — NO USE 18 MONTHS GOWN ISOL XL YEL LF

## (undated) DEVICE — ADAPTER TBNG LUER STUB 15 GA INTMED

## (undated) DEVICE — 60 ML SYRINGE LUER-LOCK TIP: Brand: MONOJECT

## (undated) DEVICE — SOLUTION IV IRRIG 500ML 0.9% SODIUM CHL 2F7123

## (undated) DEVICE — TUBING, SUCTION, 1/4" X 12', STRAIGHT: Brand: MEDLINE

## (undated) DEVICE — DEFENDO AIR WATER SUCTION AND BIOPSY VALVE KIT FOR  OLYMPUS: Brand: DEFENDO AIR/WATER/SUCTION AND BIOPSY VALVE

## (undated) DEVICE — ENDO KIT W/SYRINGE: Brand: MEDLINE INDUSTRIES, INC.

## (undated) DEVICE — GLOVE SURG SZ 75 L12IN FNGR THK87MIL WHT LTX FREE

## (undated) DEVICE — CLIP LIG L235CM RESOL 360 BX/20

## (undated) DEVICE — GLOVE ORANGE PI 7 1/2   MSG9075

## (undated) DEVICE — SNARE ENDOSCP L240CM LOOP W13MM DIA2.4MM SHT THROW SM OVL

## (undated) DEVICE — BASIN EMSIS 700ML GRAPHITE PLAS KID SHP GRAD

## (undated) DEVICE — POLYP TRAP: Brand: TRAPEASE®

## (undated) DEVICE — FORCEPS BX L240CM JAW DIA2.2MM RAD JAW 4 HOT DISP

## (undated) DEVICE — RETRIEVER ENDOSCP L230CM DIA2.5MM NET W3XL5.5CM MIN WRK CHN

## (undated) DEVICE — ERBE NESSY® OMEGA PLATE USA (85+23)CM² , WITH CABLE 3 M: Brand: ERBE

## (undated) DEVICE — CUP MED 1OZ CLR POLYPR FEED GRAD W/O LID